# Patient Record
Sex: MALE | Race: WHITE | Employment: OTHER | URBAN - METROPOLITAN AREA
[De-identification: names, ages, dates, MRNs, and addresses within clinical notes are randomized per-mention and may not be internally consistent; named-entity substitution may affect disease eponyms.]

---

## 2017-07-24 ENCOUNTER — OFFICE VISIT (OUTPATIENT)
Dept: ORTHOPEDIC SURGERY | Facility: CLINIC | Age: 52
End: 2017-07-24

## 2017-07-24 VITALS
BODY MASS INDEX: 35.29 KG/M2 | RESPIRATION RATE: 15 BRPM | WEIGHT: 275 LBS | HEART RATE: 82 BPM | SYSTOLIC BLOOD PRESSURE: 119 MMHG | TEMPERATURE: 97.7 F | DIASTOLIC BLOOD PRESSURE: 81 MMHG | HEIGHT: 74 IN

## 2017-07-24 DIAGNOSIS — M25.512 CHRONIC LEFT SHOULDER PAIN: ICD-10-CM

## 2017-07-24 DIAGNOSIS — M79.672 LEFT FOOT PAIN: ICD-10-CM

## 2017-07-24 DIAGNOSIS — S93.602A FOOT SPRAIN, LEFT, INITIAL ENCOUNTER: Primary | ICD-10-CM

## 2017-07-24 DIAGNOSIS — G89.29 CHRONIC LEFT SHOULDER PAIN: ICD-10-CM

## 2017-07-24 DIAGNOSIS — M25.512 ACUTE PAIN OF LEFT SHOULDER: ICD-10-CM

## 2017-07-24 DIAGNOSIS — S43.52XA ACROMIOCLAVICULAR SPRAIN, LEFT, INITIAL ENCOUNTER: ICD-10-CM

## 2017-07-24 NOTE — PROGRESS NOTES
Patient: Hemal Rodriguez                MRN: [id number]       SSN: [id number]  YOB: 1965        AGE: 50 y.o. SEX: male    PCP: No primary care provider on file.  07/24/17    Chief Complaint   Patient presents with    Shoulder Injury     left     Ankle Injury     left     HISTORY:  Hemal Rodriguez is a 50 y.o. male who is seen for left shoulder and left foot pain. He states that he fell off of an 8 ft. ladder onto a concrete surface on 7/23/17. He was seen at [facility] on 7/24/17. He states severe pain with ROM of his left shoulder. Mr Artem Milan states that the doctor at the [facility] ED was concerned about a crack but x rays were not definitive. He notes foot pain and swelling following the injury. Pain Assessment  7/24/2017   Location of Pain Foot; Ankle   Location Modifiers Left   Severity of Pain 8   Quality of Pain Sharp   Duration of Pain A few hours   Frequency of Pain Several times daily   Aggravating Factors Bending;Stretching;Straightening;Exercise;Standing;Kneeling;Squatting; Walking;Stairs   Limiting Behavior Yes   Relieving Factors Rest   Result of Injury Yes   Work-Related Injury Yes   Type of Injury -     Occupation, etc:  Mr. 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 He lives in [place] with his wife and mother in law. He has two grown step children living with them as well--25and 23years old. Current weight is 275 pounds. He is 6'2\" tall. He is not hypertensive or diabetic. No results found for: HBA1C, HGBE8, LSP1CQME, IRR3FZOX, IRI3FWQV  Weight Metrics 7/24/2017   Weight 275 lb   BMI 35.31 kg/m2       There is no problem list on file for this patient. REVIEW OF SYSTEMS: All Below are Negative except: See HPI   Constitutional: negative for fever, chills, and weight loss.    Cardiovascular: negative for chest pain, claudication, leg swelling, SOB, LAM   Gastrointestinal: Negative for pain, N/V/C/D, Blood in stool or urine, dysuria,  hematuria, incontinence, pelvic pain. Musculoskeletal: See HPI   Neurological: Negative for dizziness and weakness. Negative for headaches, Visual changes, confusion, seizures   Phychiatric/Behavioral: Negative for depression, memory loss, substance  abuse. Extremities: Negative for hair changes, rash, or skin lesion changes. Hematologic: Negative for bleeding problems, bruising, pallor or swollen lymph  nodes   Peripheral Vascular: No calf pain, no circulation deficits. Social History     Social History    Marital status: SINGLE     Spouse name: N/A    Number of children: N/A    Years of education: N/A     Occupational History    Not on file. Social History Main Topics    Smoking status: Never Smoker    Smokeless tobacco: Never Used    Alcohol use Yes      Comment: occasionally    Drug use: No    Sexual activity: Not on file     Other Topics Concern    Not on file     Social History Narrative    No narrative on file      No Known Allergies   No current outpatient prescriptions on file. No current facility-administered medications for this visit.        PHYSICAL EXAMINATION:  Visit Vitals    /81    Pulse 82    Temp 97.7 °F (36.5 °C)    Resp 15    Ht 6' 2\" (1.88 m)    Wt 275 lb (124.7 kg)    BMI 35.31 kg/m2      ORTHO EXAMINATION:  Examination Right shoulder Left shoulder   Skin Intact Intact   Effusion - -   Biceps deformity - -   Atrophy - -   AC joint tenderness - +   Acromial tenderness + +, lateral   Biceps tenderness - -   Forward flexion/Elevation  30   Active abduction  30   External rotation ROM 30 0   Internal rotation ROM 70 70   Apprehension - -   Impingement - -   Drop Arm Test - -   Neurovascular Intact Intact   NO defect at the pectoralis insertion site  Pain with any motion left shoulder  Examination Right Ankle/Foot Left Ankle/Foot   Skin Intact Transverse abrasion of distal third medial tibia    Swelling - - Dorsiflexion 10 5   Plantarflexion 25 15   Deformity - -   Inversion laxity - -   Anterior drawer - -   Medial tenderness - +   Lateral tenderness - +   Heel cord Intact Intact   Sensation Intact Intact   Bunion - -   Toe nails Normal Normal   Capillary refill Normal Normal     RADIOGRAPHS:  XR LEFT SHOULDER 7/24/17  IMPRESSION:  Three views - No fractures, mild acromioclavicular narrowing, no glenohumeral narrowing, no calcific densities. No dislocation. XR LEFT FOOT 7/24/17  IMPRESSION:  Three views - No fractures, no bunion deformity, no heel spur. Soft tissue swelling. IMPRESSION:      ICD-10-CM ICD-9-CM    1. Foot sprain, left, initial encounter S93.602A 845.10 CAST BOOT OR SHOE   2. Chronic left shoulder pain M25.512 719.41 AMB POC XRAY, SHOULDER; COMPLETE, 2+    G89.29 338.29    3. Left foot pain M79.672 729.5 AMB POC XRAY, FOOT; COMPLETE, 3+ VIEW      CAST BOOT OR SHOE   4. Acromioclavicular sprain, left, initial encounter S43. 52XA 840.0 MRI SHOULDER LT W CONT      XR ARTHRO SHOULDER LT   5. Acute pain of left shoulder M25.512 719.41 MRI SHOULDER LT W CONT      XR ARTHRO SHOULDER LT     PLAN:  He will follow up in one week with the results of his left shoulder MRI Arthrogram study. A short fracture walker was applied today. We discussed a possible left foot MRI in the future if pain continues.      Scribed by Feli Wheeler ([address]) as dictated by Warden Duane, MD

## 2017-07-24 NOTE — MR AVS SNAPSHOT
Visit Information Date & Time Provider Department Dept. Phone Encounter #  
 7/24/2017  3:40 PM Ramos Wilkerson, [address] [facility] [place] [phone] [id number] Follow-up Instructions Return in about 1 week (around 7/31/2017). Upcoming Health Maintenance Date Due DTaP/Tdap/Td series (1 - Tdap) 1/8/1988 FOBT Q 1 YEAR AGE 50-75 1/8/2017 INFLUENZA AGE 9 TO ADULT 8/1/2017 Allergies as of 7/24/2017  Review Complete On: 7/24/2017 By: Ramos Wilkerson MD  
 No Known Allergies Current Immunizations  Never Reviewed No immunizations on file. Not reviewed this visit You Were Diagnosed With   
  
 Codes Comments Foot sprain, left, initial encounter    -  Primary ICD-10-CM: J43.004G ICD-9-CM: 845.10 Chronic left shoulder pain     ICD-10-CM: M25.512, G89.29 ICD-9-CM: 719.41, 338.29 Left foot pain     ICD-10-CM: Y64.126 ICD-9-CM: 729.5 Acromioclavicular sprain, left, initial encounter     ICD-10-CM: Z22.07RQ ICD-9-CM: 840.0 Acute pain of left shoulder     ICD-10-CM: M25.512 ICD-9-CM: 719.41 Vitals BP Pulse Temp Resp Height(growth percentile) Weight(growth percentile) 119/81 82 97.7 °F (36.5 °C) 15 6' 2\" (1.88 m) 275 lb (124.7 kg) BMI Smoking Status 35.31 kg/m2 Never Smoker Vitals History BMI and BSA Data Body Mass Index Body Surface Area  
 35.31 kg/m 2 2.55 m 2 Your Updated Medication List  
  
Notice  As of 7/24/2017  5:10 PM  
 You have not been prescribed any medications. We Performed the Following AMB POC XRAY, FOOT; COMPLETE, 3+ VIEW [00403 CPT(R)] AMB POC XRAY, SHOULDER; COMPLETE, 2+ [51970 CPT(R)] CAST BOOT OR SHOE [SUP32 Custom] Comments: LEFT LOW PROFILE BOOT Follow-up Instructions Return in about 1 week (around 7/31/2017). To-Do List   
 07/24/2017 Imaging:  MRI SHOULDER LT W CONT   
  
 07/24/2017 Imaging:  XR ARTHRO SHOULDER LT   
  
  
Patient Instructions MRI of the Shoulder: About This Test 
What is it? MRI (magnetic resonance imaging) is a test that uses a magnetic field and pulses of radio wave energy to make pictures of the organs and structures inside the body. An MRI can give your doctor information about your shoulder, the bones around it, and the tissues around it, such as cartilage, ligaments, and tendons. When you have an MRI, you lie on a table and the table moves into the MRI machine. Why is this test done? An MRI of the shoulder can find problems such as damage to the cartilage, tendons, and ligaments around the shoulder. It can also look for the cause of shoulder pain and find rotator cuff problems. How can you prepare for the test? 
Talk to your doctor about all your health conditions before the test. For example, tell your doctor if: 
· You are allergic to any medicines. · You are or might be pregnant. · You have a pacemaker, an artificial limb, any metal pins or metal parts in your body, metal heart valves, metal clips in your brain, metal implants in your ears, or any other implanted or prosthetic medical device. · You have an intrauterine device (IUD) in place. · You get nervous in confined spaces. You may need medicine to help you relax. · You wear a patch that contains medicine. · You have kidney disease. What happens before the test? 
· You will remove all metal objects, such as hearing aids, dentures, jewelry, watches, and hairpins. · You will take off all or most of your clothes and change into a gown. If you do leave some clothes on, make sure you take everything out of your pockets. · You may have contrast material (dye) put into your arm through a tube called an IV or directly into your shoulder joint. Contrast material helps doctors see specific organs, blood vessels, and most tumors.  
What happens during the test? 
 · You will lie on your back on a table that is part of the MRI scanner. Your head, chest, and arms may be held with straps to help you remain still. · The table will slide into the space that contains the magnet. A device called a coil may be placed over or wrapped around your shoulder. · Inside the scanner you will hear a fan and feel air moving. You may hear tapping, thumping, or snapping noises. You may be given earplugs or headphones to reduce the noise. · You will be asked to hold still during the scan. You may be asked to hold your breath for short periods. · You may be alone in the scanning room, but a technologist will be watching you through a window and talking with you during the test. 
What else should you know about the test? 
· An MRI does not hurt. · If a dye is used, you may feel a quick sting or pinch and some coolness when the IV is started. The dye may give you a metallic taste in your mouth. Some people feel sick to their stomach or get a headache. · If you breastfeed and are concerned about whether the dye used in this test is safe, talk to your doctor. Most experts believe that very little dye passes into breast milk and even less is passed on to the baby. But if you prefer, you can store some of your breast milk ahead of time and use it for a day or two after the test. 
· You may feel warmth in the area being examined. This is normal. 
How long does the test take? · The test usually takes 30 to 60 minutes but can take as long as 2 hours. What happens after the test? 
· You will probably be able to go home right away, depending on the reason for the test. 
· You can go back to your usual activities right away. Follow-up care is a key part of your treatment and safety. Be sure to make and go to all appointments, and call your doctor if you are having problems. It's also a good idea to keep a list of the medicines you take. Ask your doctor when you can expect to have your test results. Where can you learn more? Go to http://sharath-hank.info/. Enter Y462 in the search box to learn more about \"MRI of the Shoulder: About This Test.\" Current as of: October 14, 2016 Content Version: 11.3 © 2001-1248 [employer]. Care instructions adapted under license by [employer] (which disclaims liability or warranty for this information). If you have questions about a medical condition or this instruction, always ask your healthcare professional. [address] any warranty or liability for your use of this information. Foot Sprain: Care Instructions Your Care Instructions A foot sprain occurs when you stretch or tear the ligaments around your foot. Ligaments are the tough tissues that connect one bone to another. A sprain can happen when you run, fall, or hit your toe against something. Sprains often happen when you jump or change direction quickly. This may occur when you play basketball, soccer, or other sports. Most foot sprains will get better with treatment at home. Follow-up care is a key part of your treatment and safety. Be sure to make and go to all appointments, and call your doctor if you are having problems. It's also a good idea to know your test results and keep a list of the medicines you take. How can you care for yourself at home? · Walk or put weight on your sprained foot as long as it does not hurt. · If your doctor gave you a splint or immobilizer, wear it as directed. If you were given crutches, use them as directed. · For the first 2 days after your injury, avoid hot showers, hot tubs, or hot packs. They may increase swelling. · Put ice or a cold pack on your foot for 10 to 20 minutes at a time to stop swelling. Try this every 1 to 2 hours for 3 days (when you are awake) or until the swelling goes down. Put a thin cloth between the ice pack and your skin. Keep your splint dry. · After 2 or 3 days, if your swelling is gone, put a heating pad (set on low) or a warm cloth on your foot. Some doctors suggest that you go back and forth between hot and cold treatments. · Prop up your foot on a pillow when you ice it or anytime you sit or lie down. Try to keep it above the level of your heart. This will help reduce swelling. · Take pain medicines exactly as directed. ¨ If the doctor gave you a prescription medicine for pain, take it as prescribed. ¨ If you are not taking a prescription pain medicine, ask your doctor if you can take an over-the-counter medicine. · Do any exercises that your doctor or physical therapist suggests. · Return to your usual exercise gradually as you feel better. When should you call for help? Call your doctor now or seek immediate medical care if: 
· You have increased or severe pain. · Your toes are cool or pale or change color. · Your wrap or splint feels too tight. · You have signs of a blood clot, such as: 
¨ Pain in your calf, back of the knee, thigh, or groin. ¨ Redness and swelling in your leg or groin. · You have tingling, weakness, or numbness in your leg or foot. Watch closely for changes in your health, and be sure to contact your doctor if: 
· You cannot put any weight on your foot. · You get a fever. · You do not get better as expected. Where can you learn more? Go to http://sharath-hank.info/. Enter H941 in the search box to learn more about \"Foot Sprain: Care Instructions. \" Current as of: March 21, 2017 Content Version: 11.3 © 2036-1835 [employer] Care instructions adapted under license by [employer] (which disclaims liability or warranty for this information). If you have questions about a medical condition or this instruction, always ask your healthcare professional. [address] any warranty or liability for your use of this information. Introducing [facility]! Zeny Del Valle introduces "[employer]" patient portal. Now you can access parts of your medical record, email your doctor's office, and request medication refills online. 1. In your internet browser, go to [email or web address] 2. Click on the First Time User? Click Here link in the Sign In box. You will see the New Member Sign Up page. 3. Enter your "[employer]" Access Code exactly as it appears below. You will not need to use this code after youve completed the sign-up process. If you do not sign up before the expiration date, you must request a new code. · "[employer]" Access Code: [email or web address] Expires: 10/22/2017  3:55 PM 
 
4. Enter the last four digits of your Social Security Number (xxxx) and Date of Birth (mm/dd/yyyy) as indicated and click Submit. You will be taken to the next sign-up page. 5. Create a "[employer]" ID. This will be your "[employer]" login ID and cannot be changed, so think of one that is secure and easy to remember. 6. Create a "[employer]" password. You can change your password at any time. 7. Enter your Password Reset Question and Answer. This can be used at a later time if you forget your password. 8. Enter your e-mail address. You will receive e-mail notification when new information is available in [address] 9. Click Sign Up. You can now view and download portions of your medical record. 10. Click the Download Summary menu link to download a portable copy of your medical information. If you have questions, please visit the Frequently Asked Questions section of the "[employer]" website. Remember, "[employer]" is NOT to be used for urgent needs. For medical emergencies, dial 911. Now available from your iPhone and Android! Please provide this summary of care documentation to your next provider. If you have any questions after today's visit, please call [phone].

## 2017-07-24 NOTE — PATIENT INSTRUCTIONS
MRI of the Shoulder: About This Test  What is it? MRI (magnetic resonance imaging) is a test that uses a magnetic field and pulses of radio wave energy to make pictures of the organs and structures inside the body. An MRI can give your doctor information about your shoulder, the bones around it, and the tissues around it, such as cartilage, ligaments, and tendons. When you have an MRI, you lie on a table and the table moves into the MRI machine. Why is this test done? An MRI of the shoulder can find problems such as damage to the cartilage, tendons, and ligaments around the shoulder. It can also look for the cause of shoulder pain and find rotator cuff problems. How can you prepare for the test?  Talk to your doctor about all your health conditions before the test. For example, tell your doctor if:  · You are allergic to any medicines. · You are or might be pregnant. · You have a pacemaker, an artificial limb, any metal pins or metal parts in your body, metal heart valves, metal clips in your brain, metal implants in your ears, or any other implanted or prosthetic medical device. · You have an intrauterine device (IUD) in place. · You get nervous in confined spaces. You may need medicine to help you relax. · You wear a patch that contains medicine. · You have kidney disease. What happens before the test?  · You will remove all metal objects, such as hearing aids, dentures, jewelry, watches, and hairpins. · You will take off all or most of your clothes and change into a gown. If you do leave some clothes on, make sure you take everything out of your pockets. · You may have contrast material (dye) put into your arm through a tube called an IV or directly into your shoulder joint. Contrast material helps doctors see specific organs, blood vessels, and most tumors. What happens during the test?  · You will lie on your back on a table that is part of the MRI scanner.  Your head, chest, and arms may be held with straps to help you remain still. · The table will slide into the space that contains the magnet. A device called a coil may be placed over or wrapped around your shoulder. · Inside the scanner you will hear a fan and feel air moving. You may hear tapping, thumping, or snapping noises. You may be given earplugs or headphones to reduce the noise. · You will be asked to hold still during the scan. You may be asked to hold your breath for short periods. · You may be alone in the scanning room, but a technologist will be watching you through a window and talking with you during the test.  What else should you know about the test?  · An MRI does not hurt. · If a dye is used, you may feel a quick sting or pinch and some coolness when the IV is started. The dye may give you a metallic taste in your mouth. Some people feel sick to their stomach or get a headache. · If you breastfeed and are concerned about whether the dye used in this test is safe, talk to your doctor. Most experts believe that very little dye passes into breast milk and even less is passed on to the baby. But if you prefer, you can store some of your breast milk ahead of time and use it for a day or two after the test.  · You may feel warmth in the area being examined. This is normal.  How long does the test take? · The test usually takes 30 to 60 minutes but can take as long as 2 hours. What happens after the test?  · You will probably be able to go home right away, depending on the reason for the test.  · You can go back to your usual activities right away. Follow-up care is a key part of your treatment and safety. Be sure to make and go to all appointments, and call your doctor if you are having problems. It's also a good idea to keep a list of the medicines you take. Ask your doctor when you can expect to have your test results. Where can you learn more? Go to http://sharath-hank.info/.   Enter K278 in the search box to learn more about \"MRI of the Shoulder: About This Test.\"  Current as of: October 14, 2016  Content Version: 11.3  © 5513-0056 [employer]. Care instructions adapted under license by [employer] (which disclaims liability or warranty for this information). If you have questions about a medical condition or this instruction, always ask your healthcare professional. [address] any warranty or liability for your use of this information. Foot Sprain: Care Instructions  Your Care Instructions    A foot sprain occurs when you stretch or tear the ligaments around your foot. Ligaments are the tough tissues that connect one bone to another. A sprain can happen when you run, fall, or hit your toe against something. Sprains often happen when you jump or change direction quickly. This may occur when you play basketball, soccer, or other sports. Most foot sprains will get better with treatment at home. Follow-up care is a key part of your treatment and safety. Be sure to make and go to all appointments, and call your doctor if you are having problems. It's also a good idea to know your test results and keep a list of the medicines you take. How can you care for yourself at home? · Walk or put weight on your sprained foot as long as it does not hurt. · If your doctor gave you a splint or immobilizer, wear it as directed. If you were given crutches, use them as directed. · For the first 2 days after your injury, avoid hot showers, hot tubs, or hot packs. They may increase swelling. · Put ice or a cold pack on your foot for 10 to 20 minutes at a time to stop swelling. Try this every 1 to 2 hours for 3 days (when you are awake) or until the swelling goes down. Put a thin cloth between the ice pack and your skin. Keep your splint dry. · After 2 or 3 days, if your swelling is gone, put a heating pad (set on low) or a warm cloth on your foot.  Some doctors suggest that you go back and forth between hot and cold treatments. · Prop up your foot on a pillow when you ice it or anytime you sit or lie down. Try to keep it above the level of your heart. This will help reduce swelling. · Take pain medicines exactly as directed. ¨ If the doctor gave you a prescription medicine for pain, take it as prescribed. ¨ If you are not taking a prescription pain medicine, ask your doctor if you can take an over-the-counter medicine. · Do any exercises that your doctor or physical therapist suggests. · Return to your usual exercise gradually as you feel better. When should you call for help? Call your doctor now or seek immediate medical care if:  · You have increased or severe pain. · Your toes are cool or pale or change color. · Your wrap or splint feels too tight. · You have signs of a blood clot, such as:  ¨ Pain in your calf, back of the knee, thigh, or groin. ¨ Redness and swelling in your leg or groin. · You have tingling, weakness, or numbness in your leg or foot. Watch closely for changes in your health, and be sure to contact your doctor if:  · You cannot put any weight on your foot. · You get a fever. · You do not get better as expected. Where can you learn more? Go to http://sharath-hank.info/. Enter J683 in the search box to learn more about \"Foot Sprain: Care Instructions. \"  Current as of: March 21, 2017  Content Version: 11.3  © 4121-1264 [employer]. Care instructions adapted under license by [employer] (which disclaims liability or warranty for this information). If you have questions about a medical condition or this instruction, always ask your healthcare professional. [address] any warranty or liability for your use of this information.

## 2017-07-27 ENCOUNTER — DOCUMENTATION ONLY (OUTPATIENT)
Dept: ORTHOPEDIC SURGERY | Facility: CLINIC | Age: 52
End: 2017-07-27

## 2017-07-27 ENCOUNTER — TELEPHONE (OUTPATIENT)
Dept: ORTHOPEDIC SURGERY | Age: 52
End: 2017-07-27

## 2017-07-27 DIAGNOSIS — S93.602A FOOT SPRAIN, LEFT, INITIAL ENCOUNTER: ICD-10-CM

## 2017-07-27 DIAGNOSIS — M79.672 LEFT FOOT PAIN: Primary | ICD-10-CM

## 2017-07-27 NOTE — PROGRESS NOTES
MR-ARTHROGRAM OF LEFT SHOULDER IS SCHEDULED AT [facility] ON 8/3/17 AT 1245. PATIENT IS WARE AND FOLLOW UP WITH DR. Josey Berger IS SET FOR 8/4/17.

## 2017-07-27 NOTE — TELEPHONE ENCOUNTER
Katya Wolff (ok per HIPAA) called to report that patient is experiencing increased pain in left calf/knee. The left foot was discussed at last office visit and the possibility of ordering an MRI for the foot, however, patient is now experiencing pain in calf & knee. MRI for shoulder is scheduled for 8/3, should patient be seen again for calf/knee pain or wait until upcoming appointment?   Please advise patient or Lion Orellana at [phone]

## 2017-07-27 NOTE — TELEPHONE ENCOUNTER
Order placed for MRI of left foot. Pt was contacted and informed of order being placed and if there was any increase in swelling, warmth or redness pt should go to the ER.

## 2017-07-28 ENCOUNTER — OFFICE VISIT (OUTPATIENT)
Dept: ORTHOPEDIC SURGERY | Facility: CLINIC | Age: 52
End: 2017-07-28

## 2017-07-28 ENCOUNTER — TELEPHONE (OUTPATIENT)
Dept: ORTHOPEDIC SURGERY | Facility: CLINIC | Age: 52
End: 2017-07-28

## 2017-07-28 VITALS
HEART RATE: 72 BPM | WEIGHT: 275 LBS | OXYGEN SATURATION: 97 % | DIASTOLIC BLOOD PRESSURE: 93 MMHG | RESPIRATION RATE: 18 BRPM | BODY MASS INDEX: 35.29 KG/M2 | SYSTOLIC BLOOD PRESSURE: 140 MMHG | HEIGHT: 74 IN | TEMPERATURE: 96.6 F

## 2017-07-28 DIAGNOSIS — S86.812A STRAIN OF CALF MUSCLE, LEFT, INITIAL ENCOUNTER: ICD-10-CM

## 2017-07-28 DIAGNOSIS — M79.672 LEFT FOOT PAIN: ICD-10-CM

## 2017-07-28 DIAGNOSIS — S93.412A SPRAIN OF CALCANEOFIBULAR LIGAMENT OF LEFT ANKLE, INITIAL ENCOUNTER: ICD-10-CM

## 2017-07-28 DIAGNOSIS — M25.512 ACUTE PAIN OF LEFT SHOULDER: Primary | ICD-10-CM

## 2017-07-28 DIAGNOSIS — S43.52XA ACROMIOCLAVICULAR SPRAIN, LEFT, INITIAL ENCOUNTER: ICD-10-CM

## 2017-07-28 DIAGNOSIS — S93.602A FOOT SPRAIN, LEFT, INITIAL ENCOUNTER: ICD-10-CM

## 2017-07-28 NOTE — PROGRESS NOTES
Patient: Kristen Pitt                MRN: [id number]       SSN: [id number]  YOB: 1965        AGE: 50 y.o. SEX: male    PCP: No primary care provider on file.  07/28/17    Chief Complaint   Patient presents with    Leg Pain     Left     HISTORY:  Kristen Pitt is a 50 y.o. male who is seen for left shoulder and left foot pain. He states that he fell off of an 8 ft. ladder onto a concrete surface on 7/23/17. He was seen at [facility] on 7/24/17. He states severe pain with ROM of his left shoulder. Mr Lm Moe states that the doctor at the [facility] ED was concerned about a crack but x rays were not definitive. He notes foot pain and swelling following the injury. He notes pain over the upper left lateral gastrocnemius area. 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 He lives in [place] with his wife and mother in law. He has two grown step children living with them as well--25and 23years old. Current weight is 275 pounds. He is 6'2\" tall. He is not hypertensive or diabetic. No results found for: HBA1C, HGBE8, NMG8IEMS, PWU4GLFQ, BWU8DHMG  Weight Metrics 7/28/2017 7/24/2017   Weight 275 lb 275 lb   BMI 35.31 kg/m2 35.31 kg/m2       There is no problem list on file for this patient. REVIEW OF SYSTEMS: All Below are Negative except: See HPI   Constitutional: negative for fever, chills, and weight loss.    Cardiovascular: negative for chest pain, claudication, leg swelling, SOB, LAM   Gastrointestinal: Negative for pain, N/V/C/D, Blood in stool or urine, dysuria,  hematuria, incontinence, pelvic pain. Musculoskeletal: See HPI   Neurological: Negative for dizziness and weakness. Negative for headaches, Visual changes, confusion, seizures   Phychiatric/Behavioral: Negative for depression, memory loss, substance  abuse. Extremities: Negative for hair changes, rash, or skin lesion changes. Hematologic: Negative for bleeding problems, bruising, pallor or swollen lymph  nodes   Peripheral Vascular: No calf pain, no circulation deficits. Social History     Social History    Marital status: SINGLE     Spouse name: N/A    Number of children: N/A    Years of education: N/A     Occupational History    Not on file. Social History Main Topics    Smoking status: Never Smoker    Smokeless tobacco: Never Used    Alcohol use Yes      Comment: occasionally    Drug use: No    Sexual activity: Not on file     Other Topics Concern    Not on file     Social History Narrative      No Known Allergies   No current outpatient prescriptions on file. No current facility-administered medications for this visit.        PHYSICAL EXAMINATION:  Visit Vitals    BP (!) 140/93    Pulse 72    Temp 96.6 °F (35.9 °C) (Oral)    Resp 18    Ht 6' 2\" (1.88 m)    Wt 275 lb (124.7 kg)    SpO2 97%    BMI 35.31 kg/m2      ORTHO EXAMINATION:  Examination Right shoulder Left shoulder   Skin Intact Intact   Effusion - -   Biceps deformity - -   Atrophy - -   AC joint tenderness - +   Acromial tenderness + +, lateral   Biceps tenderness - -   Forward flexion/Elevation  30   Active abduction  30   External rotation ROM 30 0   Internal rotation ROM 70 70   Apprehension - -   Impingement - -   Drop Arm Test - -   Neurovascular Intact Intact   No defect over the pectoralis major tendon  Pain with any motion left shoulder  Examination Right knee Left knee   Skin Intact Intact   Range of motion 120-0 120-0   Effusion - -   Medial joint line tenderness + +   Lateral joint line tenderness - -   Popliteal tenderness - -   Osteophytes palpable - -   Alicias - -   Patella crepitus - -   Anterior drawer - -   Lateral laxity - -   Medial laxity - -   Varus deformity - -   Valgus deformity - -   Pretibial edema - 2-3+   Calf tenderness - -   Chuck sign negative    Examination Right Ankle/Foot Left Ankle/Foot   Skin Intact Transverse abrasion of distal third medial tibia    Swelling - -   Dorsiflexion 10 5   Plantarflexion 25 15   Deformity - -   Inversion laxity - -   Anterior drawer - -   Medial tenderness - +   Lateral tenderness - +   Heel cord Intact Intact   Sensation Intact Intact   Bunion - -   Toe nails Normal Normal   Capillary refill Normal Normal   Tenderness over calcaneofibular ligament of left ankle     RADIOGRAPHS:  XR LEFT SHOULDER 7/24/17  IMPRESSION:  Three views - No fractures, mild acromioclavicular narrowing, no glenohumeral narrowing, no calcific densities. No dislocation. XR LEFT FOOT 7/24/17  IMPRESSION:  Three views - No fractures, no bunion deformity, no heel spur. Soft tissue swelling. IMPRESSION:      ICD-10-CM ICD-9-CM    1. Acute pain of left shoulder M25.512 719.41    2. Foot sprain, left, initial encounter P16.600E 845.10    3. Acromioclavicular sprain, left, initial encounter S43. 52XA 840.0    4. Left foot pain M79.672 729.5      PLAN:  He will follow up in one week with the results of his left shoulder MRI Arthrogram study. We discussed a possible left foot/ankle MRI in the future if pain continues. He will continue to use a short fracture walker for his ankle sprain.      Scribed by Barrie Brady ([facility]) as dictated by Antonia Gutierrez MD

## 2017-07-28 NOTE — MR AVS SNAPSHOT
Visit Information Date & Time Provider Department Dept. Phone Encounter #  
 7/28/2017  1:00 PM Amadeo Stark MD [facility] [phone] [id number] Follow-up Instructions Return if symptoms worsen or fail to improve. Your Appointments 8/4/2017  3:40 PM  
DIAG TEST F/U with Amadeo Stark MD  
[address] [facility], [address] [facility] [address] [facility]) Appt Note: f/u lt shoulder MRI  
 [address] [facility], [address] [facility] [address] [phone]  
  
   
 [address] [facility] [address], [address] [facility] [address] Upcoming Health Maintenance Date Due DTaP/Tdap/Td series (1 - Tdap) 1/8/1988 FOBT Q 1 YEAR AGE 50-75 1/8/2017 INFLUENZA AGE 9 TO ADULT 8/1/2017 Allergies as of 7/28/2017  Review Complete On: 7/24/2017 By: Amadeo Stark MD  
 No Known Allergies Current Immunizations  Never Reviewed No immunizations on file. Not reviewed this visit You Were Diagnosed With   
  
 Codes Comments Acute pain of left shoulder    -  Primary ICD-10-CM: M25.512 ICD-9-CM: 719.41 Foot sprain, left, initial encounter     ICD-10-CM: H93.006V ICD-9-CM: 845.10 Acromioclavicular sprain, left, initial encounter     ICD-10-CM: N01.71DW ICD-9-CM: 840.0 Left foot pain     ICD-10-CM: K35.798 ICD-9-CM: 729.5 Strain of calf muscle, left, initial encounter     ICD-10-CM: D79.617A ICD-9-CM: 844.8 Sprain of calcaneofibular ligament of left ankle, initial encounter     ICD-10-CM: J61.629O ICD-9-CM: 845.02 Vitals BP Pulse Temp Resp Height(growth percentile) Weight(growth percentile) (!) 140/93 72 96.6 °F (35.9 °C) (Oral) 18 6' 2\" (1.88 m) 275 lb (124.7 kg) SpO2 BMI Smoking Status 97% 35.31 kg/m2 Never Smoker BMI and BSA Data Body Mass Index Body Surface Area  
 35.31 kg/m 2 2.55 m 2 Your Updated Medication List  
  
 Notice  As of 7/28/2017  1:37 PM  
 You have not been prescribed any medications. Follow-up Instructions Return if symptoms worsen or fail to improve. Patient Instructions Foot Sprain: Care Instructions Your Care Instructions A foot sprain occurs when you stretch or tear the ligaments around your foot. Ligaments are the tough tissues that connect one bone to another. A sprain can happen when you run, fall, or hit your toe against something. Sprains often happen when you jump or change direction quickly. This may occur when you play basketball, soccer, or other sports. Most foot sprains will get better with treatment at home. Follow-up care is a key part of your treatment and safety. Be sure to make and go to all appointments, and call your doctor if you are having problems. It's also a good idea to know your test results and keep a list of the medicines you take. How can you care for yourself at home? · Walk or put weight on your sprained foot as long as it does not hurt. · If your doctor gave you a splint or immobilizer, wear it as directed. If you were given crutches, use them as directed. · For the first 2 days after your injury, avoid hot showers, hot tubs, or hot packs. They may increase swelling. · Put ice or a cold pack on your foot for 10 to 20 minutes at a time to stop swelling. Try this every 1 to 2 hours for 3 days (when you are awake) or until the swelling goes down. Put a thin cloth between the ice pack and your skin. Keep your splint dry. · After 2 or 3 days, if your swelling is gone, put a heating pad (set on low) or a warm cloth on your foot. Some doctors suggest that you go back and forth between hot and cold treatments. · Prop up your foot on a pillow when you ice it or anytime you sit or lie down. Try to keep it above the level of your heart. This will help reduce swelling. · Take pain medicines exactly as directed. ¨ If the doctor gave you a prescription medicine for pain, take it as prescribed. ¨ If you are not taking a prescription pain medicine, ask your doctor if you can take an over-the-counter medicine. · Do any exercises that your doctor or physical therapist suggests. · Return to your usual exercise gradually as you feel better. When should you call for help? Call your doctor now or seek immediate medical care if: 
· You have increased or severe pain. · Your toes are cool or pale or change color. · Your wrap or splint feels too tight. · You have signs of a blood clot, such as: 
¨ Pain in your calf, back of the knee, thigh, or groin. ¨ Redness and swelling in your leg or groin. · You have tingling, weakness, or numbness in your leg or foot. Watch closely for changes in your health, and be sure to contact your doctor if: 
· You cannot put any weight on your foot. · You get a fever. · You do not get better as expected. Where can you learn more? Go to http://sharath-hank.info/. Enter Y002 in the search box to learn more about \"Foot Sprain: Care Instructions. \" Current as of: March 21, 2017 Content Version: 11.3 © 1528-3359 [employer]. Care instructions adapted under license by [employer] (which disclaims liability or warranty for this information). If you have questions about a medical condition or this instruction, always ask your healthcare professional. [place] [address] any warranty or liability for your use of this information. Shoulder Sprain: Care Instructions Your Care Instructions A shoulder sprain occurs when you stretch or tear a ligament in your shoulder. Ligaments are tough tissues that connect one bone to another. A sprain can happen during sports, a fall, or projects around the house. Shoulder sprains usually get better with treatment at home. Follow-up care is a key part of your treatment and safety.  Be sure to make and go to all appointments, and call your doctor if you are having problems. It's also a good idea to know your test results and keep a list of the medicines you take. How can you care for yourself at home? · Rest and protect your shoulder. Try to stop or reduce any action that causes pain. · If your doctor gave you a sling or immobilizer, wear it as directed. A sling or immobilizer supports your shoulder and may make you more comfortable. · Put ice or a cold pack on your shoulder for 10 to 20 minutes at a time. Try to do this every 1 to 2 hours for the next 3 days (when you are awake) or until the swelling goes down. Put a thin cloth between the ice and your skin. Some doctors suggest alternating between hot and cold. · Be safe with medicines. Read and follow all instructions on the label. ¨ If the doctor gave you a prescription medicine for pain, take it as prescribed. ¨ If you are not taking a prescription pain medicine, ask your doctor if you can take an over-the-counter medicine. · For the first day or two after an injury, avoid things that might increase swelling, such as hot showers, hot tubs, or hot packs. · After 2 or 3 days, if your swelling is gone, apply a heating pad set on low or a warm cloth to your shoulder. This helps keep your shoulder flexible. Some doctors suggest that you go back and forth between hot and cold. Put a thin cloth between the heating pad and your skin. · Follow your doctor's or physical therapist's directions for exercises. · Return to your usual level of activity slowly. When should you call for help? Call your doctor now or seek immediate medical care if: 
· Your pain is worse. · You cannot move your shoulder. · Your arm is cool or pale or changes color below the shoulder. · You have tingling, weakness, or numbness in your arm. Watch closely for changes in your health, and be sure to contact your doctor if: 
· You do not get better as expected. Where can you learn more? Go to http://sharath-hank.info/. Enter T696 in the search box to learn more about \"Shoulder Sprain: Care Instructions. \" Current as of: March 21, 2017 Content Version: 11.3 © 6676-6498 [employer]. Care instructions adapted under license by [employer] (which disclaims liability or warranty for this information). If you have questions about a medical condition or this instruction, always ask your healthcare professional. [address] any warranty or liability for your use of this information. MRI of the Shoulder: About This Test 
What is it? MRI (magnetic resonance imaging) is a test that uses a magnetic field and pulses of radio wave energy to make pictures of the organs and structures inside the body. An MRI can give your doctor information about your shoulder, the bones around it, and the tissues around it, such as cartilage, ligaments, and tendons. When you have an MRI, you lie on a table and the table moves into the MRI machine. Why is this test done? An MRI of the shoulder can find problems such as damage to the cartilage, tendons, and ligaments around the shoulder. It can also look for the cause of shoulder pain and find rotator cuff problems. How can you prepare for the test? 
Talk to your doctor about all your health conditions before the test. For example, tell your doctor if: 
· You are allergic to any medicines. · You are or might be pregnant. · You have a pacemaker, an artificial limb, any metal pins or metal parts in your body, metal heart valves, metal clips in your brain, metal implants in your ears, or any other implanted or prosthetic medical device. · You have an intrauterine device (IUD) in place. · You get nervous in confined spaces. You may need medicine to help you relax. · You wear a patch that contains medicine. · You have kidney disease.  
What happens before the test? 
 · You will remove all metal objects, such as hearing aids, dentures, jewelry, watches, and hairpins. · You will take off all or most of your clothes and change into a gown. If you do leave some clothes on, make sure you take everything out of your pockets. · You may have contrast material (dye) put into your arm through a tube called an IV or directly into your shoulder joint. Contrast material helps doctors see specific organs, blood vessels, and most tumors. What happens during the test? 
· You will lie on your back on a table that is part of the MRI scanner. Your head, chest, and arms may be held with straps to help you remain still. · The table will slide into the space that contains the magnet. A device called a coil may be placed over or wrapped around your shoulder. · Inside the scanner you will hear a fan and feel air moving. You may hear tapping, thumping, or snapping noises. You may be given earplugs or headphones to reduce the noise. · You will be asked to hold still during the scan. You may be asked to hold your breath for short periods. · You may be alone in the scanning room, but a technologist will be watching you through a window and talking with you during the test. 
What else should you know about the test? 
· An MRI does not hurt. · If a dye is used, you may feel a quick sting or pinch and some coolness when the IV is started. The dye may give you a metallic taste in your mouth. Some people feel sick to their stomach or get a headache. · If you breastfeed and are concerned about whether the dye used in this test is safe, talk to your doctor. Most experts believe that very little dye passes into breast milk and even less is passed on to the baby. But if you prefer, you can store some of your breast milk ahead of time and use it for a day or two after the test. 
· You may feel warmth in the area being examined. This is normal. 
How long does the test take? · The test usually takes 30 to 60 minutes but can take as long as 2 hours. What happens after the test? 
· You will probably be able to go home right away, depending on the reason for the test. 
· You can go back to your usual activities right away. Follow-up care is a key part of your treatment and safety. Be sure to make and go to all appointments, and call your doctor if you are having problems. It's also a good idea to keep a list of the medicines you take. Ask your doctor when you can expect to have your test results. Where can you learn more? Go to http://sharath-hank.info/. Enter F007 in the search box to learn more about \"MRI of the Shoulder: About This Test.\" Current as of: October 14, 2016 Content Version: 11.3 © 4081-3878 [employer]. Care instructions adapted under license by [employer] (which disclaims liability or warranty for this information). If you have questions about a medical condition or this instruction, always ask your healthcare professional. [address] any warranty or liability for your use of this information. Introducing [facility]! Brianne Lance introduces Ipropertyz patient portal. Now you can access parts of your medical record, email your doctor's office, and request medication refills online. 1. In your internet browser, go to [email or web address] 2. Click on the First Time User? Click Here link in the Sign In box. You will see the New Member Sign Up page. 3. Enter your Ipropertyz Access Code exactly as it appears below. You will not need to use this code after youve completed the sign-up process. If you do not sign up before the expiration date, you must request a new code. · Iproperty[employer] Access Code: [email or web address] Expires: 10/22/2017  3:55 PM 
 
4.  Enter the last four digits of your Social Security Number (xxxx) and Date of Birth (mm/dd/yyyy) as indicated and click Submit. You will be taken to the next sign-up page. 5. Create a CompassMed ID. This will be your [employer] login ID and cannot be changed, so think of one that is secure and easy to remember. 6. Create a [employer]ass[employer] password. You can change your password at any time. 7. Enter your Password Reset Question and Answer. This can be used at a later time if you forget your password. 8. Enter your e-mail address. You will receive e-mail notification when new information is available in [address] 9. Click Sign Up. You can now view and download portions of your medical record. 10. Click the Download Summary menu link to download a portable copy of your medical information. If you have questions, please visit the Frequently Asked Questions section of the Compass[employer] website. Remember, Comp[employer]Med is NOT to be used for urgent needs. For medical emergencies, dial 911. Now available from your iPhone and Android! Please provide this summary of care documentation to your next provider. If you have any questions after today's visit, please call [phone].

## 2017-07-28 NOTE — TELEPHONE ENCOUNTER
[facility] called requesting another fax for an MRI order for the patient. They received the fax from yesterday but they say it is hard to read and they need clarification. Call back # [phone]. Fax # [phone].

## 2017-07-28 NOTE — PATIENT INSTRUCTIONS
Foot Sprain: Care Instructions  Your Care Instructions    A foot sprain occurs when you stretch or tear the ligaments around your foot. Ligaments are the tough tissues that connect one bone to another. A sprain can happen when you run, fall, or hit your toe against something. Sprains often happen when you jump or change direction quickly. This may occur when you play basketball, soccer, or other sports. Most foot sprains will get better with treatment at home. Follow-up care is a key part of your treatment and safety. Be sure to make and go to all appointments, and call your doctor if you are having problems. It's also a good idea to know your test results and keep a list of the medicines you take. How can you care for yourself at home? · Walk or put weight on your sprained foot as long as it does not hurt. · If your doctor gave you a splint or immobilizer, wear it as directed. If you were given crutches, use them as directed. · For the first 2 days after your injury, avoid hot showers, hot tubs, or hot packs. They may increase swelling. · Put ice or a cold pack on your foot for 10 to 20 minutes at a time to stop swelling. Try this every 1 to 2 hours for 3 days (when you are awake) or until the swelling goes down. Put a thin cloth between the ice pack and your skin. Keep your splint dry. · After 2 or 3 days, if your swelling is gone, put a heating pad (set on low) or a warm cloth on your foot. Some doctors suggest that you go back and forth between hot and cold treatments. · Prop up your foot on a pillow when you ice it or anytime you sit or lie down. Try to keep it above the level of your heart. This will help reduce swelling. · Take pain medicines exactly as directed. ¨ If the doctor gave you a prescription medicine for pain, take it as prescribed. ¨ If you are not taking a prescription pain medicine, ask your doctor if you can take an over-the-counter medicine.   · Do any exercises that your doctor or physical therapist suggests. · Return to your usual exercise gradually as you feel better. When should you call for help? Call your doctor now or seek immediate medical care if:  · You have increased or severe pain. · Your toes are cool or pale or change color. · Your wrap or splint feels too tight. · You have signs of a blood clot, such as:  ¨ Pain in your calf, back of the knee, thigh, or groin. ¨ Redness and swelling in your leg or groin. · You have tingling, weakness, or numbness in your leg or foot. Watch closely for changes in your health, and be sure to contact your doctor if:  · You cannot put any weight on your foot. · You get a fever. · You do not get better as expected. Where can you learn more? Go to http://sharath-hank.info/. Enter D453 in the search box to learn more about \"Foot Sprain: Care Instructions. \"  Current as of: March 21, 2017  Content Version: 11.3  © 6764-6781 [employer]. Care instructions adapted under license by [employer] (which disclaims liability or warranty for this information). If you have questions about a medical condition or this instruction, always ask your healthcare professional. [address] any warranty or liability for your use of this information. Shoulder Sprain: Care Instructions  Your Care Instructions    A shoulder sprain occurs when you stretch or tear a ligament in your shoulder. Ligaments are tough tissues that connect one bone to another. A sprain can happen during sports, a fall, or projects around the house. Shoulder sprains usually get better with treatment at home. Follow-up care is a key part of your treatment and safety. Be sure to make and go to all appointments, and call your doctor if you are having problems. It's also a good idea to know your test results and keep a list of the medicines you take. How can you care for yourself at home?   · Rest and protect your shoulder. Try to stop or reduce any action that causes pain. · If your doctor gave you a sling or immobilizer, wear it as directed. A sling or immobilizer supports your shoulder and may make you more comfortable. · Put ice or a cold pack on your shoulder for 10 to 20 minutes at a time. Try to do this every 1 to 2 hours for the next 3 days (when you are awake) or until the swelling goes down. Put a thin cloth between the ice and your skin. Some doctors suggest alternating between hot and cold. · Be safe with medicines. Read and follow all instructions on the label. ¨ If the doctor gave you a prescription medicine for pain, take it as prescribed. ¨ If you are not taking a prescription pain medicine, ask your doctor if you can take an over-the-counter medicine. · For the first day or two after an injury, avoid things that might increase swelling, such as hot showers, hot tubs, or hot packs. · After 2 or 3 days, if your swelling is gone, apply a heating pad set on low or a warm cloth to your shoulder. This helps keep your shoulder flexible. Some doctors suggest that you go back and forth between hot and cold. Put a thin cloth between the heating pad and your skin. · Follow your doctor's or physical therapist's directions for exercises. · Return to your usual level of activity slowly. When should you call for help? Call your doctor now or seek immediate medical care if:  · Your pain is worse. · You cannot move your shoulder. · Your arm is cool or pale or changes color below the shoulder. · You have tingling, weakness, or numbness in your arm. Watch closely for changes in your health, and be sure to contact your doctor if:  · You do not get better as expected. Where can you learn more? Go to http://sharath-hank.info/. Enter G848 in the search box to learn more about \"Shoulder Sprain: Care Instructions. \"  Current as of: March 21, 2017  Content Version: 11.3  © 6176-3866 Healthwise[employer] Incorporated. Care instructions adapted under license by [employer] (which disclaims liability or warranty for this information). If you have questions about a medical condition or this instruction, always ask your healthcare professional. [address] any warranty or liability for your use of this information. MRI of the Shoulder: About This Test  What is it? MRI (magnetic resonance imaging) is a test that uses a magnetic field and pulses of radio wave energy to make pictures of the organs and structures inside the body. An MRI can give your doctor information about your shoulder, the bones around it, and the tissues around it, such as cartilage, ligaments, and tendons. When you have an MRI, you lie on a table and the table moves into the MRI machine. Why is this test done? An MRI of the shoulder can find problems such as damage to the cartilage, tendons, and ligaments around the shoulder. It can also look for the cause of shoulder pain and find rotator cuff problems. How can you prepare for the test?  Talk to your doctor about all your health conditions before the test. For example, tell your doctor if:  · You are allergic to any medicines. · You are or might be pregnant. · You have a pacemaker, an artificial limb, any metal pins or metal parts in your body, metal heart valves, metal clips in your brain, metal implants in your ears, or any other implanted or prosthetic medical device. · You have an intrauterine device (IUD) in place. · You get nervous in confined spaces. You may need medicine to help you relax. · You wear a patch that contains medicine. · You have kidney disease. What happens before the test?  · You will remove all metal objects, such as hearing aids, dentures, jewelry, watches, and hairpins. · You will take off all or most of your clothes and change into a gown.  If you do leave some clothes on, make sure you take everything out of your pockets. · You may have contrast material (dye) put into your arm through a tube called an IV or directly into your shoulder joint. Contrast material helps doctors see specific organs, blood vessels, and most tumors. What happens during the test?  · You will lie on your back on a table that is part of the MRI scanner. Your head, chest, and arms may be held with straps to help you remain still. · The table will slide into the space that contains the magnet. A device called a coil may be placed over or wrapped around your shoulder. · Inside the scanner you will hear a fan and feel air moving. You may hear tapping, thumping, or snapping noises. You may be given earplugs or headphones to reduce the noise. · You will be asked to hold still during the scan. You may be asked to hold your breath for short periods. · You may be alone in the scanning room, but a technologist will be watching you through a window and talking with you during the test.  What else should you know about the test?  · An MRI does not hurt. · If a dye is used, you may feel a quick sting or pinch and some coolness when the IV is started. The dye may give you a metallic taste in your mouth. Some people feel sick to their stomach or get a headache. · If you breastfeed and are concerned about whether the dye used in this test is safe, talk to your doctor. Most experts believe that very little dye passes into breast milk and even less is passed on to the baby. But if you prefer, you can store some of your breast milk ahead of time and use it for a day or two after the test.  · You may feel warmth in the area being examined. This is normal.  How long does the test take? · The test usually takes 30 to 60 minutes but can take as long as 2 hours. What happens after the test?  · You will probably be able to go home right away, depending on the reason for the test.  · You can go back to your usual activities right away.   Follow-up care is a key part of your treatment and safety. Be sure to make and go to all appointments, and call your doctor if you are having problems. It's also a good idea to keep a list of the medicines you take. Ask your doctor when you can expect to have your test results. Where can you learn more? Go to http://sharath-hank.info/. Enter Y298 in the search box to learn more about \"MRI of the Shoulder: About This Test.\"  Current as of: October 14, 2016  Content Version: 11.3  © 4966-4192 Healthwise, Incorporated. Care instructions adapted under license by [employer] (which disclaims liability or warranty for this information). If you have questions about a medical condition or this instruction, always ask your healthcare professional. [facility] [address] any warranty or liability for your use of this information.

## 2017-08-03 DIAGNOSIS — S43.52XA ACROMIOCLAVICULAR SPRAIN, LEFT, INITIAL ENCOUNTER: ICD-10-CM

## 2017-08-03 DIAGNOSIS — M25.512 ACUTE PAIN OF LEFT SHOULDER: ICD-10-CM

## 2017-08-04 ENCOUNTER — OFFICE VISIT (OUTPATIENT)
Dept: ORTHOPEDIC SURGERY | Facility: CLINIC | Age: 52
End: 2017-08-04

## 2017-08-04 VITALS
SYSTOLIC BLOOD PRESSURE: 144 MMHG | OXYGEN SATURATION: 98 % | WEIGHT: 277.6 LBS | HEIGHT: 74 IN | HEART RATE: 79 BPM | BODY MASS INDEX: 35.63 KG/M2 | DIASTOLIC BLOOD PRESSURE: 82 MMHG | TEMPERATURE: 98.5 F | RESPIRATION RATE: 18 BRPM

## 2017-08-04 DIAGNOSIS — M25.512 ACUTE PAIN OF LEFT SHOULDER: ICD-10-CM

## 2017-08-04 DIAGNOSIS — S43.52XD ACROMIOCLAVICULAR SPRAIN, LEFT, SUBSEQUENT ENCOUNTER: ICD-10-CM

## 2017-08-04 DIAGNOSIS — S93.412D SPRAIN OF CALCANEOFIBULAR LIGAMENT OF LEFT ANKLE, SUBSEQUENT ENCOUNTER: ICD-10-CM

## 2017-08-04 DIAGNOSIS — S46.812A INFRASPINATUS TENDON TEAR, LEFT, INITIAL ENCOUNTER: ICD-10-CM

## 2017-08-04 DIAGNOSIS — M75.122 COMPLETE TEAR OF LEFT ROTATOR CUFF: Primary | ICD-10-CM

## 2017-08-04 RX ORDER — HYDROCODONE BITARTRATE AND ACETAMINOPHEN 7.5; 325 MG/1; MG/1
1-2 TABLET ORAL
Qty: 60 TAB | Refills: 0 | Status: SHIPPED | OUTPATIENT
Start: 2017-08-04 | End: 2017-08-23

## 2017-08-04 NOTE — PATIENT INSTRUCTIONS
Rotator Cuff Repair: Before Your Surgery  What is rotator cuff repair surgery? Rotator cuff repair surgery is done to fix a tear in the rotator cuff. Your doctor may also clean the space between the rotator cuff tendons and the shoulder blade. This is called subacromial smoothing. Your doctor will do either arthroscopic or open surgery. With arthroscopic surgery, your doctor uses a lighted tube with a tiny camera. This tube is called an arthroscope. Your doctor puts it and other surgical tools through small cuts (incisions) in your shoulder. Most people go home the same day they have this surgery. With open surgery, your doctor will make a 2- to 4-inch incision in your shoulder. Most people go home the same day they have this surgery. In both surgeries, the scars usually fade with time. You will wear a sling for a few weeks. You will need physical rehabilitation (rehab). At first, you will get help with the exercises. Later, your doctor or physical therapist will give you exercises to do on your own. Rehab will last several months. It will take 3 to 4 months before you will be able to use your arm normally. It will take 4 to 6 months before you will be able to throw objects or play sports. After surgery and rehab, you will probably have less pain and more strength in your shoulder. You should be able to lift and rotate your arm better. Some people have to avoid lifting heavy objects. If you have a desk job, you will probably be able to return to work or your normal routine in 1 to 2 weeks. If you push, pull, or lift at work, you may be away from work for 3 to 4 months or longer. If you work at a job that involves heavy manual labor, lifting your arms above your head, or using heavy tools, you may have to think about finding other work. Follow-up care is a key part of your treatment and safety. Be sure to make and go to all appointments, and call your doctor if you are having problems.  It's also a good idea to know your test results and keep a list of the medicines you take. What happens before surgery? Surgery can be stressful. This information will help you understand what you can expect. And it will help you safely prepare for surgery. Preparing for surgery  · Understand exactly what surgery is planned, along with the risks, benefits, and other options. · Tell your doctors ALL the medicines, vitamins, supplements, and herbal remedies you take. Some of these can increase the risk of bleeding or interact with anesthesia. · If you take blood thinners, such as warfarin (Coumadin), clopidogrel (Plavix), or aspirin, be sure to talk to your doctor. He or she will tell you if you should stop taking these medicines before your surgery. Make sure that you understand exactly what your doctor wants you to do. · Your doctor will tell you which medicines to take or stop before your surgery. You may need to stop taking certain medicines a week or more before surgery. So talk to your doctor as soon as you can. · If you have an advance directive, let your doctor know. It may include a living will and a durable power of  for health care. Bring a copy to the hospital. If you don't have one, you may want to prepare one. It lets your doctor and loved ones know your health care wishes. Doctors advise that everyone prepare these papers before any type of surgery or procedure. What happens on the day of surgery? · Follow the instructions exactly about when to stop eating and drinking. If you don't, your surgery may be canceled. If your doctor told you to take your medicines on the day of surgery, take them with only a sip of water. · Take a bath or shower before you come in for your surgery. Do not apply lotions, perfumes, deodorants, or nail polish. · Do not shave the surgical site yourself. · Take off all jewelry and piercings. And take out contact lenses, if you wear them.   At the hospital or surgery center  · Bring a picture ID. · The area for surgery is often marked to make sure there are no errors. · You will be kept comfortable and safe by your anesthesia provider. The anesthesia may make you sleep. Or it may just numb the area being worked on. · The surgery will take about 2 hours. Going home  · Be sure you have someone to drive you home. Anesthesia and pain medicine make it unsafe for you to drive. · You will be given more specific instructions about recovering from your surgery. They will cover things like diet, wound care, follow-up care, driving, and getting back to your normal routine. When should you call your doctor? · You have questions or concerns. · You don't understand how to prepare for your surgery. · You become ill before the surgery (such as fever, flu, or a cold). · You need to reschedule or have changed your mind about having the surgery. Where can you learn more? Go to http://sharath-hank.info/. Enter R583 in the search box to learn more about \"Rotator Cuff Repair: Before Your Surgery. \"  Current as of: March 21, 2017  Content Version: 11.3  © 0793-9911 [employer]. Care instructions adapted under license by [employer] (which disclaims liability or warranty for this information). If you have questions about a medical condition or this instruction, always ask your healthcare professional. [address] any warranty or liability for your use of this information.

## 2017-08-04 NOTE — LETTER
8/4/2017 5:01 PM 
 
Mr. Steven Marinelli [address] [facility] [phone] THE ABOVE PATIENT WAS SEEN TODAY AND IS TO REMAIN OUT OF WORK UNTIL January 1ST 2018.  
 
 
Sincerely, 
 
 
Lexie Chin MD

## 2017-08-04 NOTE — PROGRESS NOTES
Patient: Dev Ku                MRN: [id number]       SSN: [id number]  YOB: 1965        AGE: 50 y.o. SEX: male    PCP: No primary care provider on file. 08/04/17    Chief Complaint   Patient presents with    Shoulder Pain     Left     HISTORY:  Dev Ku is a 50 y.o. male who is seen for increased left shoulder and left foot pain. He states that he fell off of an 8 ft. ladder onto a concrete surface on 7/23/17. He was seen at [facility] on 7/24/17. He states severe pain with ROM of his left shoulder. He notes increased shoulder pain at night. Mr Bijan Mitchell states that the doctor at the [facility] ED was concerned about a crack but x rays were not definitive. He notes foot pain and swelling following the injury. He notes pain over the upper left lateral gastrocnemius area. He reports increased pain when wearing the short fracture walker over his left foot. 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 He lives in [place] with his wife and mother in law. He has two grown step children living with them as well--25and 23years old. Current weight is 277 pounds. He is 6'2\" tall. He is not hypertensive or diabetic. No results found for: HBA1C, HGBE8, PMK5QGOI, MWF2YLGA, PKO2FCNI  Weight Metrics 8/4/2017 7/28/2017 7/24/2017   Weight 277 lb 9.6 oz 275 lb 275 lb   BMI 35.64 kg/m2 35.31 kg/m2 35.31 kg/m2       There is no problem list on file for this patient.     REVIEW OF SYSTEMS: All Below are Negative except: See HPI   Constitutional: negative for fever, chills, and weight loss. Cardiovascular: negative for chest pain, claudication, leg swelling, SOB, LAM   Gastrointestinal: Negative for pain, N/V/C/D, Blood in stool or urine, dysuria,  hematuria, incontinence, pelvic pain. Musculoskeletal: See HPI   Neurological: Negative for dizziness and weakness. Negative for headaches, Visual changes, confusion, seizures   Phychiatric/Behavioral: Negative for depression, memory loss, substance  abuse. Extremities: Negative for hair changes, rash, or skin lesion changes. Hematologic: Negative for bleeding problems, bruising, pallor or swollen lymph  nodes   Peripheral Vascular: No calf pain, no circulation deficits. Social History     Social History    Marital status: SINGLE     Spouse name: N/A    Number of children: N/A    Years of education: N/A     Occupational History    Not on file. Social History Main Topics    Smoking status: Never Smoker    Smokeless tobacco: Never Used    Alcohol use Yes      Comment: occasionally    Drug use: No    Sexual activity: Not on file     Other Topics Concern    Not on file     Social History Narrative      No Known Allergies   No current outpatient prescriptions on file. No current facility-administered medications for this visit. PHYSICAL EXAMINATION:  Visit Vitals    /82    Pulse 79    Temp 98.5 °F (36.9 °C) (Oral)    Resp 18    Ht 6' 2\" (1.88 m)    Wt 277 lb 9.6 oz (125.9 kg)    SpO2 98%    BMI 35.64 kg/m2      PHYSICAL EXAM:  Visit Vitals    /82    Pulse 79    Temp 98.5 °F (36.9 °C) (Oral)    Resp 18    Ht 6' 2\" (1.88 m)    Wt 277 lb 9.6 oz (125.9 kg)    SpO2 98%    BMI 35.64 kg/m2       Appearance: Alert, well appearing and pleasant patient who is in no distress, oriented to person, place/time, and who follows commands. HEENT: Bartolo Guadalupe hears well, does not require hearing aids. His sclera of the eyes are non-icteric.  Bartolo Guadalupe is breathing normally and no respiratory accessory muscle use is noted. No JVD present and Neck ROM within normal limits. Psychiatric: Affect and mood are appropriate. Oriented x3  Heart[de-identified]  S1, S2 without murmer, regular rhythm  Lungs:  Breath sounds clear to auscultation  Cardiovascular/Peripheral Vascular: Normal pulses to each foot. Integumentary: No rashes,  wounds, or abrasions. Warm and normal color. No drainage.    Gait: slight limp  Sensory Exam: Intact/Normal Sensation    Lymphatic: No evidence of Lymphedema  Vascular:       Pulses: palpable  Varicosities none  Wounds/Abrasion: None Present  Neuro: Negative, no tremors  ORTHO EXAMINATION:  Examination Right shoulder Left shoulder   Skin Intact Intact   Effusion - -   Biceps deformity - -   Atrophy - -   AC joint tenderness - +   Acromial tenderness + +, lateral   Biceps tenderness - -   Forward flexion/Elevation  30   Active abduction  30   External rotation ROM 30 0   Internal rotation ROM 70 70   Apprehension - -   Impingement - -   Drop Arm Test - -   Neurovascular Intact Intact   No defect over the pectoralis major tendon  Pain with any motion left shoulder  Examination Right knee Left knee   Skin Intact Intact   Range of motion 120-0 120-0   Effusion - -   Medial joint line tenderness + +   Lateral joint line tenderness - -   Popliteal tenderness - -   Osteophytes palpable - -   Alicias - -   Patella crepitus - -   Anterior drawer - -   Lateral laxity - -   Medial laxity - -   Varus deformity - -   Valgus deformity - -   Pretibial edema - 2-3+   Calf tenderness - -   Chuck sign negative    Examination Right Ankle/Foot Left Ankle/Foot   Skin Intact Transverse abrasion of distal third medial tibia    Swelling - -   Dorsiflexion 10 5   Plantarflexion 25 15   Deformity - -   Inversion laxity - -   Anterior drawer - -   Medial tenderness - +   Lateral tenderness - +   Heel cord Intact Intact   Sensation Intact Intact   Bunion - -   Toe nails Normal Normal   Capillary refill Normal Normal   Tenderness over calcaneofibular ligament of left ankle   Wearing a strap up ankle brace on his left ankle     MRI LEFT SHOULDER W CONT 8/3/17  IMPRESSION:   1. Complete tear of the supraspinatus tendon with retraction to the level of the [facility] joint. Edema within the muscle suggest acuity  2. Near complete tear of the infraspinatus tendon with retraction. 3. No significant loss of rotator cuff muscle bulk  4. Mild subscapularis tendinosis with no tear. 5. Labral degeneration with no detached tear. RADIOGRAPHS:  XR LEFT SHOULDER 7/24/17  IMPRESSION:  Three views - No fractures, mild acromioclavicular narrowing, no glenohumeral narrowing, no calcific densities. No dislocation. XR LEFT FOOT 7/24/17  IMPRESSION:  Three views - No fractures, no bunion deformity, no heel spur. Soft tissue swelling. IMPRESSION:      ICD-10-CM ICD-9-CM    1. Complete tear of left rotator cuff M75.122 727.61    2. Acute pain of left shoulder M25.512 719.41    3. Acromioclavicular sprain, left, subsequent encounter S43. 52XD V58.89      840.0    4. Sprain of calcaneofibular ligament of left ankle, subsequent encounter S93.412D V58.89      845.02    5. Supraspinatus tendon tear, left, initial encounter U49.408N 840.6    6. Infraspinatus tendon tear, left, initial encounter X10.648Y 840.3      PLAN:  He will be scheduled for a left rotator cuff repair. Risks of surgery outlined and informed consent obtained. We discussed a possible left foot/ankle MRI in the future if pain continues. He will continue to use an ankle brace for his ankle sprain. He was provided with a note to remain out of work until 1/1/18. He will take Norco for the pain as needed at night.     Scribed by David Fleming ([address]) as dictated by Aren Emanuel MD

## 2017-08-10 DIAGNOSIS — S43.52XD SPRAIN OF LEFT ACROMIOCLAVICULAR JOINT, SUBSEQUENT ENCOUNTER: ICD-10-CM

## 2017-08-10 DIAGNOSIS — Z01.810 PRE-OPERATIVE CARDIOVASCULAR EXAMINATION: ICD-10-CM

## 2017-08-10 DIAGNOSIS — M75.122 COMPLETE ROTATOR CUFF TEAR OF LEFT SHOULDER: Primary | ICD-10-CM

## 2017-08-10 DIAGNOSIS — Z01.811 PRE-OPERATIVE RESPIRATORY EXAMINATION: ICD-10-CM

## 2017-08-10 DIAGNOSIS — Z01.812 BLOOD TESTS PRIOR TO TREATMENT OR PROCEDURE: ICD-10-CM

## 2017-08-10 DIAGNOSIS — M25.512 ACUTE PAIN OF LEFT SHOULDER: ICD-10-CM

## 2017-08-11 DIAGNOSIS — Z01.812 BLOOD TESTS PRIOR TO TREATMENT OR PROCEDURE: ICD-10-CM

## 2017-08-11 DIAGNOSIS — M75.122 COMPLETE ROTATOR CUFF TEAR OF LEFT SHOULDER: Primary | ICD-10-CM

## 2017-08-11 DIAGNOSIS — Z01.811 PRE-OPERATIVE RESPIRATORY EXAMINATION: ICD-10-CM

## 2017-08-11 DIAGNOSIS — Z01.810 PRE-OPERATIVE CARDIOVASCULAR EXAMINATION: ICD-10-CM

## 2017-08-14 ENCOUNTER — HOSPITAL ENCOUNTER (OUTPATIENT)
Dept: OTHER | Age: 52
Discharge: HOME | End: 2017-08-14
Payer: OTHER MISCELLANEOUS

## 2017-08-14 ENCOUNTER — HOSPITAL ENCOUNTER (OUTPATIENT)
Dept: LAB | Age: 52
Discharge: HOME | End: 2017-08-14
Payer: OTHER MISCELLANEOUS

## 2017-08-14 ENCOUNTER — HOSPITAL ENCOUNTER (OUTPATIENT)
Dept: PREADMISSION TESTING | Age: 52
Discharge: HOME | End: 2017-08-14
Payer: OTHER MISCELLANEOUS

## 2017-08-14 DIAGNOSIS — Z01.812 BLOOD TESTS PRIOR TO TREATMENT OR PROCEDURE: ICD-10-CM

## 2017-08-14 DIAGNOSIS — S43.52XD SPRAIN OF LEFT ACROMIOCLAVICULAR JOINT, SUBSEQUENT ENCOUNTER: ICD-10-CM

## 2017-08-14 DIAGNOSIS — M25.512 ACUTE PAIN OF LEFT SHOULDER: ICD-10-CM

## 2017-08-14 DIAGNOSIS — Z01.810 PRE-OPERATIVE CARDIOVASCULAR EXAMINATION: ICD-10-CM

## 2017-08-14 DIAGNOSIS — M75.122 COMPLETE ROTATOR CUFF TEAR OF LEFT SHOULDER: ICD-10-CM

## 2017-08-14 DIAGNOSIS — Z01.811 PRE-OPERATIVE RESPIRATORY EXAMINATION: ICD-10-CM

## 2017-08-14 LAB
ANION GAP BLD CALC-SCNC: 6 MMOL/L (ref 3–18)
ATRIAL RATE: 70 BPM
BASOPHILS # BLD AUTO: 0 K/UL (ref 0–0.06)
BASOPHILS # BLD: 0 % (ref 0–2)
BUN SERPL-MCNC: 16 MG/DL (ref 7–18)
BUN/CREAT SERPL: 13 (ref 12–20)
CALCIUM SERPL-MCNC: 9.5 MG/DL (ref 8.5–10.1)
CALCULATED P AXIS, ECG09: 29 DEGREES
CALCULATED R AXIS, ECG10: 63 DEGREES
CALCULATED T AXIS, ECG11: 56 DEGREES
CHLORIDE SERPL-SCNC: 106 MMOL/L (ref 100–108)
CO2 SERPL-SCNC: 28 MMOL/L (ref 21–32)
CREAT SERPL-MCNC: 1.25 MG/DL (ref 0.6–1.3)
DIAGNOSIS, 93000: NORMAL
DIFFERENTIAL METHOD BLD: NORMAL
EOSINOPHIL # BLD: 0.4 K/UL (ref 0–0.4)
EOSINOPHIL NFR BLD: 4 % (ref 0–5)
ERYTHROCYTE [DISTWIDTH] IN BLOOD BY AUTOMATED COUNT: 12.8 % (ref 11.6–14.5)
GLUCOSE SERPL-MCNC: 89 MG/DL (ref 74–99)
HCT VFR BLD AUTO: 42.9 % (ref 36–48)
HGB BLD-MCNC: 14.4 G/DL (ref 13–16)
LYMPHOCYTES # BLD AUTO: 31 % (ref 21–52)
LYMPHOCYTES # BLD: 2.7 K/UL (ref 0.9–3.6)
MCH RBC QN AUTO: 29.6 PG (ref 24–34)
MCHC RBC AUTO-ENTMCNC: 33.6 G/DL (ref 31–37)
MCV RBC AUTO: 88.3 FL (ref 74–97)
MONOCYTES # BLD: 0.6 K/UL (ref 0.05–1.2)
MONOCYTES NFR BLD AUTO: 7 % (ref 3–10)
NEUTS SEG # BLD: 5.1 K/UL (ref 1.8–8)
NEUTS SEG NFR BLD AUTO: 58 % (ref 40–73)
P-R INTERVAL, ECG05: 180 MS
PLATELET # BLD AUTO: 299 K/UL (ref 135–420)
PMV BLD AUTO: 9.8 FL (ref 9.2–11.8)
POTASSIUM SERPL-SCNC: 4.9 MMOL/L (ref 3.5–5.5)
Q-T INTERVAL, ECG07: 380 MS
QRS DURATION, ECG06: 116 MS
QTC CALCULATION (BEZET), ECG08: 410 MS
RBC # BLD AUTO: 4.86 M/UL (ref 4.7–5.5)
SODIUM SERPL-SCNC: 140 MMOL/L (ref 136–145)
VENTRICULAR RATE, ECG03: 70 BPM
WBC # BLD AUTO: 8.9 K/UL (ref 4.6–13.2)

## 2017-08-14 PROCEDURE — 71020 XR CHEST PA LAT: CPT

## 2017-08-14 PROCEDURE — 93005 ELECTROCARDIOGRAM TRACING: CPT

## 2017-08-14 PROCEDURE — 80048 BASIC METABOLIC PNL TOTAL CA: CPT | Performed by: PHYSICIAN ASSISTANT

## 2017-08-14 PROCEDURE — 85025 COMPLETE CBC W/AUTO DIFF WBC: CPT | Performed by: PHYSICIAN ASSISTANT

## 2017-08-14 PROCEDURE — 36415 COLL VENOUS BLD VENIPUNCTURE: CPT | Performed by: PHYSICIAN ASSISTANT

## 2017-08-15 ENCOUNTER — OFFICE VISIT (OUTPATIENT)
Dept: INTERNAL MEDICINE CLINIC | Age: 52
End: 2017-08-15

## 2017-08-15 VITALS
OXYGEN SATURATION: 96 % | DIASTOLIC BLOOD PRESSURE: 83 MMHG | WEIGHT: 280.2 LBS | HEIGHT: 74 IN | TEMPERATURE: 98.1 F | RESPIRATION RATE: 16 BRPM | HEART RATE: 85 BPM | SYSTOLIC BLOOD PRESSURE: 114 MMHG | BODY MASS INDEX: 35.96 KG/M2

## 2017-08-15 DIAGNOSIS — Z01.818 PREOP EXAMINATION: Primary | ICD-10-CM

## 2017-08-15 NOTE — PROGRESS NOTES
ROOM # 2    Xuan Alvarado presents today for   Chief Complaint   Patient presents with    Pre-op Exam     left rotator cuff repair, Dr. Joseline Tripathi 8/22/17       Xuan Jenny preferred language for health care discussion is english/other. Is someone accompanying this pt? Yes, wife    Is the patient using any DME equipment during [address]? Arm sling    Depression Screening:  PHQ over the last two weeks 8/15/2017   Little interest or pleasure in doing things Not at all   Feeling down, depressed or hopeless Not at all   Total Score PHQ 2 0       Learning Assessment:  Learning Assessment 8/15/2017   PRIMARY LEARNER Patient   HIGHEST LEVEL OF EDUCATION - PRIMARY LEARNER  DID NOT GRADUATE HIGH SCHOOL   BARRIERS PRIMARY LEARNER NONE   CO-LEARNER CAREGIVER No   PRIMARY LANGUAGE ENGLISH   LEARNER PREFERENCE PRIMARY VIDEOS     PICTURES     DEMONSTRATION   ANSWERED BY patient   RELATIONSHIP SELF       Abuse Screening:  No flowsheet data found. Fall Risk  No flowsheet data found. Health Maintenance reviewed and discussed per provider. Yes    Xuan Alvarado is due for FOBT. Please order/place referral if appropriate. Advance Directive:  1. Do you have an advance directive in place? Patient Reply: no    2. If not, would you like material regarding how to put one in place? Patient Reply: no    Coordination of Care:  1. Have you been to the ER, urgent care clinic since your last visit? Hospitalized since your last visit? [facility] ED for fall off of a ladder    2. Have you seen or consulted any other health care providers outside of the [address] [place] [address] Singh since your last visit? Include any pap smears or colon screening.  Dr. Michele Buckner, ortho

## 2017-08-15 NOTE — MR AVS SNAPSHOT
Visit Information Date & Time Provider Department Dept. Phone Encounter #  
 8/15/2017  1:15 PM Karen Parra MD [facility] [phone] [id number] Follow-up Instructions Return if symptoms worsen or fail to improve. Your Appointments 8/21/2017 10:30 AM  
HISTORY AND PHYSICAL with Ryan Ledesma PA-C  
[facility]) Appt Note: MV OP -23HR SX 8-22-17 OPEN LEFT SHOULDER ROTATOR CUFF REPAIR/DMM  
 [address], [address]  
[phone]  
  
   
 [address], [address] [address]  
  
    
 8/25/2017 10:00 AM  
POST OP with Ryan Ledesma PA-C  
[facility] ([facility]) Appt Note: S/P MV OP 23-HR SX 8-22-17 OPEN LEFT SHOULDER RCR/DMM  
 [address], [address] [facility] [address]  
[phone]  
  
   
 [address], [address] [address] Upcoming Health Maintenance Date Due FOBT Q 1 YEAR AGE 50-75 1/8/2017 INFLUENZA AGE 9 TO ADULT 9/15/2017* DTaP/Tdap/Td series (2 - Td) 7/25/2027 *Topic was postponed. The date shown is not the original due date. Allergies as of 8/15/2017  Review Complete On: 8/15/2017 By: Karen Parra MD  
 No Known Allergies Current Immunizations  Never Reviewed No immunizations on file. Not reviewed this visit Vitals BP Pulse Temp Resp Height(growth percentile) Weight(growth percentile) 114/83 (BP 1 Location: Right arm, BP Patient Position: Sitting) 85 98.1 °F (36.7 °C) (Oral) 16 6' 2\" (1.88 m) 280 lb 3.2 oz (127.1 kg) SpO2 BMI Smoking Status 96% 35.98 kg/m2 Never Smoker Vitals History BMI and BSA Data Body Mass Index Body Surface Area 35.98 kg/m 2 2.58 m 2 Preferred Pharmacy Pharmacy Name Phone  [place] [address] [facility] [place] [phone] Your Updated Medication List  
  
   
This list is accurate as of: 8/15/17  1:28 PM.  Always use your most recent med list.  
  
  
  
  
 HYDROcodone-acetaminophen 7.5-325 mg per tablet Commonly known as:  Kris Collar Take 1-2 Tabs by mouth nightly as needed for Pain. Max Daily Amount: 2 Tabs. Follow-up Instructions Return if symptoms worsen or fail to improve. Patient Instructions 1) follow-up as needed or sooner if worsening symptoms. Introducing [facility] SERVICES! Priya Poole introduces [employer] patient portal. Now you can access parts of your medical record, email your doctor's office, and request medication refills online. 1. In your internet browser, go to [email or web address] 2. Click on the First Time User? Click Here link in the Sign In box. You will see the New Member Sign Up page. 3. Enter your [employer] Access Code exactly as it appears below. You will not need to use this code after youve completed the sign-up process. If you do not sign up before the expiration date, you must request a new code. · [employer] Access Code: [email or web address] Expires: 10/22/2017  3:55 PM 
 
4. Enter the last four digits of your Social Security Number (xxxx) and Date of Birth (mm/dd/yyyy) as indicated and click Submit. You will be taken to the next sign-up page. 5. Create a [employer] ID. This will be your [employer] login ID and cannot be changed, so think of one that is secure and easy to remember. 6. Create a [employer] password. You can change your password at any time. 7. Enter your Password Reset Question and Answer. This can be used at a later time if you forget your password. 8. Enter your e-mail address. You will receive e-mail notification when new information is available in [address] 9. Click Sign Up. You can now view and download portions of your medical record. 10. Click the Download Summary menu link to download a portable copy of your medical information. If you have questions, please visit the Frequently Asked Questions section of the [employer] website. Remember, [employer] is NOT to be used for urgent needs. For medical emergencies, dial 911. Now available from your iPhone and Android! Please provide this summary of care documentation to your next provider. Your primary care clinician is listed as [place]. If you have any questions after today's visit, please call [phone].

## 2017-08-15 NOTE — PROGRESS NOTES
Chief Complaint   Patient presents with    Pre-op Exam     left rotator cuff repair, Dr. Abbey Caruso 8/22/17         HPI:     Erich Solis is a 50 y.o.  male with history of left rotator cuff tear here for the above complaint. He is surgery on 8/22/17 with Dr. Leeann Douglas and will being having an open left shoulder distal clavicle resection/acrominioplasty with rotator cuff repair under anesthesia. He fell of a ladder on 7/23/17 and he stuck his arm out and landed on his shoulder. He also hurt his left foot/ankle. They are saying is sprain. Pain scale:5/10  The pain comes and goes. Radiates down arm and up shoulder and arm. Dull ache and what goes down arm is quick and sharp pain, numbness and tingling in upper arm area. He can walk long distances without any problems and no chest pain or shortness of breath. No RENEE. He has snoring, but no apenic spells or loud snoring. No problems with anesthesia or DVT or PE. He is accompanied by his wife. He denies any abdominal pain, headaches, dizziness. Past Medical History:   Diagnosis Date    Torn rotator cuff 08/2017    left rotator cuff tear       Past Surgical History:   Procedure Laterality Date    HAND/FINGER SURGERY UNLISTED      HX KNEE ARTHROSCOPY  1995    right knee    HX OTHER SURGICAL  2000    finger surgery, right 3rd finger. Reattach tendons           MEDICATION ALLERGIES/INTOLERANCES:   No Known Allergies          CURRENT MEDICATIONS:    Current Outpatient Prescriptions   Medication Sig    HYDROcodone-acetaminophen (NORCO) 7.5-325 mg per tablet Take 1-2 Tabs by mouth nightly as needed for Pain. Max Daily Amount: 2 Tabs. No current facility-administered medications for this visit.         Health Maintenance   Topic Date Due    FOBT Q 1 YEAR AGE 50-75  01/08/2017    INFLUENZA AGE 9 TO ADULT  09/15/2017 (Originally 8/1/2017)    DTaP/Tdap/Td series (2 - Td) 07/25/2027         FAMILY HISTORY:   Family History   Problem Relation Age of Onset    Cancer Father      colon ca at age 77 yo    Diabetes Sister        SOCIAL HISTORY:   He  reports that he has never smoked. He has never used smokeless tobacco.  He  reports that he drinks alcohol. ROS:   General: negative for - chills, fatigue, fever, weight loss or weight gain, night sweats  HEENT: negative for - no sore throat, nasal congestion, vision problems or ear problems  Resp: negative for - cough, shortness of breath or wheezing  CV: negative for - chest pain, palpitations, orthopnea or PND,  GI: negative for - abdominal pain, change in bowel habits, constipation, diarrhea, blood or black tarry stools, or nausea/vomiting  : negative for - dysuria, hematuria, increase urgency and frequency, nocturia  Heme: negative for -excessive bleeding or bruising  Endo: negative for - polydipsia/polyuria or hot or cold intolerance  MSK: negative for - joint pain, joint swelling or muscle pain  Neuro: negative for - numbness, tingling, or dizziness, positive for headaches. Derm: negative for - dry skin, hair changes, rash or skin lesion changes  Psych: negative for - anxiety, depression, irritability or mood swings, insomnia    OBJECTIVE:  PHYSICAL EXAM: Vitals:   Vitals:    08/15/17 1301   BP: 114/83   Pulse: 85   Resp: 16   Temp: 98.1 °F (36.7 °C)   TempSrc: Oral   SpO2: 96%   Weight: 280 lb 3.2 oz (127.1 kg)   Height: 6' 2\" (1.88 m)     Exam:   Generally: Pleasant male in no acute distress    Cardiac exam: regular, rate, and rhythm. No murmurs, gallops, or rubs. Normal S1 and S2.    Pulmonary exam: Clear to ausculation bilaterally    Abdominal exam: Positive bowel sounds in all four quadrants. Soft. Nondistended. Nontender. No hepatosplenomegaly. Extremities: 2+ dorsalis pedis bilaterally. No pedal edema bilaterally.          LABS/RADIOLOGICAL TESTS:   Lab Results   Component Value Date/Time    WBC 8.9 08/14/2017 11:33 AM    HGB 14.4 08/14/2017 11:33 AM    HCT 42.9 08/14/2017 11:33 AM PLATELET 704 34/68/0551 11:33 AM     Lab Results   Component Value Date/Time    Sodium 140 08/14/2017 11:33 AM    Potassium 4.9 08/14/2017 11:33 AM    Chloride 106 08/14/2017 11:33 AM    CO2 28 08/14/2017 11:33 AM    Glucose 89 08/14/2017 11:33 AM    BUN 16 08/14/2017 11:33 AM    Creatinine 1.25 08/14/2017 11:33 AM     No results found for: CHOL, CHOLX, CHLST, CHOLV, HDL, LDL, LDLC, DLDLP, TGLX, TRIGL, TRIGP  No results found for: GPT     EKG: NSR, no ST changes, or T waves. All lab results and EKG were reviewed and discussed with the patient. ASSESSMENT/PLAN:      ICD-10-CM ICD-9-CM    1. Preop examination Z01.818 V72.84 Pt is mild risk for moderate procedure. He is cleared for surgery. Afford to refer patient for colonoscopy. He refused at this time. He did not want to set up appt. For CPE. 2. Patient verbalized understanding and agreement with the plan. 3. Patient was given an after visit summary. 4.   Follow-up Disposition:  Return if symptoms worsen or fail to improve. or sooner if worsening symptoms. Marsha Giles MD      Addendum:  Result Information   Status Provider Status      Final result (Exam End: 8/14/2017 12:50 PM) Reviewed    Study Result   CHEST PA AND LATERAL     Indication:  Preop exam.     Comparison: None.     Findings: A frontal and 2 lateral views obtained, details slightly limited by  patient's body habitus. The lungs appear clear. No pneumothorax. Cardiac  silhouette and pulmonary vascularity are within normal limits. Costophrenic  angles are sharp.       IMPRESSION  IMPRESSION:     No acute cardiopulmonary disease identified.

## 2017-08-22 ENCOUNTER — ANESTHESIA EVENT (OUTPATIENT)
Dept: SURGERY | Age: 52
DRG: 502 | End: 2017-08-22
Payer: OTHER MISCELLANEOUS

## 2017-08-22 ENCOUNTER — ANESTHESIA (OUTPATIENT)
Dept: SURGERY | Age: 52
DRG: 502 | End: 2017-08-22
Payer: OTHER MISCELLANEOUS

## 2017-08-22 ENCOUNTER — HOSPITAL ENCOUNTER (INPATIENT)
Age: 52
LOS: 1 days | Discharge: HOME | DRG: 502 | End: 2017-08-23
Attending: SPECIALIST | Admitting: SPECIALIST
Payer: OTHER MISCELLANEOUS

## 2017-08-22 PROBLEM — Z98.890 S/P ROTATOR CUFF REPAIR: Status: ACTIVE | Noted: 2017-08-22

## 2017-08-22 PROCEDURE — 77030008467 HC STPLR SKN COVD -B: Performed by: SPECIALIST

## 2017-08-22 PROCEDURE — 64415 NJX AA&/STRD BRCH PLXS IMG: CPT | Performed by: ANESTHESIOLOGY

## 2017-08-22 PROCEDURE — 77030003029 HC SUT VCRL J&J -B: Performed by: SPECIALIST

## 2017-08-22 PROCEDURE — 77030008189 HC RTVR SUT ARTH CNMD -B: Performed by: SPECIALIST

## 2017-08-22 PROCEDURE — 77030027138 HC INCENT SPIROMETER -A

## 2017-08-22 PROCEDURE — 0PBB0ZZ EXCISION OF LEFT CLAVICLE, OPEN APPROACH: ICD-10-PCS | Performed by: SPECIALIST

## 2017-08-22 PROCEDURE — C1713 ANCHOR/SCREW BN/BN,TIS/BN: HCPCS | Performed by: SPECIALIST

## 2017-08-22 PROCEDURE — 77030026438 HC STYL ET INTUB CARD -A: Performed by: ANESTHESIOLOGY

## 2017-08-22 PROCEDURE — 77030002922 HC SUT FBRWRE ARTH -B: Performed by: SPECIALIST

## 2017-08-22 PROCEDURE — 77030018836 HC SOL IRR NACL ICUM -A: Performed by: SPECIALIST

## 2017-08-22 PROCEDURE — 74011250636 HC RX REV CODE- 250/636: Performed by: NURSE ANESTHETIST, CERTIFIED REGISTERED

## 2017-08-22 PROCEDURE — 77030011256 HC DRSG MEPILEX <16IN NO BORD MOLN -A: Performed by: SPECIALIST

## 2017-08-22 PROCEDURE — 76942 ECHO GUIDE FOR BIOPSY: CPT | Performed by: ANESTHESIOLOGY

## 2017-08-22 PROCEDURE — 77030004505 HC CATH AIRWY EXCHG COOK -B: Performed by: ANESTHESIOLOGY

## 2017-08-22 PROCEDURE — 0LM20ZZ REATTACHMENT OF LEFT SHOULDER TENDON, OPEN APPROACH: ICD-10-PCS | Performed by: SPECIALIST

## 2017-08-22 PROCEDURE — 65270000029 HC RM PRIVATE

## 2017-08-22 PROCEDURE — 76010000131 HC OR TIME 2 TO 2.5 HR: Performed by: SPECIALIST

## 2017-08-22 PROCEDURE — 0RNK0ZZ RELEASE LEFT SHOULDER JOINT, OPEN APPROACH: ICD-10-PCS | Performed by: SPECIALIST

## 2017-08-22 PROCEDURE — L3650 SO 8 ABD RESTRAINT PRE OTS: HCPCS | Performed by: SPECIALIST

## 2017-08-22 PROCEDURE — 74011250636 HC RX REV CODE- 250/636

## 2017-08-22 PROCEDURE — 77030032490 HC SLV COMPR SCD KNE COVD -B: Performed by: SPECIALIST

## 2017-08-22 PROCEDURE — 74011000250 HC RX REV CODE- 250: Performed by: NURSE ANESTHETIST, CERTIFIED REGISTERED

## 2017-08-22 PROCEDURE — 77030006773 HC BLD SAW OSC BRSM -A: Performed by: SPECIALIST

## 2017-08-22 PROCEDURE — 74011000250 HC RX REV CODE- 250

## 2017-08-22 PROCEDURE — 76210000016 HC OR PH I REC 1 TO 1.5 HR: Performed by: SPECIALIST

## 2017-08-22 PROCEDURE — 76060000035 HC ANESTHESIA 2 TO 2.5 HR: Performed by: SPECIALIST

## 2017-08-22 PROCEDURE — 74011250636 HC RX REV CODE- 250/636: Performed by: SPECIALIST

## 2017-08-22 PROCEDURE — 77030013079 HC BLNKT BAIR HGGR 3M -A: Performed by: ANESTHESIOLOGY

## 2017-08-22 PROCEDURE — 77030008683 HC TU ET CUF COVD -A: Performed by: ANESTHESIOLOGY

## 2017-08-22 PROCEDURE — 74011250636 HC RX REV CODE- 250/636: Performed by: ANESTHESIOLOGY

## 2017-08-22 PROCEDURE — 74011258636 HC RX REV CODE- 258/636: Performed by: SPECIALIST

## 2017-08-22 PROCEDURE — 77030008462 HC STPLR SKN PROX J&J -A: Performed by: SPECIALIST

## 2017-08-22 PROCEDURE — 74011250637 HC RX REV CODE- 250/637: Performed by: SPECIALIST

## 2017-08-22 PROCEDURE — 77030003601 HC NDL NRV BLK BBMI -A: Performed by: SPECIALIST

## 2017-08-22 PROCEDURE — 77030011640 HC PAD GRND REM COVD -A: Performed by: SPECIALIST

## 2017-08-22 DEVICE — ANCHOR SUT DIA2.9MM NO2 MAXBRAID DBL LD JUGGERKNOT: Type: IMPLANTABLE DEVICE | Site: ARM | Status: FUNCTIONAL

## 2017-08-22 RX ORDER — SODIUM CHLORIDE 0.9 % (FLUSH) 0.9 %
5-10 SYRINGE (ML) INJECTION EVERY 8 HOURS
Status: DISCONTINUED | OUTPATIENT
Start: 2017-08-22 | End: 2017-08-23 | Stop reason: HOSPADM

## 2017-08-22 RX ORDER — OXYCODONE AND ACETAMINOPHEN 5; 325 MG/1; MG/1
1 TABLET ORAL
Status: DISCONTINUED | OUTPATIENT
Start: 2017-08-22 | End: 2017-08-23

## 2017-08-22 RX ORDER — SUCCINYLCHOLINE CHLORIDE 20 MG/ML
INJECTION INTRAMUSCULAR; INTRAVENOUS AS NEEDED
Status: DISCONTINUED | OUTPATIENT
Start: 2017-08-22 | End: 2017-08-22 | Stop reason: HOSPADM

## 2017-08-22 RX ORDER — MIDAZOLAM HYDROCHLORIDE 1 MG/ML
2 INJECTION, SOLUTION INTRAMUSCULAR; INTRAVENOUS ONCE
Status: COMPLETED | OUTPATIENT
Start: 2017-08-22 | End: 2017-08-22

## 2017-08-22 RX ORDER — GLYCOPYRROLATE 0.2 MG/ML
INJECTION INTRAMUSCULAR; INTRAVENOUS AS NEEDED
Status: DISCONTINUED | OUTPATIENT
Start: 2017-08-22 | End: 2017-08-22 | Stop reason: HOSPADM

## 2017-08-22 RX ORDER — FENTANYL CITRATE 50 UG/ML
50 INJECTION, SOLUTION INTRAMUSCULAR; INTRAVENOUS
Status: DISCONTINUED | OUTPATIENT
Start: 2017-08-22 | End: 2017-08-22 | Stop reason: HOSPADM

## 2017-08-22 RX ORDER — SODIUM CHLORIDE 0.9 % (FLUSH) 0.9 %
5-10 SYRINGE (ML) INJECTION AS NEEDED
Status: DISCONTINUED | OUTPATIENT
Start: 2017-08-22 | End: 2017-08-22 | Stop reason: HOSPADM

## 2017-08-22 RX ORDER — CEFAZOLIN SODIUM 2 G/50ML
2 SOLUTION INTRAVENOUS EVERY 8 HOURS
Status: COMPLETED | OUTPATIENT
Start: 2017-08-23 | End: 2017-08-23

## 2017-08-22 RX ORDER — HYDROMORPHONE HYDROCHLORIDE 2 MG/ML
0.5 INJECTION, SOLUTION INTRAMUSCULAR; INTRAVENOUS; SUBCUTANEOUS
Status: DISCONTINUED | OUTPATIENT
Start: 2017-08-22 | End: 2017-08-22 | Stop reason: HOSPADM

## 2017-08-22 RX ORDER — ROCURONIUM BROMIDE 10 MG/ML
INJECTION, SOLUTION INTRAVENOUS AS NEEDED
Status: DISCONTINUED | OUTPATIENT
Start: 2017-08-22 | End: 2017-08-22 | Stop reason: HOSPADM

## 2017-08-22 RX ORDER — PROPOFOL 10 MG/ML
INJECTION, EMULSION INTRAVENOUS AS NEEDED
Status: DISCONTINUED | OUTPATIENT
Start: 2017-08-22 | End: 2017-08-22 | Stop reason: HOSPADM

## 2017-08-22 RX ORDER — NALOXONE HYDROCHLORIDE 0.4 MG/ML
0.1 INJECTION, SOLUTION INTRAMUSCULAR; INTRAVENOUS; SUBCUTANEOUS AS NEEDED
Status: DISCONTINUED | OUTPATIENT
Start: 2017-08-22 | End: 2017-08-22 | Stop reason: HOSPADM

## 2017-08-22 RX ORDER — SODIUM CHLORIDE 0.9 % (FLUSH) 0.9 %
5-10 SYRINGE (ML) INJECTION AS NEEDED
Status: DISCONTINUED | OUTPATIENT
Start: 2017-08-22 | End: 2017-08-23 | Stop reason: HOSPADM

## 2017-08-22 RX ORDER — RANITIDINE HCL 75 MG
75 TABLET ORAL
COMMUNITY
End: 2018-02-19 | Stop reason: ALTCHOICE

## 2017-08-22 RX ORDER — NEOSTIGMINE METHYLSULFATE 5 MG/5 ML
SYRINGE (ML) INTRAVENOUS AS NEEDED
Status: DISCONTINUED | OUTPATIENT
Start: 2017-08-22 | End: 2017-08-22 | Stop reason: HOSPADM

## 2017-08-22 RX ORDER — DEXTROSE, SODIUM CHLORIDE, SODIUM LACTATE, POTASSIUM CHLORIDE, AND CALCIUM CHLORIDE 5; .6; .31; .03; .02 G/100ML; G/100ML; G/100ML; G/100ML; G/100ML
75 INJECTION, SOLUTION INTRAVENOUS CONTINUOUS
Status: DISCONTINUED | OUTPATIENT
Start: 2017-08-22 | End: 2017-08-23 | Stop reason: HOSPADM

## 2017-08-22 RX ORDER — DEXAMETHASONE SODIUM PHOSPHATE 4 MG/ML
4 INJECTION, SOLUTION INTRA-ARTICULAR; INTRALESIONAL; INTRAMUSCULAR; INTRAVENOUS; SOFT TISSUE
Status: DISCONTINUED | OUTPATIENT
Start: 2017-08-22 | End: 2017-08-22 | Stop reason: HOSPADM

## 2017-08-22 RX ORDER — FENTANYL CITRATE 50 UG/ML
100 INJECTION, SOLUTION INTRAMUSCULAR; INTRAVENOUS ONCE
Status: COMPLETED | OUTPATIENT
Start: 2017-08-22 | End: 2017-08-22

## 2017-08-22 RX ORDER — ONDANSETRON 2 MG/ML
INJECTION INTRAMUSCULAR; INTRAVENOUS AS NEEDED
Status: DISCONTINUED | OUTPATIENT
Start: 2017-08-22 | End: 2017-08-22 | Stop reason: HOSPADM

## 2017-08-22 RX ORDER — FENTANYL CITRATE 50 UG/ML
INJECTION, SOLUTION INTRAMUSCULAR; INTRAVENOUS AS NEEDED
Status: DISCONTINUED | OUTPATIENT
Start: 2017-08-22 | End: 2017-08-22 | Stop reason: HOSPADM

## 2017-08-22 RX ORDER — ROPIVACAINE HYDROCHLORIDE 5 MG/ML
30 INJECTION, SOLUTION EPIDURAL; INFILTRATION; PERINEURAL
Status: COMPLETED | OUTPATIENT
Start: 2017-08-22 | End: 2017-08-22

## 2017-08-22 RX ORDER — SODIUM CHLORIDE 0.9 % (FLUSH) 0.9 %
5-10 SYRINGE (ML) INJECTION EVERY 8 HOURS
Status: DISCONTINUED | OUTPATIENT
Start: 2017-08-22 | End: 2017-08-22 | Stop reason: HOSPADM

## 2017-08-22 RX ORDER — LIDOCAINE HYDROCHLORIDE 20 MG/ML
INJECTION, SOLUTION EPIDURAL; INFILTRATION; INTRACAUDAL; PERINEURAL AS NEEDED
Status: DISCONTINUED | OUTPATIENT
Start: 2017-08-22 | End: 2017-08-22 | Stop reason: HOSPADM

## 2017-08-22 RX ORDER — ACETAMINOPHEN 325 MG/1
650 TABLET ORAL
Status: DISCONTINUED | OUTPATIENT
Start: 2017-08-22 | End: 2017-08-23 | Stop reason: HOSPADM

## 2017-08-22 RX ORDER — DOCUSATE SODIUM 100 MG/1
100 CAPSULE, LIQUID FILLED ORAL 2 TIMES DAILY
Status: DISCONTINUED | OUTPATIENT
Start: 2017-08-22 | End: 2017-08-23 | Stop reason: HOSPADM

## 2017-08-22 RX ORDER — SODIUM CHLORIDE, SODIUM LACTATE, POTASSIUM CHLORIDE, CALCIUM CHLORIDE 600; 310; 30; 20 MG/100ML; MG/100ML; MG/100ML; MG/100ML
75 INJECTION, SOLUTION INTRAVENOUS CONTINUOUS
Status: DISCONTINUED | OUTPATIENT
Start: 2017-08-22 | End: 2017-08-22 | Stop reason: HOSPADM

## 2017-08-22 RX ORDER — FAMOTIDINE 20 MG/1
20 TABLET, FILM COATED ORAL
Status: DISCONTINUED | OUTPATIENT
Start: 2017-08-23 | End: 2017-08-23 | Stop reason: HOSPADM

## 2017-08-22 RX ORDER — SODIUM CHLORIDE, SODIUM LACTATE, POTASSIUM CHLORIDE, CALCIUM CHLORIDE 600; 310; 30; 20 MG/100ML; MG/100ML; MG/100ML; MG/100ML
100 INJECTION, SOLUTION INTRAVENOUS CONTINUOUS
Status: DISCONTINUED | OUTPATIENT
Start: 2017-08-22 | End: 2017-08-22 | Stop reason: HOSPADM

## 2017-08-22 RX ORDER — RANITIDINE HCL 75 MG
75 TABLET ORAL
Status: DISCONTINUED | OUTPATIENT
Start: 2017-08-22 | End: 2017-08-22 | Stop reason: CLARIF

## 2017-08-22 RX ORDER — HYDROMORPHONE HYDROCHLORIDE 1 MG/ML
1 INJECTION, SOLUTION INTRAMUSCULAR; INTRAVENOUS; SUBCUTANEOUS
Status: DISCONTINUED | OUTPATIENT
Start: 2017-08-22 | End: 2017-08-23

## 2017-08-22 RX ADMIN — SODIUM CHLORIDE, SODIUM LACTATE, POTASSIUM CHLORIDE, AND CALCIUM CHLORIDE 75 ML/HR: 600; 310; 30; 20 INJECTION, SOLUTION INTRAVENOUS at 13:23

## 2017-08-22 RX ADMIN — OXYCODONE HYDROCHLORIDE AND ACETAMINOPHEN 1 TABLET: 5; 325 TABLET ORAL at 21:54

## 2017-08-22 RX ADMIN — MIDAZOLAM HYDROCHLORIDE 2 MG: 1 INJECTION, SOLUTION INTRAMUSCULAR; INTRAVENOUS at 14:29

## 2017-08-22 RX ADMIN — ROCURONIUM BROMIDE 35 MG: 10 INJECTION, SOLUTION INTRAVENOUS at 17:51

## 2017-08-22 RX ADMIN — LIDOCAINE HYDROCHLORIDE 60 MG: 20 INJECTION, SOLUTION EPIDURAL; INFILTRATION; INTRACAUDAL; PERINEURAL at 17:45

## 2017-08-22 RX ADMIN — FAMOTIDINE 20 MG: 10 INJECTION, SOLUTION INTRAVENOUS at 13:22

## 2017-08-22 RX ADMIN — Medication 10 ML: at 22:06

## 2017-08-22 RX ADMIN — ROPIVACAINE HYDROCHLORIDE 150 MG: 5 INJECTION, SOLUTION EPIDURAL; INFILTRATION; PERINEURAL at 14:31

## 2017-08-22 RX ADMIN — PROPOFOL 200 MG: 10 INJECTION, EMULSION INTRAVENOUS at 17:46

## 2017-08-22 RX ADMIN — SUCCINYLCHOLINE CHLORIDE 140 MG: 20 INJECTION INTRAMUSCULAR; INTRAVENOUS at 17:47

## 2017-08-22 RX ADMIN — GLYCOPYRROLATE 0.8 MG: 0.2 INJECTION INTRAMUSCULAR; INTRAVENOUS at 19:48

## 2017-08-22 RX ADMIN — ONDANSETRON 4 MG: 2 INJECTION INTRAMUSCULAR; INTRAVENOUS at 19:43

## 2017-08-22 RX ADMIN — FENTANYL CITRATE 100 MCG: 50 INJECTION, SOLUTION INTRAMUSCULAR; INTRAVENOUS at 17:43

## 2017-08-22 RX ADMIN — HYDROMORPHONE HYDROCHLORIDE 0.5 MG: 2 INJECTION, SOLUTION INTRAMUSCULAR; INTRAVENOUS; SUBCUTANEOUS at 20:54

## 2017-08-22 RX ADMIN — ROCURONIUM BROMIDE 5 MG: 10 INJECTION, SOLUTION INTRAVENOUS at 17:45

## 2017-08-22 RX ADMIN — Medication 4 MG: at 19:48

## 2017-08-22 RX ADMIN — SODIUM CHLORIDE, SODIUM LACTATE, POTASSIUM CHLORIDE, CALCIUM CHLORIDE, AND DEXTROSE MONOHYDRATE 75 ML/HR: 600; 310; 30; 20; 5 INJECTION, SOLUTION INTRAVENOUS at 21:57

## 2017-08-22 RX ADMIN — Medication 3 G: at 17:41

## 2017-08-22 RX ADMIN — FENTANYL CITRATE 100 MCG: 50 INJECTION INTRAMUSCULAR; INTRAVENOUS at 14:29

## 2017-08-22 RX ADMIN — DOCUSATE SODIUM 100 MG: 100 CAPSULE, LIQUID FILLED ORAL at 21:54

## 2017-08-22 RX ADMIN — FAMOTIDINE 20 MG: 20 TABLET ORAL at 23:43

## 2017-08-22 RX ADMIN — FENTANYL CITRATE 50 MCG: 50 INJECTION, SOLUTION INTRAMUSCULAR; INTRAVENOUS at 19:11

## 2017-08-22 NOTE — ANESTHESIA PROCEDURE NOTES
Peripheral Block    Start time: 8/22/2017 2:24 PM  End time: 8/22/2017 2:34 PM  Performed by: Jennifer Flood  Authorized by: Jennifer Flood       Pre-procedure: Indications: at surgeon's request, post-op pain management and procedure for pain    Preanesthetic Checklist: patient identified, risks and benefits discussed, site marked, timeout performed, anesthesia consent given and patient being monitored      Block Type:   Block Type:  Brachial plexus  Laterality:  Left  Monitoring:  Standard ASA monitoring, continuous pulse ox, frequent vital sign checks, oxygen, responsive to questions and heart rate  Injection Technique:  Single shot  Procedures: ultrasound guided and nerve stimulator    Prep: chlorhexidine    Needle Type:  Stimuplex  Needle Gauge:  22 G  Needle Localization:  Ultrasound guidance and nerve stimulator  Motor Response: minimal motor response >0.4 mA    Medication Injected:  0.5%  ropivacaine  Volume (mL):  30    Assessment:  Number of attempts:  1  Injection Assessment:  No intravascular symptoms, negative aspiration for blood, local visualized surrounding nerve on ultrasound, ultrasound image on chart, no paresthesia and incremental injection every 5 mL  Patient tolerance:  Patient tolerated the procedure well with no immediate complications  Single Shot Nerve Block Procedure Note    Patient: Keila Torres MRN: [id number]  SSN: [id number]   YOB: 1965  Age: 50 y.o. Sex: male      Cardiovascular Function/Vital Signs  There were no vitals taken for this visit. Blocks Brachial Plexus - Interscalene  Referring physician:   : Yovani Santiago MD    Indication: Post-operative analgesia per surgeon's request  Location: Preoperative Holding area. Sedation: Midazolam 2mg, fentanyl 100mcg    Time out performed, correct patient, side, site, and procedure verified. Patient placed in supine  position.  Monitors/oxygen applied; left neck  marked and prepped with chloraprep. Target nerves identified by nerve stimulator and ultrasound. 30 ml's of 0.5% Ropivicaine  injected in divided doses with negative aspiration through 22 gauge 2 inch  Stimuplex insulated needle. Nerve stimulator set up 1.5 mA, 0.1 mS, 2 HZ. Twitch lost at 0.48 mA. Block Notes:   Incremental injection    Blood aspirated:  No    Persistent Pain with injection:  No    Resistance to injection:  No    Events:  None - Easy and well-tolerated: Yed       Difficult:  No  Ultrasound guidance used for needle placement  Nerves and surrounding structures ID'd  Perineural injection   No IV injection  Patient tolerated procedure well, vital signs stable throughout, with no apparent complications.   8/22/2017  2:44 PM  Kalyan Alvarado MD

## 2017-08-22 NOTE — IP AVS SNAPSHOT
Jw Salas 
 
 
 [address] Patient: Xuan Alvarado MRN: [id number] :1965 You are allergic to the following No active allergies Recent Documentation Height Weight BMI Smoking Status 1.88 m 126.6 kg 35.84 kg/m2 Never Smoker Emergency Contacts Name Discharge Info Relation Home Work Mobile Almaz Madison DISCHARGE CAREGIVER [3] Friend [5]   [phone] G,Almaz  Friend [5] [phone] About your hospitalization You were admitted on:  2017 You last received care in the:  [facility] [address] You were discharged on:  2017 Unit phone number:  [phone] Why you were hospitalized Your primary diagnosis was:  Not on File Your diagnoses also included:  S/P Rotator Cuff Repair Providers Seen During Your Hospitalizations Provider Role Specialty Primary office phone Senthil Grey MD Attending Provider Orthopedic Surgery [phone] Your Primary Care Physician (PCP) Primary Care Physician Office Phone Office Fax [phone] [facility], [address] [place] [address] [phone] Follow-up Information Follow up With Details Comments Contact Info Enrique Canada MD On 2017 Appointment at [address] [address] 
[phone] Mavis Mars PA-C On 2017 Appointment at [phone] [facility] 
[address] [place] [facility] [address] [phone] Your Appointments 2017 10:00 AM EDT  
POST OP with Mavis Mars PA-C  
[facility] [address] ([facility]) [address] [place] [address] 1 [facility] [address] [phone] 2017  9:30 AM EDT TRANSITIONAL CARE MANAGEMENT with Enrique Canada MD  
 [email or web address] ([facility]) [address] Located within [facility] 83 [address]  
[phone] Current Discharge Medication List  
  
START taking these medications Dose & Instructions Dispensing Information Comments Morning Noon Evening Bedtime  
 naloxegol 25 mg Tab tablet Commonly known as:  Bhavna Chew Your last dose was: Your next dose is:    
   
   
 Dose:  25 mg Take 1 Tab by mouth Daily (before breakfast). Indications: OPIOID-INDUCED CONSTIPATION Quantity:  30 Tab Refills:  0  
     
   
   
   
  
 oxyCODONE IR 5 mg immediate release tablet Commonly known as:  [place] Your last dose was: Your next dose is:    
   
   
 Dose:  5-15 mg Take 1-3 Tabs by mouth every four (4) hours as needed. Max Daily Amount: 90 mg. Indications: Pain Quantity:  44 Tab Refills:  0 CONTINUE these medications which have NOT CHANGED Dose & Instructions Dispensing Information Comments Morning Noon Evening Bedtime ZANTAC 75 75 mg tablet Generic drug:  raNITIdine Your last dose was: Your next dose is:    
   
   
 Dose:  75 mg Take 75 mg by mouth nightly. Indications: HEARTBURN Refills:  0 STOP taking these medications HYDROcodone-acetaminophen 7.5-325 mg per tablet Commonly known as:  Heydi Bradley Where to Get Your Medications Information on where to get these meds will be given to you by the nurse or doctor. ! Ask your nurse or doctor about these medications  
  naloxegol 25 mg Tab tablet  
 oxyCODONE IR 5 mg immediate release tablet Discharge Instructions Rotator Cuff Repair: What to Expect at Home Your Recovery Rotator cuff repair surgery is done to fix a tear in the rotator cuff.  It can also include cleaning the space between the rotator cuff tendons and the shoulder blade. This is called subacromial smoothing. You will feel tired for several days. Your shoulder will be swollen, and you may notice that your skin is a different color near the cut (incision). Your hand and arm may also be swollen. This is normal and will start to get better in a few days. It will be several months before you have complete use of your shoulder and arm. Once you have healed from surgery, you will need to build your strength and the motion of your joint with rehabilitation (rehab) exercises. In time, your shoulder will likely be stronger, less painful, and more flexible than it was before the surgery. This care sheet gives you a general idea about how long it will take for you to recover. But each person recovers at a different pace. Follow the steps below to get better as quickly as possible. How can you care for yourself at home? Activity · Rest when you feel tired. Getting enough sleep will help you recover. Do not lie flat or sleep on your side. Raise your upper body on two or three pillows, or sleep in a reclining chair. · Try to walk each day. Start by walking a little more than you did the day before. Bit by bit, increase the amount you walk. Walking boosts blood flow and helps prevent pneumonia and constipation. · Your arm will be in a sling or other device to prevent it from moving for 4 to 8 weeks. ¨ Always use the sling when you are walking or standing. ¨ If you are sitting or lying down, you can loosen the sling, but do not remove it. This lets your elbow straighten without moving the shoulder. You can also support your arm on a pillow. ¨ Remove the sling only to do prescribed exercises or to shower. · You will not have complete use of your affected arm for 3 to 4 months after surgery.  
¨ You can use your affected arm for writing, eating, or drinking, but move it only at the elbow or wrist. Do not use it for anything else except prescribed exercises until the sling has been removed. ¨ When the sling has been removed, you can do activities that do not involve lifting, pushing, pulling, or carrying. You may not be able to do overhead lifting for 6 to 12 months. · If you have a desk job, you will probably be able to return to work or your normal routine in 1 to 2 weeks. If you have a more active job, you may be away from work for 3 to 4 months or longer. If you work at a job that involves heavy manual labor, lifting your arms above your head, or the use of heavy tools, you may have to think about making changes to your job. · If the rotator cuff repair was done arthroscopically, you can take a shower 48 to 72 hours after surgery. Remove the sling, and leave your arm by your side. To wash under your armpit, lean over and let the arm fall away from your body. Do not raise your arm. You may want to use a shower stool for a day or two. · If you had open surgery, do not shower until you see your doctor and he or she okays it. You can wash the incisions with regular soap and water. · Ask your doctor when you can drive again. This may take about 6 weeks or until you are no longer wearing the sling. Diet · You can eat your normal diet. If your stomach is upset, try bland, low-fat foods like plain rice, broiled chicken, toast, and yogurt. Drink plenty of fluids. · You may notice that your bowel movements are not regular right after your surgery. This is common. Try to avoid constipation and straining with bowel movements. You may want to take a fiber supplement every day. If you have not had a bowel movement after a couple of days, ask your doctor about taking a mild laxative. Medicines · Your doctor will tell you if and when you can restart your medicines. He or she will also give you instructions about taking any new medicines.  
· If you take blood thinners, such as warfarin (Coumadin), clopidogrel (Plavix), or aspirin, be sure to talk to your doctor. He or she will tell you if and when to start taking those medicines again. Make sure that you understand exactly what your doctor wants you to do. · Take pain medicines exactly as directed. ¨ If the doctor gave you a prescription medicine for pain, take it as prescribed. Use pain medicine when you first notice pain, before it becomes severe. It is easier to prevent pain early than to stop it once it gets bad. ¨ If you are not taking a prescription pain medicine, ask your doctor if you can take an over-the-counter medicine. · If your doctor prescribed antibiotics, take them as directed. Do not stop taking them just because you feel better. You need to take the full course of antibiotics. · If you think your pain medicine is making you sick to your stomach: 
¨ Take your medicine after meals (unless your doctor has told you not to). ¨ Ask your doctor for a different pain medicine. Incision care · If you had arthroscopic surgery, you may remove the bandage over your cut (incision) 24 to 48 hours after the surgery. Keep the bandage clean and dry. · If you had open surgery, do not remove your bandage until you see your doctor and he or she okays it. Keep the bandage clean and dry. · If your incision is open to the air, keep the area clean and dry. · If you have strips of tape on the incision, leave the tape on for a week or until it falls off. Exercise · Shoulder rehabilitation is a series of exercises you do after your surgery. This helps you get back your shoulder's range of motion and strength. You will work with your doctor and physical therapist to plan this exercise program. Rehab may not start until 6 weeks after the surgery. To get the best results, you need to do the exercises correctly and as often and for as long as your doctor tells you. Ice · To reduce swelling and pain, put ice or a cold pack on your shoulder for 10 to 20 minutes at a time. Do this every 1 to 2 hours. Put a thin cloth between the ice and your skin. Follow-up care is a key part of your treatment and safety. Be sure to make and go to all appointments, and call your doctor if you are having problems. It's also a good idea to know your test results and keep a list of the medicines you take. When should you call for help? Call 911 anytime you think you may need emergency care. For example, call if: 
· You passed out (lost consciousness). · You have severe trouble breathing. · You have sudden chest pain and shortness of breath, or you cough up blood. Call your doctor now or seek immediate medical care if: 
· You have numbness, tingling, or a bluish color in your fingers or hand. · You have severe nausea or vomiting. · You have pain that does not go away after you take pain medicine. · You have loose stitches, or your incision comes open. · Your incision bleeds through your first bandage or is still bleeding 3 days after your surgery. · You have signs of infection, such as: 
¨ Increased pain, swelling, warmth, or redness. ¨ Red streaks leading from the incision. ¨ Pus draining from the incision. ¨ A fever. Watch closely for any changes in your health, and be sure to contact your doctor if: 
· You do not have a bowel movement after taking a laxative. Where can you learn more? Go to http://sharath-hank.info/. Enter C150 in the search box to learn more about \"Rotator Cuff Repair: What to Expect at Home. \" Current as of: March 21, 2017 Content Version: 11.3 © 7817-8731 Healthwise, Incorporated. Care instructions adapted under license by [employer]! (which disclaims liability or warranty for this information).  If you have questions about a medical condition or this instruction, always ask your healthcare professional. [address] any warranty or liability for your use of this information. 
  
 Patient armband removed and shredded MyChart Activation Thank you for requesting access to [employer]. Please follow the instructions below to securely access and download your online medical record. [employer] allows you to send messages to your doctor, view your test results, renew your prescriptions, schedule appointments, and more. How Do I Sign Up? 1. In your internet browser, go to [email or web address] 
2. Click on the First Time User? Click Here link in the Sign In box. You will be redirect to the New Member Sign Up page. 3. Enter your [employer] Access Code exactly as it appears below. You will not need to use this code after youve completed the sign-up process. If you do not sign up before the expiration date, you must request a new code. [employer] Access Code: [email or web address] Expires: 10/22/2017  3:55 PM (This is the date your [employer] access code will ) 4. Enter the last four digits of your Social Security Number (xxxx) and Date of Birth (mm/dd/yyyy) as indicated and click Submit. You will be taken to the next sign-up page. 5. Create a [employer] ID. This will be your [employer] login ID and cannot be changed, so think of one that is secure and easy to remember. 6. Create a [employer] password. You can change your password at any time. 7. Enter your Password Reset Question and Answer. This can be used at a later time if you forget your password. 8. Enter your e-mail address. You will receive e-mail notification when new information is available in [address] 9. Click Sign Up. You can now view and download portions of your medical record. 10. Click the Download Summary menu link to download a portable copy of your medical information. Additional Information If you have questions, please visit the Frequently Asked Questions section of the [employer] website at [email or web address] Remember, [employer] is NOT to be used for urgent needs. For medical emergencies, dial 911. DISCHARGE SUMMARY from Nurse The following personal items are in your possession at time of discharge: 
 
Dental Appliances: None Visual Aid: None Home Medications: None Jewelry: None Clothing: Shirt, Shorts, Footwear, Undergarments Other Valuables: None PATIENT INSTRUCTIONS: 
 
 
F-face looks uneven A-arms unable to move or move unevenly S-speech slurred or non-existent T-time-call 911 as soon as signs and symptoms begin-DO NOT go Back to bed or wait to see if you get better-TIME IS BRAIN. Warning Signs of HEART ATTACK Call 911 if you have these symptoms: 
? Chest discomfort. Most heart attacks involve discomfort in the center of the chest that lasts more than a few minutes, or that goes away and comes back. It can feel like uncomfortable pressure, squeezing, fullness, or pain. ? Discomfort in other areas of the upper body. Symptoms can include pain or discomfort in one or both arms, the back, neck, jaw, or stomach. ? Shortness of breath with or without chest discomfort. ? Other signs may include breaking out in a cold sweat, nausea, or lightheadedness. Don't wait more than five minutes to call [address]! Fast action can save your life. Calling 911 is almost always the fastest way to get lifesaving treatment. Emergency Medical Services staff can begin treatment when they arrive  up to an hour sooner than if someone gets to the hospital by car. The discharge information has been reviewed with the patient. The patient verbalized understanding.  
 
Discharge medications reviewed with the patient and appropriate educational materials and side effects teaching were provided. Discharge Instructions Attachments/References ROTATOR CUFF: EXERCISES (ENGLISH) ROTATOR CUFF: REHABILITATION (ENGLISH) Discharge Orders None Introducing [facility]! Duong Ku introduces [employer] patient portal. Now you can access parts of your medical record, email your doctor's office, and request medication refills online. 1. In your internet browser, go to [email or web address] 2. Click on the First Time User? Click Here link in the Sign In box. You will see the New Member Sign Up page. 3. Enter your [employer] Access Code exactly as it appears below. You will not need to use this code after youve completed the sign-up process. If you do not sign up before the expiration date, you must request a new code. · [employer] Access Code: [email or web address] Expires: 10/22/2017  3:55 PM 
 
4. Enter the last four digits of your Social Security Number (xxxx) and Date of Birth (mm/dd/yyyy) as indicated and click Submit. You will be taken to the next sign-up page. 5. Create a [employer] ID. This will be your [employer] login ID and cannot be changed, so think of one that is secure and easy to remember. 6. Create a [employer] password. You can change your password at any time. 7. Enter your Password Reset Question and Answer. This can be used at a later time if you forget your password. 8. Enter your e-mail address. You will receive e-mail notification when new information is available in [address] 9. Click Sign Up. You can now view and download portions of your medical record. 10. Click the Download Summary menu link to download a portable copy of your medical information. If you have questions, please visit the Frequently Asked Questions section of the [employer] website. Remember, [employer] is NOT to be used for urgent needs. For medical emergencies, dial 911. Now available from your iPhone and Android! General Information Please provide this summary of care documentation to your next provider. Patient Signature:  ____________________________________________________________ Date:  ____________________________________________________________  
  
Winston Eubanks Provider Signature:  ____________________________________________________________ Date:  ____________________________________________________________ More Information Rotator Cuff: Exercises Your Care Instructions Here are some examples of typical rehabilitation exercises for your condition. Start each exercise slowly. Ease off the exercise if you start to have pain. Your doctor or physical therapist will tell you when you can start these exercises and which ones will work best for you. How to do the exercises Pendulum swing Note: If you have pain in your back, do not do this exercise. 1. Hold on to a table or the back of a chair with your good arm. Then bend forward a little and let your sore arm hang straight down. This exercise does not use the arm muscles. Rather, use your legs and your hips to create movement that makes your arm swing freely. 2. Use the movement from your hips and legs to guide the slightly swinging arm back and forth like a pendulum (or elephant trunk). Then guide it in circles that start small (about the size of a dinner plate). Make the circles a bit larger each day, as your pain allows. 3. Do this exercise for 5 minutes, 5 to 7 times each day. 4. As you have less pain, try bending over a little farther to do this exercise. This will increase the amount of movement at your shoulder. Posterior stretching exercise 1. Hold the elbow of your injured arm with your other hand. 2. Use your hand to pull your injured arm gently up and across your body. You will feel a gentle stretch across the back of your injured shoulder. 3. Hold for at least 15 to 30 seconds. Then slowly lower your arm. 4. Repeat 2 to 4 times. Up-the-back stretch Note: Your doctor or physical therapist may want you to wait to do this stretch until you have regained most of your range of motion and strength. You can do this stretch in different ways. Hold any of these stretches for at least 15 to 30 seconds. Repeat them 2 to 4 times. 1. Put your hand in your back pocket. Let it rest there to stretch your shoulder. 2. With your other hand, hold your injured arm (palm outward) behind your back by the wrist. Pull your arm up gently to stretch your shoulder. 3. Next, put a towel over your other shoulder. Put the hand of your injured arm behind your back. Now hold the back end of the towel. With the other hand, hold the front end of the towel in front of your body. Pull gently on the front end of the towel. This will bring your hand farther up your back to stretch your shoulder. Overhead stretch 1. Standing about an arm's length away, grasp onto a solid surface. You could use a countertop, a doorknob, or the back of a sturdy chair. 2. With your knees slightly bent, bend forward with your arms straight. Lower your upper body, and let your shoulders stretch. 3. As your shoulders are able to stretch farther, you may need to take a step or two backward. 4. Hold for at least 15 to 30 seconds. Then stand up and relax. If you had stepped back during your stretch, step forward so you can keep your hands on the solid surface. 5. Repeat 2 to 4 times. Shoulder flexion (lying down) Note: To make a wand for this exercise, use a piece of PVC pipe or a broom handle with the broom removed. Make the wand about a foot wider than your shoulders. 1. Lie on your back, holding a wand with both hands. Your palms should face down as you hold the wand. 2. Keeping your elbows straight, slowly raise your arms over your head. Raise them until you feel a stretch in your shoulders, upper back, and chest. 
3. Hold for 15 to 30 seconds. 4. Repeat 2 to 4 times. Shoulder rotation (lying down) Note: To make a wand for this exercise, use a piece of PVC pipe or a broom handle with the broom removed. Make the wand about a foot wider than your shoulders. 1. Lie on your back. Hold a wand with both hands with your elbows bent and palms up. 2. Keep your elbows close to your body, and move the wand across your body toward the sore arm. 3. Hold for 8 to 12 seconds. 4. Repeat 2 to 4 times. Wall climbing (to the side) Note: Avoid any movement that is straight to your side, and be careful not to arch your back. Your arm should stay about 30 degrees to the front of your side. 1. Stand with your side to a wall so that your fingers can just touch it at an angle about 30 degrees toward the front of your body. 2. Walk the fingers of your injured arm up the wall as high as pain permits. Try not to shrug your shoulder up toward your ear as you move your arm up. 3. Hold that position for a count of at least 15 to 20. 
4. Walk your fingers back down to the starting position. 5. Repeat at least 2 to 4 times. Try to reach higher each time. Wall climbing (to the front) Note: During this stretching exercise, be careful not to arch your back. 1. Face a wall, and stand so your fingers can just touch it. 2. Keeping your shoulder down, walk the fingers of your injured arm up the wall as high as pain permits. (Don't shrug your shoulder up toward your ear.) 3. Hold your arm in that position for at least 15 to 30 seconds. 4. Slowly walk your fingers back down to where you started. 5. Repeat at least 2 to 4 times. Try to reach higher each time. Shoulder blade squeeze 1. Stand with your arms at your sides, and squeeze your shoulder blades together. Do not raise your shoulders up as you squeeze. 2. Hold 6 seconds. 3. Repeat 8 to 12 times. Scapular exercise: Arm reach 1. Lie flat on your back. This exercise is a very slight motion that starts with your arms raised (elbows straight, arms straight). 2. From this position, reach higher toward the gloria or ceiling. Keep your elbows straight. All motion should be from your shoulder blade only. 3. Relax your arms back to where you started. 4. Repeat 8 to 12 times. Arm raise to the side Note: During this strengthening exercise, your arm should stay about 30 degrees to the front of your side. 1. Slowly raise your injured arm to the side, with your thumb facing up. Raise your arm 60 degrees at the most (shoulder level is 90 degrees). 2. Hold the position for 3 to 5 seconds. Then lower your arm back to your side. If you need to, bring your \"good\" arm across your body and place it under the elbow as you lower your injured arm. Use your good arm to keep your injured arm from dropping down too fast. 
3. Repeat 8 to 12 times. 4. When you first start out, don't hold any extra weight in your hand. As you get stronger, you may use a 1-pound to 2-pound dumbbell or a small can of food. Shoulder flexor and extensor exercise Note: These are isometric exercises. That means you contract your muscles without actually moving. · Push forward (flex): Stand facing a wall or doorjamb, about 6 inches or less back. Hold your injured arm against your body. Make a closed fist with your thumb on top. Then gently push your hand forward into the wall with about 25% to 50% of your strength. Don't let your body move backward as you push. Hold for about 6 seconds. Relax for a few seconds. Repeat 8 to 12 times. · Push backward (extend): Stand with your back flat against a wall. Your upper arm should be against the wall, with your elbow bent 90 degrees (your hand straight ahead). Push your elbow gently back against the wall with about 25% to 50% of your strength.  Don't let your body move forward as you push. Hold for about 6 seconds. Relax for a few seconds. Repeat 8 to 12 times. Scapular exercise: Wall push-ups Note: This exercise is best done with your fingers somewhat turned out, rather than straight up and down. 1. Stand facing a wall, about 12 inches to 18 inches away. 2. Place your hands on the wall at shoulder height. 3. Slowly bend your elbows and bring your face to the wall. Keep your back and hips straight. 4. Push back to where you started. 5. Repeat 8 to 12 times. 6. When you can do this exercise against a wall comfortably, you can try it against a counter. You can then slowly progress to the end of a couch, then to a sturdy chair, and finally to the floor. Scapular exercise: Retraction Note: For this exercise, you will need elastic exercise material, such as surgical tubing or Thera-Band. 1. Put the band around a solid object at about waist level. (A bedpost will work well.) Each hand should hold an end of the band. 2. With your elbows at your sides and bent to 90 degrees, pull the band back. Your shoulder blades should move toward each other. Then move your arms back where you started. 3. Repeat 8 to 12 times. 4. If you have good range of motion in your shoulders, try this exercise with your arms lifted out to the sides. Keep your elbows at a 90-degree angle. Raise the elastic band up to about shoulder level. Pull the band back to move your shoulder blades toward each other. Then move your arms back where you started. Internal rotator strengthening exercise 1. Start by tying a piece of elastic exercise material to a doorknob. You can use surgical tubing or Thera-Band. 2. Stand or sit with your shoulder relaxed and your elbow bent 90 degrees. Your upper arm should rest comfortably against your side. Squeeze a rolled towel between your elbow and your body for comfort. This will help keep your arm at your side. 3. Hold one end of the elastic band in the hand of the painful arm. 4. Slowly rotate your forearm toward your body until it touches your belly. Slowly move it back to where you started. 5. Keep your elbow and upper arm firmly tucked against the towel roll or at your side. 6. Repeat 8 to 12 times. External rotator strengthening exercise 1. Start by tying a piece of elastic exercise material to a doorknob. You can use surgical tubing or Thera-Band. (You may also hold one end of the band in each hand.) 2. Stand or sit with your shoulder relaxed and your elbow bent 90 degrees. Your upper arm should rest comfortably against your side. Squeeze a rolled towel between your elbow and your body for comfort. This will help keep your arm at your side. 3. Hold one end of the elastic band with the hand of the painful arm. 4. Start with your forearm across your belly. Slowly rotate the forearm out away from your body. Keep your elbow and upper arm tucked against the towel roll or the side of your body until you begin to feel tightness in your shoulder. Slowly move your arm back to where you started. 5. Repeat 8 to 12 times. Follow-up care is a key part of your treatment and safety. Be sure to make and go to all appointments, and call your doctor if you are having problems. It's also a good idea to know your test results and keep a list of the medicines you take. Where can you learn more? Go to http://hsarath-hank.info/. Enter Sandro Anne in the search box to learn more about \"Rotator Cuff: Exercises. \" Current as of: March 21, 2017 Content Version: 11.3 © 7338-9153 [employer]. Care instructions adapted under license by [employer] (which disclaims liability or warranty for this information).  If you have questions about a medical condition or this instruction, always ask your healthcare professional. [employer] disclaims any warranty or liability for your use of this information. Rotator Cuff Rehabilitation What is rotator cuff rehabilitation? Rotator cuff rehabilitation is a series of exercises you do after your surgery. It helps you get back your shoulder's range of motion and strength. You will work with your doctor and physical therapist to plan this exercise program. To get the best results, you need to do the exercises correctly and as often as your doctor tells you. Before you start any exercises, talk with your doctor or physical therapist. It is important that you know exactly how to do the exercises. Stop and call your doctor if you are not sure that you are doing the exercises correctly or if you have any pain. Hearing clicks and pops during exercise is not always cause for concern, but a grinding feeling may mean a more serious problem. Ice your shoulder after exercising if it is sore. Follow-up care is a key part of your treatment and safety. Be sure to make and go to all appointments, and call your doctor if you are having problems. It's also a good idea to know your test results and keep a list of the medicines you take. Stretching exercises Do not start doing stretching exercises until your doctor says you can. Your doctor will tell you which exercises to do, and how often and how long to do them. Posterior stretch · Stand upright with your feet shoulder-width apart. · Put the hand of your affected arm on the opposite shoulder, and hold the elbow to your body. · Then, using your good arm, hold the elbow of your affected arm and move it gently up, away from, and across your body. External rotation · Hold a lightweight stick or melo in your good arm. It should be about 2 feet long. A curtain melo may work well. · Lie on your back with your elbows next to your sides. Rest the elbow of your affected arm on a small pillow or folded towel. · Set your arms so that the elbows are bent at a 90-degree angle, like the letter \"L. \" Your hands will point straight up. · Hold the stick with both hands. Use your good arm to push the stick toward the affected arm so the affected arm moves outward, away from your body. Stop when you feel the arm stretching. Strength exercises Do not start strength exercises until your doctor says you can. Usually, this is at least 6 to 8 weeks after surgery. Your doctor will tell you how often and how long to do the exercises. Arm raises to the side · Stand upright with your feet shoulder-width apart and your affected arm at your side. · Slowly raise your injured arm to the side, with your thumb facing up. Raise your arm 60 degrees at the most (shoulder level is 90 degrees). · After holding the position for 3 to 5 seconds, lower your arm back to your side. If you need to, bring your \"good\" arm across your body and place it under the elbow as you lower your injured arm. Use your good arm to keep your injured arm from dropping down too fast during the downward motion. · Repeat 8 to 12 times. · When you first start out, don't hold any additional weight in your hand. As your strength improves, you may use a 1- to 2-pound dumbbell or a small can of food. Shoulder flexor · Stand facing a wall. Your body should be about 6 inches away from the wall. · Keep your affected arm and elbow to your side, and bend your elbow so that your arm is pointing toward the wall. · Make a closed fist with your thumb on top. · Push your hand into the wall and hold it for 6 seconds. Push with 25% to 50% of the force you have. Shoulder extension · Stand with your back flat against a wall. · Keep your affected arm and elbow at your side, and bend your elbow so that your upper arm is against the wall and your lower arm is pointing straight ahead. Make a closed fist with your thumb on top. · Push your elbow gently back against the wall, holding for 6 seconds. Push with 25% to 50% of the force you have. Where can you learn more? Go to http://sharath-hank.info/. Enter U051 in the search box to learn more about \"Rotator Cuff Rehabilitation. \" Current as of: March 21, 2017 Content Version: 11.3 © 1054-1386 [employer]. Care instructions adapted under license by [employer] (which disclaims liability or warranty for this information). If you have questions about a medical condition or this instruction, always ask your healthcare professional. [place] [address] any warranty or liability for your use of this information.

## 2017-08-22 NOTE — INTERVAL H&P NOTE
H&P Update:  Hosea Alvarez was seen and examined. History and physical has been reviewed. The patient has been examined. There have been no significant clinical changes since the completion of the originally dated History and Physical.  Patient identified by surgeon; surgical site was confirmed by patient and surgeon.     Signed By: Mandeep Hylton MD     August 22, 2017 2:23 PM

## 2017-08-22 NOTE — IP AVS SNAPSHOT
[address] Patient: Roxy Mitchell MRN: [id number] :1965 Current Discharge Medication List  
  
START taking these medications Dose & Instructions Dispensing Information Comments Morning Noon Evening Bedtime  
 naloxegol 25 mg Tab tablet Commonly known as:  Tanya Wallace Your last dose was: Your next dose is:    
   
   
 Dose:  25 mg Take 1 Tab by mouth Daily (before breakfast). Indications: OPIOID-INDUCED CONSTIPATION Quantity:  30 Tab Refills:  0  
     
   
   
   
  
 oxyCODONE IR 5 mg immediate release tablet Commonly known as:  Ronaldo Xiao Your last dose was: Your next dose is:    
   
   
 Dose:  5-15 mg Take 1-3 Tabs by mouth every four (4) hours as needed. Max Daily Amount: 90 mg. Indications: Pain Quantity:  44 Tab Refills:  0 CONTINUE these medications which have NOT CHANGED Dose & Instructions Dispensing Information Comments Morning Noon Evening Bedtime ZANTAC 75 75 mg tablet Generic drug:  raNITIdine Your last dose was: Your next dose is:    
   
   
 Dose:  75 mg Take 75 mg by mouth nightly. Indications: HEARTBURN Refills:  0 STOP taking these medications HYDROcodone-acetaminophen 7.5-325 mg per tablet Commonly known as:  Shelly Dumont Where to Get Your Medications Information on where to get these meds will be given to you by the nurse or doctor. ! Ask your nurse or doctor about these medications  
  naloxegol 25 mg Tab tablet  
 oxyCODONE IR 5 mg immediate release tablet

## 2017-08-22 NOTE — H&P (VIEW-ONLY)
Patient: Fabio Homans                MRN: [id number]       SSN: [id number]  YOB: 1965        AGE: 50 y.o. SEX: male    PCP: No primary care provider on file. 08/04/17    Chief Complaint   Patient presents with    Shoulder Pain     Left     HISTORY:  Fabio Homans is a 50 y.o. male who is seen for increased left shoulder and left foot pain. He states that he fell off of an 8 ft. ladder onto a concrete surface on 7/23/17. He was seen at [facility] on 7/24/17. He states severe pain with ROM of his left shoulder. He notes increased shoulder pain at night. Mr Drew Casanova states that the doctor at the [facility] ED was concerned about a crack but x rays were not definitive. He notes foot pain and swelling following the injury. He notes pain over the upper left lateral gastrocnemius area. He reports increased pain when wearing the short fracture walker over his left foot. Pain Assessment  7/28/2017   Location of Pain Leg   Location Modifiers Left   Severity of Pain 5   Quality of Pain Aching; Sharp   Duration of Pain Persistent   Frequency of Pain Intermittent   Aggravating Factors Walking;Standing   Limiting Behavior Yes   Relieving Factors Rest   Result of Injury Yes   Work-Related Injury No   Type of Injury Fall     Occupation, etc:  Mr. 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 He lives in [place] with his wife and mother in law. He has two grown step children living with them as well--25and 23years old. Current weight is 277 pounds. He is 6'2\" tall. He is not hypertensive or diabetic. No results found for: HBA1C, HGBE8, MRF7YFFU, ENI7ZIPB, VLD0PMBC  Weight Metrics 8/4/2017 7/28/2017 7/24/2017   Weight 277 lb 9.6 oz 275 lb 275 lb   BMI 35.64 kg/m2 35.31 kg/m2 35.31 kg/m2       There is no problem list on file for this patient.     REVIEW OF SYSTEMS: All Below are Negative except: See HPI   Constitutional: negative for fever, chills, and weight loss. Cardiovascular: negative for chest pain, claudication, leg swelling, SOB, LAM   Gastrointestinal: Negative for pain, N/V/C/D, Blood in stool or urine, dysuria,  hematuria, incontinence, pelvic pain. Musculoskeletal: See HPI   Neurological: Negative for dizziness and weakness. Negative for headaches, Visual changes, confusion, seizures   Phychiatric/Behavioral: Negative for depression, memory loss, substance  abuse. Extremities: Negative for hair changes, rash, or skin lesion changes. Hematologic: Negative for bleeding problems, bruising, pallor or swollen lymph  nodes   Peripheral Vascular: No calf pain, no circulation deficits. Social History     Social History    Marital status: SINGLE     Spouse name: N/A    Number of children: N/A    Years of education: N/A     Occupational History    Not on file. Social History Main Topics    Smoking status: Never Smoker    Smokeless tobacco: Never Used    Alcohol use Yes      Comment: occasionally    Drug use: No    Sexual activity: Not on file     Other Topics Concern    Not on file     Social History Narrative      No Known Allergies   No current outpatient prescriptions on file. No current facility-administered medications for this visit. PHYSICAL EXAMINATION:  Visit Vitals    /82    Pulse 79    Temp 98.5 °F (36.9 °C) (Oral)    Resp 18    Ht 6' 2\" (1.88 m)    Wt 277 lb 9.6 oz (125.9 kg)    SpO2 98%    BMI 35.64 kg/m2      PHYSICAL EXAM:  Visit Vitals    /82    Pulse 79    Temp 98.5 °F (36.9 °C) (Oral)    Resp 18    Ht 6' 2\" (1.88 m)    Wt 277 lb 9.6 oz (125.9 kg)    SpO2 98%    BMI 35.64 kg/m2       Appearance: Alert, well appearing and pleasant patient who is in no distress, oriented to person, place/time, and who follows commands. HEENT: Dev Ku hears well, does not require hearing aids. His sclera of the eyes are non-icteric.  Dev Ku is breathing normally and no respiratory accessory muscle use is noted. No JVD present and Neck ROM within normal limits. Psychiatric: Affect and mood are appropriate. Oriented x3  Heart[de-identified]  S1, S2 without murmer, regular rhythm  Lungs:  Breath sounds clear to auscultation  Cardiovascular/Peripheral Vascular: Normal pulses to each foot. Integumentary: No rashes,  wounds, or abrasions. Warm and normal color. No drainage.    Gait: slight limp  Sensory Exam: Intact/Normal Sensation    Lymphatic: No evidence of Lymphedema  Vascular:       Pulses: palpable  Varicosities none  Wounds/Abrasion: None Present  Neuro: Negative, no tremors  ORTHO EXAMINATION:  Examination Right shoulder Left shoulder   Skin Intact Intact   Effusion - -   Biceps deformity - -   Atrophy - -   AC joint tenderness - +   Acromial tenderness + +, lateral   Biceps tenderness - -   Forward flexion/Elevation  30   Active abduction  30   External rotation ROM 30 0   Internal rotation ROM 70 70   Apprehension - -   Impingement - -   Drop Arm Test - -   Neurovascular Intact Intact   No defect over the pectoralis major tendon  Pain with any motion left shoulder  Examination Right knee Left knee   Skin Intact Intact   Range of motion 120-0 120-0   Effusion - -   Medial joint line tenderness + +   Lateral joint line tenderness - -   Popliteal tenderness - -   Osteophytes palpable - -   Alicias - -   Patella crepitus - -   Anterior drawer - -   Lateral laxity - -   Medial laxity - -   Varus deformity - -   Valgus deformity - -   Pretibial edema - 2-3+   Calf tenderness - -   Chuck sign negative    Examination Right Ankle/Foot Left Ankle/Foot   Skin Intact Transverse abrasion of distal third medial tibia    Swelling - -   Dorsiflexion 10 5   Plantarflexion 25 15   Deformity - -   Inversion laxity - -   Anterior drawer - -   Medial tenderness - +   Lateral tenderness - +   Heel cord Intact Intact   Sensation Intact Intact   Bunion - -   Toe nails Normal Normal   Capillary refill Normal Normal   Tenderness over calcaneofibular ligament of left ankle   Wearing a strap up ankle brace on his left ankle     MRI LEFT SHOULDER W CONT 8/3/17  IMPRESSION:   1. Complete tear of the supraspinatus tendon with retraction to the [facility] the [facility] joint. Edema within the muscle suggest acuity  2. Near complete tear of the infraspinatus tendon with retraction. 3. No significant loss of rotator cuff muscle bulk  4. Mild subscapularis tendinosis with no tear. 5. Labral degeneration with no detached tear. RADIOGRAPHS:  XR LEFT SHOULDER 7/24/17  IMPRESSION:  Three views - No fractures, mild acromioclavicular narrowing, no glenohumeral narrowing, no calcific densities. No dislocation. XR LEFT FOOT 7/24/17  IMPRESSION:  Three views - No fractures, no bunion deformity, no heel spur. Soft tissue swelling. IMPRESSION:      ICD-10-CM ICD-9-CM    1. Complete tear of left rotator cuff M75.122 727.61    2. Acute pain of left shoulder M25.512 719.41    3. Acromioclavicular sprain, left, subsequent encounter S43. 52XD V58.89      840.0    4. Sprain of calcaneofibular ligament of left ankle, subsequent encounter S93.412D V58.89      845.02    5. Supraspinatus tendon tear, left, initial encounter C85.946P 840.6    6. Infraspinatus tendon tear, left, initial encounter M00.269U 840.3      PLAN:  He will be scheduled for a left rotator cuff repair. Risks of surgery outlined and informed consent obtained. We discussed a possible left foot/ankle MRI in the future if pain continues. He will continue to use an ankle brace for his ankle sprain. He was provided with a note to remain out of work until 1/1/18. He will take Norco for the pain as needed at night.     Scribed by Sandra Lopez ([address]) as dictated by Rohan Barajas MD

## 2017-08-22 NOTE — BRIEF OP NOTE
BRIEF OPERATIVE NOTE    Date of Procedure: 8/22/2017   Preoperative Diagnosis:  ROTATOR CUFF TEAR, IMPINGEMENT SYNDROME, ACROMIOCLAVICULAR ARTHROSIS LEFT SHOULDER  Postoperative Diagnosis: SAME    Procedure(s):  OPEN LEFT SHOULDER DISTAL CLAVICLE RESECTION/ ACROMIOPLASTY WITH ROTATOR CUFF REPAIR/BIOMET JUGGER KNOTS/2 SA'S/NERVE BLOCK  Surgeon(s) and Role:     * Kina Luke MD - Primary         Assistant Staff:       Surgical Staff:  Circ-1: Alice Davey RN  Circ-Relief: Clifton Conway RN  Scrub Tech-1: Nyla Robledo  Event Time In   Incision Start 1817   Incision Close      Anesthesia: General   Estimated Blood Loss: 30 CC  Specimens: * No specimens in log *   Findings: above   Complications: none  Implants:   Implant Name Type Inv.  Item Serial No.  Lot No. LRB No. Used Action   ANCHOR SUT JUGGERKNOT 2.9MM --  - [id number]   ANCHOR SUT JUGGERKNOT 2.9MM --    [employer] [id number] Left 1 Implanted

## 2017-08-22 NOTE — ANESTHESIA PREPROCEDURE EVALUATION
Anesthetic History   No history of anesthetic complications            Review of Systems / Medical History  Patient summary reviewed and pertinent labs reviewed    Pulmonary  Within defined limits                 Neuro/Psych   Within defined limits           Cardiovascular                  Exercise tolerance: <4 METS     GI/Hepatic/Renal  Within defined limits              Endo/Other        Obesity     Other Findings   Comments:   Risk Factors for Postoperative nausea/vomiting:       History of postoperative nausea/vomiting? NO       Female? NO       Motion sickness? NO       Intended opioid administration for postoperative analgesia? YES      Smoking Abstinence  Current Smoker? NO  Elective Surgery? YES  Seen preoperatively by anesthesiologist or proxy prior to day of surgery? YES  Pt abstained from smoking 24 hours prior to anesthesia? N/A           Physical Exam    Airway  Mallampati: III  TM Distance: 4 - 6 cm  Neck ROM: normal range of motion   Mouth opening: Normal     Cardiovascular  Regular rate and rhythm,  S1 and S2 normal,  no murmur, click, rub, or gallop             Dental    Dentition: Poor dentition  Comments: Horrible dentition.   Multiple chipped/cracked/missing teeth   Pulmonary  Breath sounds clear to auscultation               Abdominal  GI exam deferred       Other Findings            Anesthetic Plan    ASA: 2  Anesthesia type: general      Post-op pain plan if not by surgeon: peripheral nerve block single    Induction: Intravenous  Anesthetic plan and risks discussed with: Patient

## 2017-08-23 VITALS
TEMPERATURE: 97.6 F | BODY MASS INDEX: 35.82 KG/M2 | OXYGEN SATURATION: 95 % | HEART RATE: 87 BPM | HEIGHT: 74 IN | DIASTOLIC BLOOD PRESSURE: 93 MMHG | SYSTOLIC BLOOD PRESSURE: 131 MMHG | WEIGHT: 279.13 LBS | RESPIRATION RATE: 18 BRPM

## 2017-08-23 PROCEDURE — 74011250637 HC RX REV CODE- 250/637: Performed by: PHYSICIAN ASSISTANT

## 2017-08-23 PROCEDURE — 74011250637 HC RX REV CODE- 250/637: Performed by: SPECIALIST

## 2017-08-23 PROCEDURE — 74011250636 HC RX REV CODE- 250/636: Performed by: SPECIALIST

## 2017-08-23 PROCEDURE — 74011250636 HC RX REV CODE- 250/636: Performed by: PHYSICIAN ASSISTANT

## 2017-08-23 RX ORDER — OXYCODONE HYDROCHLORIDE 5 MG/1
5-15 TABLET ORAL
Status: DISCONTINUED | OUTPATIENT
Start: 2017-08-23 | End: 2017-08-23 | Stop reason: HOSPADM

## 2017-08-23 RX ORDER — KETOROLAC TROMETHAMINE 30 MG/ML
30 INJECTION, SOLUTION INTRAMUSCULAR; INTRAVENOUS
Status: COMPLETED | OUTPATIENT
Start: 2017-08-23 | End: 2017-08-23

## 2017-08-23 RX ORDER — OXYCODONE HYDROCHLORIDE 5 MG/1
5-15 TABLET ORAL
Qty: 44 TAB | Refills: 0 | Status: SHIPPED | OUTPATIENT
Start: 2017-08-23 | End: 2017-08-25 | Stop reason: SDUPTHER

## 2017-08-23 RX ADMIN — OXYCODONE HYDROCHLORIDE 15 MG: 5 TABLET ORAL at 09:09

## 2017-08-23 RX ADMIN — CEFAZOLIN SODIUM 2 G: 2 SOLUTION INTRAVENOUS at 00:14

## 2017-08-23 RX ADMIN — CEFAZOLIN SODIUM 2 G: 2 SOLUTION INTRAVENOUS at 08:35

## 2017-08-23 RX ADMIN — DOCUSATE SODIUM 100 MG: 100 CAPSULE, LIQUID FILLED ORAL at 08:35

## 2017-08-23 RX ADMIN — OXYCODONE HYDROCHLORIDE 15 MG: 5 TABLET ORAL at 12:42

## 2017-08-23 RX ADMIN — HYDROMORPHONE HYDROCHLORIDE 1 MG: 1 INJECTION, SOLUTION INTRAMUSCULAR; INTRAVENOUS; SUBCUTANEOUS at 05:12

## 2017-08-23 RX ADMIN — KETOROLAC TROMETHAMINE 30 MG: 30 INJECTION INTRAMUSCULAR; INTRAVENOUS at 12:41

## 2017-08-23 RX ADMIN — OXYCODONE HYDROCHLORIDE AND ACETAMINOPHEN 1 TABLET: 5; 325 TABLET ORAL at 02:53

## 2017-08-23 RX ADMIN — Medication 10 ML: at 06:40

## 2017-08-23 RX ADMIN — ACETAMINOPHEN 650 MG: 325 TABLET ORAL at 09:09

## 2017-08-23 RX ADMIN — OXYCODONE HYDROCHLORIDE AND ACETAMINOPHEN 1 TABLET: 5; 325 TABLET ORAL at 06:45

## 2017-08-23 NOTE — ROUTINE PROCESS
TRANSFER - OUT REPORT:    Verbal report given to Jackie on Hosea Alvarez  being transferred to room 552 for routine progression of care       Report consisted of patients Situation, Background, Assessment and   Recommendations(SBAR). Information from the following report(s) SBAR, OR Summary, Intake/Output and MAR was reviewed with the receiving nurse. Opportunity for questions and clarification was provided.       Patient transported with:   2L/NC oxygen  RN

## 2017-08-23 NOTE — PROGRESS NOTES
Care Management Interventions  PCP Verified by CM: Yes  Mode of Transport at Discharge:  (family)  Transition of Care Consult (CM Consult): Discharge Planning  Physical Therapy Consult: No  Occupational Therapy Consult: No  Current Support Network: Other (lives with socorro)  Confirm Follow Up Transport: Family  Plan discussed with Pt/Family/Caregiver: Yes  Discharge Location  Discharge Placement: Home with family assistance    Pt is a 50year old admitted for left shoulder rotator cuff tear, s/p rotator cuff repair. Pt is alert and oriented and alone in room. Pt reports that he lives with his socorro Foster Nohemy [phone] and her mother and her 2 kids. Pt reports that prior to admission he was independent in his ADLs and that he has no DME at home. Pt declines home health. Pt reports that he plans to return home on discharge and that he has transportation home with family.

## 2017-08-23 NOTE — INTERDISCIPLINARY ROUNDS
Dressing changed to the left shoulder. No signs of infection noted. Op site dressing applied. Pt tolerated the procedure well.

## 2017-08-23 NOTE — ROUTINE PROCESS
TRANSFER - IN REPORT:    Verbal report received from  [facility]) on Marcos Ernandez  being received from  PACU(unit) for routine progression of care      Report consisted of patients Situation, Background, Assessment and   Recommendations(SBAR). Information from the following report(s) Kardex was reviewed with the receiving nurse. Opportunity for questions and clarification was provided. Assessment completed upon patients arrival to unit and care assumed.     0502: Pt in stable condition. Pain management continues. Denies nausea / vomiting, chills , SOB, or fever. SCD in place and encouraged to use incentive spirometer. No distress, discomfort signs and symptoms of infection noted. Will continue with current care plan.

## 2017-08-23 NOTE — ANESTHESIA POSTPROCEDURE EVALUATION
Post-Anesthesia Evaluation and Assessment    Patient: Pro Skelton MRN: [id number]  SSN: [id number]    YOB: 1965  Age: 50 y.o. Sex: male       Cardiovascular Function/Vital Signs  Visit Vitals    /79    Pulse 88    Temp 37.1 °C (98.8 °F)    Resp 16    Ht 6' 2\" (1.88 m)    Wt 126.6 kg (279 lb 2 oz)    SpO2 95%    BMI 35.84 kg/m2       Patient is status post general, regional anesthesia for Procedure(s):  OPEN LEFT SHOULDER DISTAL CLAVICLE RESECTION/ ACROMIOPLASTY WITH ROTATOR CUFF REPAIR/BIOMET JUGGER KNOTS/2 SA'S/NERVE BLOCK. Nausea/Vomiting: None    Postoperative hydration reviewed and adequate. Pain:  Pain Scale 1: Numeric (0 - 10) (08/22/17 2106)  Pain Intensity 1: 1 (08/22/17 2106)   Managed    Neurological Status:   Neuro (WDL): Within Defined Limits (08/22/17 2000)   At baseline & nerve block resolving    Mental Status and Level of Consciousness: Alert and oriented     Pulmonary Status:   O2 Device: Oxygen mask (08/22/17 2001)   Adequate oxygenation and airway patent    Complications related to anesthesia: None    Post-anesthesia assessment completed.  No concerns    Signed By: Alin Martinez CRNA     August 22, 2017

## 2017-08-23 NOTE — PROGRESS NOTES
Rotator Cuff Repair: What to Expect at [address] cuff repair surgery is done to fix a tear in the rotator cuff. It can also include cleaning the space between the rotator cuff tendons and the shoulder blade. This is called subacromial smoothing. You will feel tired for several days. Your shoulder will be swollen, and you may notice that your skin is a different color near the cut (incision). Your hand and arm may also be swollen. This is normal and will start to get better in a few days. It will be several months before you have complete use of your shoulder and arm. Once you have healed from surgery, you will need to build your strength and the motion of your joint with rehabilitation (rehab) exercises. In time, your shoulder will likely be stronger, less painful, and more flexible than it was before the surgery. This care sheet gives you a general idea about how long it will take for you to recover. But each person recovers at a different pace. Follow the steps below to get better as quickly as possible. How can you care for yourself at home? Activity  · Rest when you feel tired. Getting enough sleep will help you recover. Do not lie flat or sleep on your side. Raise your upper body on two or three pillows, or sleep in a reclining chair. · Try to walk each day. Start by walking a little more than you did the day before. Bit by bit, increase the amount you walk. Walking boosts blood flow and helps prevent pneumonia and constipation. · Your arm will be in a sling or other device to prevent it from moving for 4 to 8 weeks. ¨ Always use the sling when you are walking or standing. ¨ If you are sitting or lying down, you can loosen the sling, but do not remove it. This lets your elbow straighten without moving the shoulder. You can also support your arm on a pillow. ¨ Remove the sling only to do prescribed exercises or to shower.   · You will not have complete use of your affected arm for 3 to 4 months after surgery. ¨ You can use your affected arm for writing, eating, or drinking, but move it only at the elbow or wrist. Do not use it for anything else except prescribed exercises until the sling has been removed. ¨ When the sling has been removed, you can do activities that do not involve lifting, pushing, pulling, or carrying. You may not be able to do overhead lifting for 6 to 12 months. · If you have a desk job, you will probably be able to return to work or your normal routine in 1 to 2 weeks. If you have a more active job, you may be away from work for 3 to 4 months or longer. If you work at a job that involves heavy manual labor, lifting your arms above your head, or the use of heavy tools, you may have to think about making changes to your job. · If the rotator cuff repair was done arthroscopically, you can take a shower 48 to 72 hours after surgery. Remove the sling, and leave your arm by your side. To wash under your armpit, lean over and let the arm fall away from your body. Do not raise your arm. You may want to use a shower stool for a day or two. · If you had open surgery, do not shower until you see your doctor and he or she okays it. You can wash the incisions with regular soap and water. · Ask your doctor when you can drive again. This may take about 6 weeks or until you are no longer wearing the sling. Diet  · You can eat your normal diet. If your stomach is upset, try bland, low-fat foods like plain rice, broiled chicken, toast, and yogurt. Drink plenty of fluids. · You may notice that your bowel movements are not regular right after your surgery. This is common. Try to avoid constipation and straining with bowel movements. You may want to take a fiber supplement every day. If you have not had a bowel movement after a couple of days, ask your doctor about taking a mild laxative. Medicines  · Your doctor will tell you if and when you can restart your medicines.  He or she will also give you instructions about taking any new medicines. · If you take blood thinners, such as warfarin (Coumadin), clopidogrel (Plavix), or aspirin, be sure to talk to your doctor. He or she will tell you if and when to start taking those medicines again. Make sure that you understand exactly what your doctor wants you to do. · Take pain medicines exactly as directed. ¨ If the doctor gave you a prescription medicine for pain, take it as prescribed. Use pain medicine when you first notice pain, before it becomes severe. It is easier to prevent pain early than to stop it once it gets bad. ¨ If you are not taking a prescription pain medicine, ask your doctor if you can take an over-the-counter medicine. · If your doctor prescribed antibiotics, take them as directed. Do not stop taking them just because you feel better. You need to take the full course of antibiotics. · If you think your pain medicine is making you sick to your stomach:  ¨ Take your medicine after meals (unless your doctor has told you not to). ¨ Ask your doctor for a different pain medicine. Incision care  · If you had arthroscopic surgery, you may remove the bandage over your cut (incision) 24 to 48 hours after the surgery. Keep the bandage clean and dry. · If you had open surgery, do not remove your bandage until you see your doctor and he or she okays it. Keep the bandage clean and dry. · If your incision is open to the air, keep the area clean and dry. · If you have strips of tape on the incision, leave the tape on for a week or until it falls off. Exercise  · Shoulder rehabilitation is a series of exercises you do after your surgery. This helps you get back your shoulder's range of motion and strength. You will work with your doctor and physical therapist to plan this exercise program. Rehab may not start until 6 weeks after the surgery.  To get the best results, you need to do the exercises correctly and as often and for as long as your doctor tells you. Ice  · To reduce swelling and pain, put ice or a cold pack on your shoulder for 10 to 20 minutes at a time. Do this every 1 to 2 hours. Put a thin cloth between the ice and your skin. Follow-up care is a key part of your treatment and safety. Be sure to make and go to all appointments, and call your doctor if you are having problems. It's also a good idea to know your test results and keep a list of the medicines you take. When should you call for help? Call 911 anytime you think you may need emergency care. For example, call if:  · You passed out (lost consciousness). · You have severe trouble breathing. · You have sudden chest pain and shortness of breath, or you cough up blood. Call your doctor now or seek immediate medical care if:  · You have numbness, tingling, or a bluish color in your fingers or hand. · You have severe nausea or vomiting. · You have pain that does not go away after you take pain medicine. · You have loose stitches, or your incision comes open. · Your incision bleeds through your first bandage or is still bleeding 3 days after your surgery. · You have signs of infection, such as:  ¨ Increased pain, swelling, warmth, or redness. ¨ Red streaks leading from the incision. ¨ Pus draining from the incision. ¨ A fever. Watch closely for any changes in your health, and be sure to contact your doctor if:  · You do not have a bowel movement after taking a laxative. Where can you learn more? Go to http://sharath-hank.info/. Enter N285 in the search box to learn more about \"Rotator Cuff Repair: What to Expect at Home. \"  Current as of: March 21, 2017  Content Version: 11.3  © 9547-7330 [employer]. Care instructions adapted under license by [employer] (which disclaims liability or warranty for this information).  If you have questions about a medical condition or this instruction, always ask your healthcare professional. [employer], [facility] disclaims any warranty or liability for your use of this information.     Patient armband removed and shredded  MyChart Activation    Thank you for requesting access to [facility]. Please follow the instructions below to securely access and download your online medical record. [facility] allows you to send messages to your doctor, view your test results, renew your prescriptions, schedule appointments, and more. How Do I Sign Up? 1. In your internet browser, go to [email or web address]  2. Click on the First Time User? Click Here link in the Sign In box. You will be redirect to the New Member Sign Up page. 3. Enter your [facility] Access Code exactly as it appears below. You will not need to use this code after youve completed the sign-up process. If you do not sign up before the expiration date, you must request a new code. [facility] Access Code: [email or web address]  Expires: 10/22/2017  3:55 PM (This is the date your [facility] access code will )    4. Enter the last four digits of your Social Security Number (xxxx) and Date of Birth (mm/dd/yyyy) as indicated and click Submit. You will be taken to the next sign-up page. 5. Create a [facility] ID. This will be your [facility] login ID and cannot be changed, so think of one that is secure and easy to remember. 6. Create a [facility] password. You can change your password at any time. 7. Enter your Password Reset Question and Answer. This can be used at a later time if you forget your password. 8. Enter your e-mail address. You will receive e-mail notification when new information is available in [address] 9. Click Sign Up. You can now view and download portions of your medical record. 10. Click the Download Summary menu link to download a portable copy of your medical information.     Additional Information    If you have questions, please visit the Frequently Asked Questions section of the [facility] website at [email or web address] Remember, MyChart is NOT to be used for urgent needs. For medical emergencies, dial 911. DISCHARGE SUMMARY from Nurse    The following personal items are in your possession at time of discharge:    Dental Appliances: None  Visual Aid: None     Home Medications: None  Jewelry: None  Clothing: Shirt, Shorts, Footwear, Undergarments  Other Valuables: None             PATIENT INSTRUCTIONS:    After general anesthesia or intravenous sedation, for 24 hours or while taking prescription Narcotics:  · Limit your activities  · Do not drive and operate hazardous machinery  · Do not make important personal or business decisions  · Do  not drink alcoholic beverages  · If you have not urinated within 8 hours after discharge, please contact your surgeon on call. Report the following to your surgeon:  · Excessive pain, swelling, redness or odor of or around the surgical area  · Temperature over 100.5  · Nausea and vomiting lasting longer than 4 hours or if unable to take medications  · Any signs of decreased circulation or nerve impairment to extremity: change in color, persistent  numbness, tingling, coldness or increase pain  · Any questions        What to do at Home:  Recommended activity: Activity as tolerated    If you experience any of the following symptoms uncontrolled pain, swelling, redness or discharge from site, fever greater than 100.5, nausea, vomiting, diarrhea, constipation, chest pains, short of breath please follow up with PCP. *  Please give a list of your current medications to your Primary Care Provider. *  Please update this list whenever your medications are discontinued, doses are      changed, or new medications (including over-the-counter products) are added. *  Please carry medication information at all times in case of emergency situations.           These are general instructions for a healthy lifestyle:    No smoking/ No tobacco products/ Avoid exposure to second hand smoke    Surgeon General's Warning:  Quitting smoking now greatly reduces serious risk to your health. Obesity, smoking, and sedentary lifestyle greatly increases your risk for illness    A healthy diet, regular physical exercise & weight monitoring are important for maintaining a healthy lifestyle    You may be retaining fluid if you have a history of heart failure or if you experience any of the following symptoms:  Weight gain of 3 pounds or more overnight or 5 pounds in a week, increased swelling in our hands or feet or shortness of breath while lying flat in bed. Please call your doctor as soon as you notice any of these symptoms; do not wait until your next office visit. Recognize signs and symptoms of STROKE:    F-face looks uneven    A-arms unable to move or move unevenly    S-speech slurred or non-existent    T-time-call 911 as soon as signs and symptoms begin-DO NOT go       Back to bed or wait to see if you get better-TIME IS BRAIN. Warning Signs of HEART ATTACK     Call 911 if you have these symptoms:   Chest discomfort. Most heart attacks involve discomfort in the center of the chest that lasts more than a few minutes, or that goes away and comes back. It can feel like uncomfortable pressure, squeezing, fullness, or pain.  Discomfort in other areas of the upper body. Symptoms can include pain or discomfort in one or both arms, the back, neck, jaw, or stomach.  Shortness of breath with or without chest discomfort.  Other signs may include breaking out in a cold sweat, nausea, or lightheadedness. Don't wait more than five minutes to call [address]! Fast action can save your life. Calling 911 is almost always the fastest way to get lifesaving treatment. Emergency Medical Services staff can begin treatment when they arrive  up to an hour sooner than if someone gets to the hospital by car.        The discharge information has been reviewed with the patient. The patient verbalized understanding. Discharge medications reviewed with the patient and appropriate educational materials and side effects teaching were provided.

## 2017-08-23 NOTE — ROUTINE PROCESS
Bedside and Verbal shift change report given to Brandt Valdes (oncoming nurse) by Flavio Gallagher RN (offgoing nurse). Report included the following information SBAR, Kardex, MAR and Recent Results.     SITUATION:    Code Status: No Order   Reason for Admission: M75.122 LEFT SHOULDER ROTATOR CUFF TEAR; M25.512 LEFT SHOULDER PAIN; S43.52XD ACROMIOCLAVICULAR SPRAIN LEFT SHOULDER   S/P rotator cuff repair    [facility] day: 1   Problem List:       Hospital Problems  Date Reviewed: 8/22/2017          Codes Class Noted POA    S/P rotator cuff repair ICD-10-CM: Z98.890  ICD-9-CM: V45.89  8/22/2017 Unknown              BACKGROUND:    Past Medical History:   Past Medical History:   Diagnosis Date    Torn rotator cuff 08/2017    left rotator cuff tear         Patient taking anticoagulants no     ASSESSMENT:    Changes in Assessment Throughout Shift: No     Patient has Central Line: no Reasons if yes: No   Patient has Dalton Cath: no Reasons if yes: No      Last Vitals:     Vitals:    08/22/17 2116 08/22/17 2136 08/22/17 2339 08/23/17 0511   BP: 124/82 127/79 126/84 124/76   Pulse: 88 98 98 95   Resp: 16 17 16 18   Temp:  99.5 °F (37.5 °C) 98.3 °F (36.8 °C) 97.7 °F (36.5 °C)   SpO2: 94% 93% 94% 93%   Weight:       Height:            IV and DRAINS (will only show if present)   Peripheral IV 08/15/17 Right Hand-Site Assessment: Clean, dry, & intact     WOUND (if present)   Wound Type:  none   Dressing present Dressing Present : No   Wound Concerns/Notes:  none     PAIN    Pain Assessment    Pain Intensity 1: 8 (08/23/17 0512)    Pain Location 1: Shoulder    Pain Intervention(s) 1: Medication (see MAR)    Patient Stated Pain Goal: 0  o Interventions for Pain:  none  o Intervention effective: no  o Time of last intervention: 0700   o Reassessment Completed: no      Last 3 Weights:  Last 3 Recorded Weights in this Encounter    08/22/17 1314   Weight: 126.6 kg (279 lb 2 oz)     Weight change:  INTAKE/OUPUT    Current Shift: 08/22 1901 - 08/23 0700  In: 1000 [I.V.:1000]  Out: 600 [Urine:550]    Last three shifts:       LAB RESULTS   No results for input(s): WBC, HGB, HCT, PLT, HGBEXT, HCTEXT, PLTEXT in the last 72 hours. No results for input(s): NA, K, GLU, BUN, CREA, CA, MG, INR in the last 72 hours. No lab exists for component: PT, PTT, INREXT    RECOMMENDATIONS AND DISCHARGE PLANNING     1. Pending tests/procedures/ Plan of Care or Other Needs: TBD     2. Discharge plan for patient and Needs/Barriers: TBD    3. Estimated Discharge Date: TBD Posted on Whiteboard in Patients Room: no      4. The patient's care plan was reviewed with the oncoming nurse. \"HEALS\" SAFETY CHECK      Fall Risk    Total Score: 2    Safety Measures: Safety Measures: Bed/Chair alarm on, Bed/Chair-Wheels locked, Bed in low position, Call light within reach, Fall prevention (comment), Family at bedside, Side rails X 3    A safety check occurred in the patient's room between off going nurse and oncoming nurse listed above. The safety check included the below items  Area Items   H  High Alert Medications - Verify all high alert medication drips (heparin, PCA, etc.)   E  Equipment - Suction is set up for ALL patients (with yanker)  - Red plugs utilized for all equipment (IV pumps, etc.)  - WOWs wiped down at end of shift.  - Room stocked with oxygen, suction, and other unit-specific supplies   A  Alarms - Bed alarm is set for fall risk patients  - Ensure chair alarm is in place and activated if patient is up in a chair   L  Lines - Check IV for any infiltration  - Dalton bag is empty if patient has a Dalton   - Tubing and IV bags are labeled   S  Safety   - Room is clean, patient is clean, and equipment is clean. - Hallways are clear from equipment besides carts.    - Fall bracelet on for fall risk patients  - Ensure room is clear and free of clutter  - Suction is set up for ALL patients (with yanker)  - Hallways are clear from equipment besides carts.    - Isolation precautions followed, supplies available outside room, sign posted     Latonya Palacios RN

## 2017-08-23 NOTE — PROGRESS NOTES
Rounded on patient. Education sheet reviewed with patient and given to patient. Patient educated about the importance of using the incentive spirometer along with doing foot pumps every 15 minutes while awake up to 10 times per hours during hospital stay and for one week at home. Patient reminded to eat a good diet and drink lots of fluids to prevent complications and promote healing. Reviewed pain medication and bowel medication with patient. Reinforced the need to prevent constipation. Patient encouraged to keep ice on shoulder with a barrier between ice and skin to promote healing and protect skin. Patient encouraged to get OOB and ambulate with assistance to prevent complications and help with recovery. Dressing to shoulder dry and intact. Sling in place to surgical arm. Patient given the opportunity to ask questions. Patient asked if they need anything. Call light in reach.      Orthopedic

## 2017-08-23 NOTE — DISCHARGE INSTRUCTIONS
Rotator Cuff Repair: What to Expect at [address] cuff repair surgery is done to fix a tear in the rotator cuff. It can also include cleaning the space between the rotator cuff tendons and the shoulder blade. This is called subacromial smoothing. You will feel tired for several days. Your shoulder will be swollen, and you may notice that your skin is a different color near the cut (incision). Your hand and arm may also be swollen. This is normal and will start to get better in a few days. It will be several months before you have complete use of your shoulder and arm. Once you have healed from surgery, you will need to build your strength and the motion of your joint with rehabilitation (rehab) exercises. In time, your shoulder will likely be stronger, less painful, and more flexible than it was before the surgery. This care sheet gives you a general idea about how long it will take for you to recover. But each person recovers at a different pace. Follow the steps below to get better as quickly as possible. How can you care for yourself at home? Activity  · Rest when you feel tired. Getting enough sleep will help you recover. Do not lie flat or sleep on your side. Raise your upper body on two or three pillows, or sleep in a reclining chair. · Try to walk each day. Start by walking a little more than you did the day before. Bit by bit, increase the amount you walk. Walking boosts blood flow and helps prevent pneumonia and constipation. · Your arm will be in a sling or other device to prevent it from moving for 4 to 8 weeks. ¨ Always use the sling when you are walking or standing. ¨ If you are sitting or lying down, you can loosen the sling, but do not remove it. This lets your elbow straighten without moving the shoulder. You can also support your arm on a pillow. ¨ Remove the sling only to do prescribed exercises or to shower.   · You will not have complete use of your affected arm for 3 to 4 months after surgery. ¨ You can use your affected arm for writing, eating, or drinking, but move it only at the elbow or wrist. Do not use it for anything else except prescribed exercises until the sling has been removed. ¨ When the sling has been removed, you can do activities that do not involve lifting, pushing, pulling, or carrying. You may not be able to do overhead lifting for 6 to 12 months. · If you have a desk job, you will probably be able to return to work or your normal routine in 1 to 2 weeks. If you have a more active job, you may be away from work for 3 to 4 months or longer. If you work at a job that involves heavy manual labor, lifting your arms above your head, or the use of heavy tools, you may have to think about making changes to your job. · If the rotator cuff repair was done arthroscopically, you can take a shower 48 to 72 hours after surgery. Remove the sling, and leave your arm by your side. To wash under your armpit, lean over and let the arm fall away from your body. Do not raise your arm. You may want to use a shower stool for a day or two. · If you had open surgery, do not shower until you see your doctor and he or she okays it. You can wash the incisions with regular soap and water. · Ask your doctor when you can drive again. This may take about 6 weeks or until you are no longer wearing the sling. Diet  · You can eat your normal diet. If your stomach is upset, try bland, low-fat foods like plain rice, broiled chicken, toast, and yogurt. Drink plenty of fluids. · You may notice that your bowel movements are not regular right after your surgery. This is common. Try to avoid constipation and straining with bowel movements. You may want to take a fiber supplement every day. If you have not had a bowel movement after a couple of days, ask your doctor about taking a mild laxative. Medicines  · Your doctor will tell you if and when you can restart your medicines.  He or she will also give you instructions about taking any new medicines. · If you take blood thinners, such as warfarin (Coumadin), clopidogrel (Plavix), or aspirin, be sure to talk to your doctor. He or she will tell you if and when to start taking those medicines again. Make sure that you understand exactly what your doctor wants you to do. · Take pain medicines exactly as directed. ¨ If the doctor gave you a prescription medicine for pain, take it as prescribed. Use pain medicine when you first notice pain, before it becomes severe. It is easier to prevent pain early than to stop it once it gets bad. ¨ If you are not taking a prescription pain medicine, ask your doctor if you can take an over-the-counter medicine. · If your doctor prescribed antibiotics, take them as directed. Do not stop taking them just because you feel better. You need to take the full course of antibiotics. · If you think your pain medicine is making you sick to your stomach:  ¨ Take your medicine after meals (unless your doctor has told you not to). ¨ Ask your doctor for a different pain medicine. Incision care  · If you had arthroscopic surgery, you may remove the bandage over your cut (incision) 24 to 48 hours after the surgery. Keep the bandage clean and dry. · If you had open surgery, do not remove your bandage until you see your doctor and he or she okays it. Keep the bandage clean and dry. · If your incision is open to the air, keep the area clean and dry. · If you have strips of tape on the incision, leave the tape on for a week or until it falls off. Exercise  · Shoulder rehabilitation is a series of exercises you do after your surgery. This helps you get back your shoulder's range of motion and strength. You will work with your doctor and physical therapist to plan this exercise program. Rehab may not start until 6 weeks after the surgery.  To get the best results, you need to do the exercises correctly and as often and for as long as your doctor tells you. Ice  · To reduce swelling and pain, put ice or a cold pack on your shoulder for 10 to 20 minutes at a time. Do this every 1 to 2 hours. Put a thin cloth between the ice and your skin. Follow-up care is a key part of your treatment and safety. Be sure to make and go to all appointments, and call your doctor if you are having problems. It's also a good idea to know your test results and keep a list of the medicines you take. When should you call for help? Call 911 anytime you think you may need emergency care. For example, call if:  · You passed out (lost consciousness). · You have severe trouble breathing. · You have sudden chest pain and shortness of breath, or you cough up blood. Call your doctor now or seek immediate medical care if:  · You have numbness, tingling, or a bluish color in your fingers or hand. · You have severe nausea or vomiting. · You have pain that does not go away after you take pain medicine. · You have loose stitches, or your incision comes open. · Your incision bleeds through your first bandage or is still bleeding 3 days after your surgery. · You have signs of infection, such as:  ¨ Increased pain, swelling, warmth, or redness. ¨ Red streaks leading from the incision. ¨ Pus draining from the incision. ¨ A fever. Watch closely for any changes in your health, and be sure to contact your doctor if:  · You do not have a bowel movement after taking a laxative. Where can you learn more? Go to http://sharath-hank.info/. Enter A717 in the search box to learn more about \"Rotator Cuff Repair: What to Expect at Home. \"  Current as of: March 21, 2017  Content Version: 11.3  © 8011-1294 [employer]. Care instructions adapted under license by [employer] (which disclaims liability or warranty for this information).  If you have questions about a medical condition or this instruction, always ask your healthcare professional. [employer], [facility] disclaims any warranty or liability for your use of this information.     Patient armband removed and shredded  MyChart Activation    Thank you for requesting access to Wealth Access. Please follow the instructions below to securely access and download your online medical record. Wealth Access allows you to send messages to your doctor, view your test results, renew your prescriptions, schedule appointments, and more. How Do I Sign Up? 1. In your internet browser, go to [email or web address]  2. Click on the First Time User? Click Here link in the Sign In box. You will be redirect to the New Member Sign Up page. 3. Enter your Wealth Access Access Code exactly as it appears below. You will not need to use this code after youve completed the sign-up process. If you do not sign up before the expiration date, you must request a new code. Wealth Access Access Code: [email or web address]  Expires: 10/22/2017  3:55 PM (This is the date your Wealth Access access code will )    4. Enter the last four digits of your Social Security Number (xxxx) and Date of Birth (mm/dd/yyyy) as indicated and click Submit. You will be taken to the next sign-up page. 5. Create a Wealth Access ID. This will be your Wealth Access login ID and cannot be changed, so think of one that is secure and easy to remember. 6. Create a Wealth Access password. You can change your password at any time. 7. Enter your Password Reset Question and Answer. This can be used at a later time if you forget your password. 8. Enter your e-mail address. You will receive e-mail notification when new information is available in [address]. 9. Click Sign Up. You can now view and download portions of your medical record. 10. Click the Download Summary menu link to download a portable copy of your medical information.     Additional Information    If you have questions, please visit the Frequently Asked Questions section of the Wealth Access website at [email or web address] Remember, MyChart is NOT to be used for urgent needs. For medical emergencies, dial 911. DISCHARGE SUMMARY from Nurse    The following personal items are in your possession at time of discharge:    Dental Appliances: None  Visual Aid: None     Home Medications: None  Jewelry: None  Clothing: Shirt, Shorts, Footwear, Undergarments  Other Valuables: None             PATIENT INSTRUCTIONS:    After general anesthesia or intravenous sedation, for 24 hours or while taking prescription Narcotics:  · Limit your activities  · Do not drive and operate hazardous machinery  · Do not make important personal or business decisions  · Do  not drink alcoholic beverages  · If you have not urinated within 8 hours after discharge, please contact your surgeon on call. Report the following to your surgeon:  · Excessive pain, swelling, redness or odor of or around the surgical area  · Temperature over 100.5  · Nausea and vomiting lasting longer than 4 hours or if unable to take medications  · Any signs of decreased circulation or nerve impairment to extremity: change in color, persistent  numbness, tingling, coldness or increase pain  · Any questions        What to do at Home:  Recommended activity: Activity as tolerated    If you experience any of the following symptoms uncontrolled pain, swelling, redness or discharge from site, fever greater than 100.5, nausea, vomiting, diarrhea, constipation, chest pains, short of breath please follow up with PCP. *  Please give a list of your current medications to your Primary Care Provider. *  Please update this list whenever your medications are discontinued, doses are      changed, or new medications (including over-the-counter products) are added. *  Please carry medication information at all times in case of emergency situations.           These are general instructions for a healthy lifestyle:    No smoking/ No tobacco products/ Avoid exposure to second hand smoke    Surgeon General's Warning:  Quitting smoking now greatly reduces serious risk to your health. Obesity, smoking, and sedentary lifestyle greatly increases your risk for illness    A healthy diet, regular physical exercise & weight monitoring are important for maintaining a healthy lifestyle    You may be retaining fluid if you have a history of heart failure or if you experience any of the following symptoms:  Weight gain of 3 pounds or more overnight or 5 pounds in a week, increased swelling in our hands or feet or shortness of breath while lying flat in bed. Please call your doctor as soon as you notice any of these symptoms; do not wait until your next office visit. Recognize signs and symptoms of STROKE:    F-face looks uneven    A-arms unable to move or move unevenly    S-speech slurred or non-existent    T-time-call 911 as soon as signs and symptoms begin-DO NOT go       Back to bed or wait to see if you get better-TIME IS BRAIN. Warning Signs of HEART ATTACK     Call 911 if you have these symptoms:   Chest discomfort. Most heart attacks involve discomfort in the center of the chest that lasts more than a few minutes, or that goes away and comes back. It can feel like uncomfortable pressure, squeezing, fullness, or pain.  Discomfort in other areas of the upper body. Symptoms can include pain or discomfort in one or both arms, the back, neck, jaw, or stomach.  Shortness of breath with or without chest discomfort.  Other signs may include breaking out in a cold sweat, nausea, or lightheadedness. Don't wait more than five minutes to call 911 - MINUTES MATTER! Fast action can save your life. Calling 911 is almost always the fastest way to get lifesaving treatment. Emergency Medical Services staff can begin treatment when they arrive -- up to an hour sooner than if someone gets to the hospital by car.        The discharge information has been reviewed with the patient. The patient verbalized understanding. Discharge medications reviewed with the patient and appropriate educational materials and side effects teaching were provided.

## 2017-08-25 ENCOUNTER — OFFICE VISIT (OUTPATIENT)
Dept: ORTHOPEDIC SURGERY | Facility: CLINIC | Age: 52
End: 2017-08-25

## 2017-08-25 VITALS
DIASTOLIC BLOOD PRESSURE: 83 MMHG | HEART RATE: 96 BPM | SYSTOLIC BLOOD PRESSURE: 134 MMHG | RESPIRATION RATE: 18 BRPM | HEIGHT: 74 IN | OXYGEN SATURATION: 95 % | BODY MASS INDEX: 36.22 KG/M2 | WEIGHT: 282.2 LBS | TEMPERATURE: 98.2 F

## 2017-08-25 DIAGNOSIS — Z98.890 S/P ROTATOR CUFF REPAIR: Primary | ICD-10-CM

## 2017-08-25 RX ORDER — OXYCODONE HYDROCHLORIDE 5 MG/1
5-15 TABLET ORAL
Qty: 44 TAB | Refills: 0 | Status: SHIPPED | OUTPATIENT
Start: 2017-08-25 | End: 2017-11-08 | Stop reason: ALTCHOICE

## 2017-08-25 RX ORDER — DOCUSATE SODIUM 100 MG/1
100 CAPSULE, LIQUID FILLED ORAL 2 TIMES DAILY
COMMUNITY
End: 2017-08-30

## 2017-08-25 NOTE — PROGRESS NOTES
HISTORY OF PRESENT ILLNESS:  Josefina Richter returns. He is three days status post his open left complexed rotator cuff repair. He is doing fair today. He is at home. He has managed his pain successfully with his opioid analgesic. His wife changed the OPSITE dressing this morning. She noticed no appreciable drainage from the previous dressing. He requests a refill of his pain medication. He is wearing his sling as instructed at his discharge. PHYSICAL EXAM:  The left anterior proximal shoulder reveals and intact surgical incision covered with OPSITE honeycomb dressing. There is no evidence of wound dehiscence. Surgical staples are visible. The wound borders are well approximated and visible. The patients left elbow range of motion is 100-10°. He can stand, fully letting his arm drop to his side and easily perform Codman exercises to the left and right. PLAN:  He will continue those Codman exercises each hour while he is awake for the next week. He was refilled of his pain medication. His wife will change his dressing on Monday or sooner should there be any significant bloody strike through. Today, all his questions were answered to his satisfaction. Codman exercise was demonstrated. A copy of his OP report was provided.

## 2017-08-25 NOTE — MR AVS SNAPSHOT
Visit Information Date & Time Provider Department Dept. Phone Encounter #  
 8/25/2017 10:00 AM Win Morris PA-C [facility]  [phone] [id number]  
  
 8/30/2017  9:30 AM  
TRANSITIONAL CARE MANAGEMENT with MD [address] ([address]) Appt Note: HOSPITAL FU- left shoulder repair, [facility], d/c 08/23/17  
 [address]  
  
   
 [address] [facility] Upcoming Health Maintenance Date Due FOBT Q 1 YEAR AGE 50-75 1/8/2017 INFLUENZA AGE 9 TO ADULT 9/15/2017* DTaP/Tdap/Td series (2 - Td) 7/25/2027 *Topic was postponed. The date shown is not the original due date. Allergies as of 8/25/2017  Review Complete On: 8/25/2017 By: Salas Singh No Known Allergies Current Immunizations  Never Reviewed No immunizations on file. Not reviewed this visit Vitals BP Pulse Temp Resp Height(growth percentile) Weight(growth percentile) 134/83 96 98.2 °F (36.8 °C) (Oral) 18 6' 2\" (1.88 m) 282 lb 3.2 oz (128 kg) SpO2 BMI Smoking Status 95% 36.23 kg/m2 Never Smoker BMI and BSA Data Body Mass Index Body Surface Area  
 36.23 kg/m 2 2.59 m 2 Preferred Pharmacy Pharmacy Name Phone [facility] [address] [phone] Your Updated Medication List  
  
   
This list is accurate as of: 8/25/17 10:25 AM.  Always use your most recent med list.  
  
  
  
  
 docusate sodium 100 mg capsule Commonly known as:  Jaja Silk Take 100 mg by mouth two (2) times a day.  
  
 naloxegol 25 mg Tab tablet Commonly known as:  Mihaela Barbone Take 1 Tab by mouth Daily (before breakfast). Indications: OPIOID-INDUCED CONSTIPATION  
  
 oxyCODONE IR 5 mg immediate release tablet Commonly known as:  [place] Allegra Take 1-3 Tabs by mouth every four (4) hours as needed. Max Daily Amount: 90 mg. Indications: Pain ZANTAC 75 75 mg tablet Generic drug:  raNITIdine Take 75 mg by mouth nightly. Indications: HEARTBURN Introducing [facility]! [employer] introduces Rainbow patient portal. Now you can access parts of your medical record, email your doctor's office, and request medication refills online. 1. In your internet browser, go to [email or web address] 2. Click on the First Time User? Click Here link in the Sign In box. You will see the New Member Sign Up page. 3. Enter your Rainbow Access Code exactly as it appears below. You will not need to use this code after youve completed the sign-up process. If you do not sign up before the expiration date, you must request a new code. · Rainbow Access Code: [email or web address] Expires: 10/22/2017  3:55 PM 
 
4. Enter the last four digits of your Social Security Number (xxxx) and Date of Birth (mm/dd/yyyy) as indicated and click Submit. You will be taken to the next sign-up page. 5. Create a Rainbow ID. This will be your Rainbow login ID and cannot be changed, so think of one that is secure and easy to remember. 6. Create a Rainbow password. You can change your password at any time. 7. Enter your Password Reset Question and Answer. This can be used at a later time if you forget your password. 8. Enter your e-mail address. You will receive e-mail notification when new information is available in [address] 9. Click Sign Up. You can now view and download portions of your medical record. 10. Click the Download Summary menu link to download a portable copy of your medical information. If you have questions, please visit the Frequently Asked Questions section of the Rainbow website. Remember, Rainbow is NOT to be used for urgent needs. For medical emergencies, dial 911. Now available from your iPhone and Android! Please provide this summary of care documentation to your next provider. Your primary care clinician is listed as Ubaldo [place]. If you have any questions after today's visit, please call [phone].

## 2017-08-28 NOTE — DISCHARGE SUMMARY
Subjective: 50 y.o., male, 1 Days Post-Op, Procedure(s):  OPEN LEFT SHOULDER DISTAL CLAVICLE RESECTION/ ACROMIOPLASTY WITH ROTATOR CUFF REPAIR/BIOMET JUGGER KNOTS/2 SA'S/NERVE BLOCK     Admitting date:22 Aug 17    Date of Discharge/Transfer: 23 Aug 17    Physical Exam (day of transfer / discharge):23 Aug 17            History:  Past Medical History:   Diagnosis Date    Torn rotator cuff 08/2017    left rotator cuff tear       Allergies:  No Known Allergies      History of Present Illness:     Mr. Dago Bird is a pleasant male who suffered a well documented radiographic history of left shoulder internal derrangment. Dago Bird had failed all conservative measures as directed by Dr. Ailyn Graham, and in light of Dago Bird progressive pain and difficultly performing his  necessary Activities of Daily Living, Dr. Radha Neville recommended an open left rotator cuff repair    Social History     Occupational History    Not on file. Social History Main Topics    Smoking status: Never Smoker    Smokeless tobacco: Never Used      Comment: pt counsled to continue to not smoke.  Alcohol use Yes      Comment: occasionally    Drug use: No    Sexual activity: Not on file       Hospital Course:     Mr. Dago Bird was admitted to [facility] on the morning of 22 Aug 17 under the care of Dr. Ailyn Graham. he was taken to the operating theater under Dr. Nikki Hassan direction, first assisted by trained surgical assistant's where he underwent an complex left rotator cuff repairt. he tolerated the procedure well, and there were no intra operative complications. Post operatively he was transferred medically stable to post op holding.  After all safety criteria had been met during his immediate post op recovery phase he was transferred medical stable to [address] [place] [address] to begin comprehensive physical and occupational therapies, restorative nursing and thrombo embolism (DVT/PE) prophylaxis. Mr. Fabio Homans post operative course progressed slow but directed. The focus throughout the post op period focused on range of motion and pain control. An acute post operative blood loss anemia was noted post operatively and there was  no need to transfuse packed red blood cells. Medically he  remained stable through the remainder of the hospital stay. In light of the slow gains attained by Mr. Fabio Homans post operatively he is being recommended for a directed course of comprehensive PT / OT at home. DISCHARGE PLAN:      The patient will be discharged to home    DIET:  Low Calorie High Protein Diet    NUTRITION: OTC Nutritional supplements, multivitamins      ACTIVITY: No lifting, Driving, or Strenuous exercise and No heavy lifting, pushing, pulling. Weight Bearing/Range of Motion Restrictions: Per Protocol    WOUND / INCISION CARE: Keep wound clean and dry, Reinforce dressing PRN and Ice to area for comfort Keep the current dressings on and in place. There is no need to change these current dressings. Dressings to please be changed by home nurse or nursing home staff each day. Keep all pets away from any wound present in order to prevent infection. PAIN CONTROL: Prescriptions written: On chart    PRECAUTIONS: Weight Bearing/Range of Motion per Restrictions:     VTE PROPHYLAXIS: with ambulation. Sebastian Hose Stockings :Continue use at home. ANTIBIOTICS: None at this time. Physical and Occupational therapies:    will continue with attention to standard postop precautions / restrictions and below:    [ ] Stella Gonzalez [ ] RLE  [XX] LUE  [ ] LLE    [ ] Full WBAT   [ ] Partial WBAT   [ ] Toetouch WB   [XX] Non Weight Bearing: Follow up:    [ ] 1 week  [XX] 10 days  [ ] Specific Date:       Per [facility] Protocol this  case was discussed with attending surgeon and reflects their orders as confirmed by their co signature.      Very Respectfully, Cooper Camacho, APA, APC, MPAS  Surical Physician Assistant-C  Department of [facility]   Contact Cell [phone]

## 2017-08-30 ENCOUNTER — OFFICE VISIT (OUTPATIENT)
Dept: INTERNAL MEDICINE CLINIC | Age: 52
End: 2017-08-30

## 2017-08-30 VITALS
OXYGEN SATURATION: 96 % | WEIGHT: 276.2 LBS | HEIGHT: 74 IN | TEMPERATURE: 97.8 F | DIASTOLIC BLOOD PRESSURE: 80 MMHG | BODY MASS INDEX: 35.45 KG/M2 | RESPIRATION RATE: 16 BRPM | SYSTOLIC BLOOD PRESSURE: 140 MMHG | HEART RATE: 94 BPM

## 2017-08-30 DIAGNOSIS — Z98.890 S/P ROTATOR CUFF SURGERY: Primary | ICD-10-CM

## 2017-08-30 NOTE — PROGRESS NOTES
Chief Complaint   Patient presents with   [facility] Follow Up     [facility] f/u from left shoulder repair       HPI:     Steven Keller is a 50 y.o.  male with history of left rotator cuff tear here for the above complaint. He denies any chest pain, shortness of breath, abdominal pain, headaches or dizziness. Will see orthopedics on Friday. He had left rotator cuff surgery on 8/22/17. He was at [facility] from 8/22/17-8/23/17. Hospital records were reviewed. He denies any bleeding on the left shoulder area. Pain controlled with the roxicodone. Past Medical History:   Diagnosis Date    Torn rotator cuff 08/2017    left rotator cuff tear     Past Surgical History:   Procedure Laterality Date    HAND/FINGER SURGERY UNLISTED      HX KNEE ARTHROSCOPY  1995    right knee    HX OTHER SURGICAL  2000    finger surgery, right 3rd finger. Reattach tendons    HX ROTATOR CUFF REPAIR  08/22/2017    left     Current Outpatient Prescriptions   Medication Sig    oxyCODONE IR (ROXICODONE) 5 mg immediate release tablet Take 1-3 Tabs by mouth every four (4) hours as needed. Max Daily Amount: 90 mg. Indications: Pain    raNITIdine (ZANTAC 75) 75 mg tablet Take 75 mg by mouth nightly. Indications: HEARTBURN     No current facility-administered medications for this visit. Health Maintenance   Topic Date Due    FOBT Q 1 YEAR AGE 50-75  01/08/2017    INFLUENZA AGE 9 TO ADULT  09/15/2017 (Originally 8/1/2017)    DTaP/Tdap/Td series (2 - Td) 07/25/2027       There is no immunization history on file for this patient. No LMP for male patient. Allergies and Intolerances:   No Known Allergies    Family History:   Family History   Problem Relation Age of Onset    Cancer Father      colon ca at age 75 yo    Diabetes Sister        Social History:   He  reports that he has never smoked. He has never used smokeless tobacco.  He  reports that he drinks alcohol.             ·     Objective: Physical exam:   Visit Vitals    /80 (BP 1 Location: Right arm, BP Patient Position: Sitting)    Pulse 94    Temp 97.8 °F (36.6 °C) (Oral)    Resp 16    Ht 6' 2\" (1.88 m)    Wt 276 lb 3.2 oz (125.3 kg)    SpO2 96%    BMI 35.46 kg/m2        Generally: Pleasant male in no acute distress  Cardiac Exam: regular, rate, and rhythm. Normal S1 and S2. No murmurs, gallops, or rubs  Pulmonary exam: Clear to auscultation bilaterally  Abdominal exam: Positive bowel sounds in all four quadrants, soft, nondistended, nontender  Extremities: 2+ dorsalis pedis pulses bilaterally. No pedal edema    bilaterally    LABS/Radiological Tests:  Lab Results   Component Value Date/Time    WBC 8.9 08/14/2017 11:33 AM    HGB 14.4 08/14/2017 11:33 AM    HCT 42.9 08/14/2017 11:33 AM    PLATELET 578 28/11/8635 11:33 AM     Lab Results   Component Value Date/Time    Sodium 140 08/14/2017 11:33 AM    Potassium 4.9 08/14/2017 11:33 AM    Chloride 106 08/14/2017 11:33 AM    CO2 28 08/14/2017 11:33 AM    Glucose 89 08/14/2017 11:33 AM    BUN 16 08/14/2017 11:33 AM    Creatinine 1.25 08/14/2017 11:33 AM     No results found for: CHOL, CHOLX, CHLST, CHOLV, HDL, LDL, LDLC, DLDLP, TGLX, TRIGL, TRIGP  No results found for: GPT    Previous labs      ASSESSMENT/PLAN:    1. S/P rotator cuff surgery: stable. Will be seeing Dr. Bonnie Cortez Friday. He already had first follow-up on 8/25/17. 2. Patient verbalized understanding and agreement with the plan. 3. Patient was given an after-visit summary. 4.   Follow-up Disposition:  Return if symptoms worsen or fail to improve. or sooner if worsening symptoms.                 Cindy Zamora MD

## 2017-08-30 NOTE — PROGRESS NOTES
ROOM # 1    Charly Rojas presents today for   Chief Complaint   Patient presents with   [facility] Follow Up     [facility] f/u from left shoulder repair       [place] preferred language for health care discussion is english/other. Is someone accompanying this pt? Yes, wife    Is the patient using any DME equipment during [address]? no    Depression Screening:  PHQ over the last two weeks 8/30/2017 8/15/2017   Little interest or pleasure in doing things Not at all Not at all   Feeling down, depressed or hopeless Not at all Not at all   Total Score PHQ 2 0 0       Learning Assessment:  Learning Assessment 8/15/2017   PRIMARY LEARNER Patient   HIGHEST LEVEL OF EDUCATION - PRIMARY LEARNER  DID NOT GRADUATE HIGH SCHOOL   BARRIERS PRIMARY LEARNER NONE   CO-LEARNER CAREGIVER No   PRIMARY LANGUAGE ENGLISH   LEARNER PREFERENCE PRIMARY VIDEOS     PICTURES     DEMONSTRATION   ANSWERED BY patient   RELATIONSHIP SELF       Abuse Screening:  No flowsheet data found. Fall Risk  No flowsheet data found. Health Maintenance reviewed and discussed per provider. Yes    Charly Rojas is due for FOBT. Please order/place referral if appropriate. Advance Directive:  1. Do you have an advance directive in place? Patient Reply: no    2. If not, would you like material regarding how to put one in place? Patient Reply: no    Coordination of Care:  1. Have you been to the ER, urgent care clinic since your last visit? Hospitalized since your last visit? [facility] for left shoulder rotator cuff repair    2. Have you seen or consulted any other health care providers outside of the [address] [place] [address] since your last visit? Include any pap smears or colon screening.  no

## 2017-08-30 NOTE — MR AVS SNAPSHOT
Visit Information Date & Time Provider Department Dept. Phone Encounter #  
 8/30/2017  9:30 AM Christiane Arguello MD [facility] [phone] [id number] Follow-up Instructions Return if symptoms worsen or fail to improve. Your Appointments 9/1/2017  2:30 PM  
Follow Up with Phoenix Esteban PA-C  
[facility] - [address]) Appt Note: 1WK FU LT SHOULDER  
 [address] [facility], [address] [place] [address]  
[phone]  
  
   
 [address] [facility], [address] [place] [id number] Upcoming Health Maintenance Date Due FOBT Q 1 YEAR AGE 50-75 1/8/2017 INFLUENZA AGE 9 TO ADULT 9/15/2017* DTaP/Tdap/Td series (2 - Td) 7/25/2027 *Topic was postponed. The date shown is not the original due date. Allergies as of 8/30/2017  Review Complete On: 8/30/2017 By: Christiane Arguello MD  
 No Known Allergies Current Immunizations  Never Reviewed No immunizations on file. Not reviewed this visit Vitals BP Pulse Temp Resp Height(growth percentile) Weight(growth percentile) (!) 130/95 (BP 1 Location: Right arm, BP Patient Position: Sitting) 94 97.8 °F (36.6 °C) (Oral) 16 6' 2\" (1.88 m) 276 lb 3.2 oz (125.3 kg) SpO2 BMI Smoking Status 96% 35.46 kg/m2 Never Smoker Vitals History BMI and BSA Data Body Mass Index Body Surface Area  
 35.46 kg/m 2 2.56 m 2 Preferred Pharmacy Pharmacy Name Phone [facility], [address] [phone] Your Updated Medication List  
  
   
This list is accurate as of: 8/30/17  9:52 AM.  Always use your most recent med list.  
  
  
  
  
 oxyCODONE IR 5 mg immediate release tablet Commonly known as:  Korinadon Mariann Take 1-3 Tabs by mouth every four (4) hours as needed. Max Daily Amount: 90 mg. Indications: Pain ZANTAC 75 75 mg tablet Generic drug:  raNITIdine Take 75 mg by mouth nightly. Indications: HEARTBURN Follow-up Instructions Return if symptoms worsen or fail to improve. Introducing [facility] & HEALTH SERVICES! [facility] introduces InfoNow patient portal. Now you can access parts of your medical record, email your doctor's office, and request medication refills online. 1. In your internet browser, go to [email or web address]. [employer]ell [email or web address]/D-[email or web address] Thermoset 2. Click on the First Time User? Click Here link in the Sign In box. You will see the New Member Sign Up page. 3. Enter your InfoNow Access Code exactly as it appears below. You will not need to use this code after youve completed the sign-up process. If you do not sign up before the expiration date, you must request a new code. · InfoNow Access Code: [email or web address] Expires: 10/22/2017  3:55 PM 
 
4. Enter the last four digits of your Social Security Number (xxxx) and Date of Birth (mm/dd/yyyy) as indicated and click Submit. You will be taken to the next sign-up page. 5. Create a InfoNow ID. This will be your InfoNow login ID and cannot be changed, so think of one that is secure and easy to remember. 6. Create a InfoNow password. You can change your password at any time. 7. Enter your Password Reset Question and Answer. This can be used at a later time if you forget your password. 8. Enter your e-mail address. You will receive e-mail notification when new information is available in [address]. 9. Click Sign Up. You can now view and download portions of your medical record. 10. Click the Download Summary menu link to download a portable copy of your medical information. If you have questions, please visit the Frequently Asked Questions section of the InfoNow website. Remember, InfoNow is NOT to be used for urgent needs. For medical emergencies, dial 911. Now available from your iPhone and Android! Please provide this summary of care documentation to your next provider. Your primary care clinician is listed as Ubaldo [place]. If you have any questions after today's visit, please call [phone].

## 2017-09-01 ENCOUNTER — OFFICE VISIT (OUTPATIENT)
Dept: ORTHOPEDIC SURGERY | Facility: CLINIC | Age: 52
End: 2017-09-01

## 2017-09-01 VITALS
OXYGEN SATURATION: 97 % | TEMPERATURE: 97.7 F | RESPIRATION RATE: 18 BRPM | BODY MASS INDEX: 35.27 KG/M2 | HEIGHT: 74 IN | WEIGHT: 274.8 LBS | HEART RATE: 87 BPM | SYSTOLIC BLOOD PRESSURE: 136 MMHG | DIASTOLIC BLOOD PRESSURE: 89 MMHG

## 2017-09-01 DIAGNOSIS — Z48.02 VISIT FOR SUTURE REMOVAL: ICD-10-CM

## 2017-09-01 DIAGNOSIS — Z98.890 S/P ROTATOR CUFF REPAIR: Primary | ICD-10-CM

## 2017-09-01 NOTE — PROGRESS NOTES
HISTORY OF PRESENT ILLNESS:  Randi Hart returns with his wife 10 days status post his left shoulder open rotator cuff repair. His OP site dressing is intact and well-secured. He has showered without any troubles. His pain is well managed to the left shoulder. He is performing his Codman exercises. He is ready for staple removal and outpatient physical therapy services. PROCEDURE:  Using clean technique, all staples are removed from the left shoulder today. Steri-Strips were placed with a sterile top cover. PLAN:   The patient is going to followup in about three weeks. He has a complaint to follow with Dr. Katlyn Otero regarding left foot, ankle, and posterior left lower leg pain. He will make an appointment to do that secondary to his recoveries from his rotator cuff repair.

## 2017-09-01 NOTE — MR AVS SNAPSHOT
Visit Information Date & Time Provider Department Dept. Phone Encounter #  
 9/1/2017  2:30 PM Lanette Giordanogladis, [address] [facility] [phone] Upcoming Health Maintenance Date Due FOBT Q 1 YEAR AGE 50-75 1/8/2017 INFLUENZA AGE 9 TO ADULT 9/15/2017* DTaP/Tdap/Td series (2 - Td) 7/25/2027 *Topic was postponed. The date shown is not the original due date. Allergies as of 9/1/2017  Review Complete On: 9/1/2017 By: Marilynn Reed No Known Allergies Current Immunizations  Never Reviewed No immunizations on file. Not reviewed this visit You Were Diagnosed With   
  
 Codes Comments S/P rotator cuff repair    -  Primary ICD-10-CM: Z25.937 ICD-9-CM: V45.89 Visit for suture removal     ICD-10-CM: Z48.02 
ICD-9-CM: V58.32 Vitals BP Pulse Temp Resp Height(growth percentile) Weight(growth percentile) 136/89 87 97.7 °F (36.5 °C) (Oral) 18 6' 2\" (1.88 m) 274 lb 12.8 oz (124.6 kg) SpO2 BMI Smoking Status 97% 35.28 kg/m2 Never Smoker BMI and BSA Data Body Mass Index Body Surface Area  
 35.28 kg/m 2 2.55 m 2 Preferred Pharmacy Pharmacy Name Phone [facility], [address] [phone] Your Updated Medication List  
  
   
This list is accurate as of: 9/1/17  2:36 PM.  Always use your most recent med list.  
  
  
  
  
 oxyCODONE IR 5 mg immediate release tablet Commonly known as:  Carrero Seats Take 1-3 Tabs by mouth every four (4) hours as needed. Max Daily Amount: 90 mg. Indications: Pain ZANTAC 75 75 mg tablet Generic drug:  raNITIdine Take 75 mg by mouth nightly. Indications: HEARTBURN We Performed the Following REFERRAL TO PHYSICAL THERAPY [WAQ26 Custom] Comments:  
 Open Left RCR protocol Referral Information Referral ID Referred By Referred To  
  
 [id number] [facility]   
   [address] [facility]   
   [address] [place]io Delaney, [address] [place] [address],[address] Phone: [phone] Fax: [phone] Visits Status Start Date End Date 1 New Request 9/1/17 9/1/18 If your referral has a status of pending review or denied, additional information will be sent to support the outcome of this decision. Introducing [facility] & HEALTH SERVICES! [facility]ij[facility] Wild introduces Applitools patient portal. Now you can access parts of your medical record, email your doctor's office, and request medication refills online. 1. In your internet browser, go to [email or web address]. Watchful Software/Renovate America 2. Click on the First Time User? Click Here link in the Sign In box. You will see the New Member Sign Up page. 3. Enter your Applitools Access Code exactly as it appears below. You will not need to use this code after youve completed the sign-up process. If you do not sign up before the expiration date, you must request a new code. · Applitools Access Code: [email or web address] Expires: 10/22/2017  3:55 PM 
 
4. Enter the last four digits of your Social Security Number (xxxx) and Date of Birth (mm/dd/yyyy) as indicated and click Submit. You will be taken to the next sign-up page. 5. Create a Applitools ID. This will be your Applitools login ID and cannot be changed, so think of one that is secure and easy to remember. 6. Create a Applitools password. You can change your password at any time. 7. Enter your Password Reset Question and Answer. This can be used at a later time if you forget your password. 8. Enter your e-mail address. You will receive e-mail notification when new information is available in [address] 9. Click Sign Up. You can now view and download portions of your medical record. 10. Click the Download Summary menu link to download a portable copy of your medical information. If you have questions, please visit the Frequently Asked Questions section of the Longevity [employer] website. Remember, Nuve is NOT to be used for urgent needs. For medical emergencies, dial 911. Now available from your iPhone and Android! Please provide this summary of care documentation to your next provider. Your primary care clinician is listed as Ubaldo [place]. If you have any questions after today's visit, please call [phone].

## 2017-09-11 ENCOUNTER — OFFICE VISIT (OUTPATIENT)
Dept: ORTHOPEDIC SURGERY | Facility: CLINIC | Age: 52
End: 2017-09-11

## 2017-09-11 VITALS
RESPIRATION RATE: 18 BRPM | HEART RATE: 78 BPM | WEIGHT: 277.8 LBS | SYSTOLIC BLOOD PRESSURE: 133 MMHG | BODY MASS INDEX: 35.65 KG/M2 | DIASTOLIC BLOOD PRESSURE: 83 MMHG | TEMPERATURE: 97.2 F | OXYGEN SATURATION: 96 % | HEIGHT: 74 IN

## 2017-09-11 DIAGNOSIS — M54.16 LUMBAR RADICULOPATHY: ICD-10-CM

## 2017-09-11 DIAGNOSIS — M79.672 LEFT FOOT PAIN: ICD-10-CM

## 2017-09-11 DIAGNOSIS — S86.812D STRAIN OF CALF MUSCLE, LEFT, SUBSEQUENT ENCOUNTER: ICD-10-CM

## 2017-09-11 DIAGNOSIS — M79.605 PAIN OF LEFT LOWER EXTREMITY: ICD-10-CM

## 2017-09-11 DIAGNOSIS — S93.412D SPRAIN OF CALCANEOFIBULAR LIGAMENT OF LEFT ANKLE, SUBSEQUENT ENCOUNTER: ICD-10-CM

## 2017-09-11 DIAGNOSIS — G57.62 MORTON'S NEUROMA OF LEFT FOOT: Primary | ICD-10-CM

## 2017-09-11 DIAGNOSIS — M77.42 METATARSALGIA, LEFT FOOT: ICD-10-CM

## 2017-09-11 RX ORDER — CHOLECALCIFEROL (VITAMIN D3) 125 MCG
CAPSULE ORAL
COMMUNITY
End: 2018-02-19 | Stop reason: ALTCHOICE

## 2017-09-11 RX ORDER — BETAMETHASONE SODIUM PHOSPHATE AND BETAMETHASONE ACETATE 3; 3 MG/ML; MG/ML
3 INJECTION, SUSPENSION INTRA-ARTICULAR; INTRALESIONAL; INTRAMUSCULAR; SOFT TISSUE ONCE
Qty: 0.5 ML | Refills: 0
Start: 2017-09-11 | End: 2017-09-11

## 2017-09-11 RX ORDER — BUPIVACAINE HYDROCHLORIDE 2.5 MG/ML
0.5 INJECTION, SOLUTION EPIDURAL; INFILTRATION; INTRACAUDAL ONCE
Qty: 0.5 ML | Refills: 0
Start: 2017-09-11 | End: 2017-09-11

## 2017-09-11 NOTE — PATIENT INSTRUCTIONS
Ponce's Neuroma: Care Instructions  Your Care Instructions  When your toes are squeezed together, often over a period of months or even years, the nerve that runs between the toes can swell and get thicker. This is called a Ponce's neuroma. It may feel like a small lump is pushing inside the ball of your foot. When you walk or move your toes, you feel pain that sometimes moves into your toes. If the pressure continues, it may damage the nerve. If you catch the problem early and change your shoes, the nerve swelling may go away. Your doctor may advise you to wear wide-toed shoes. He or she also may suggest that you ice the sore spot and limit activities that put pressure on the nerve. If these steps do not help your symptoms, your doctor may have you use special pads or devices that spread the toes. This keeps them from squeezing the nerve. In some cases, you may get a cortisone shot to reduce swelling and pain. If these treatments don't help, your doctor may suggest surgery to relieve pressure or remove the swollen nerve. Follow-up care is a key part of your treatment and safety. Be sure to make and go to all appointments, and call your doctor if you are having problems. It's also a good idea to know your test results and keep a list of the medicines you take. How can you care for yourself at home? · Ask your doctor if you can take an over-the-counter pain medicine, such as acetaminophen (Tylenol), ibuprofen (Advil, Motrin), or naproxen (Aleve). Be safe with medicines. Read and follow all instructions on the label. · Try to stay off your feet as much as possible until the pain and swelling go away. · Avoid wearing tight, pointy, or high-heeled shoes. Instead, wear roomy footwear. · Put ice or a cold pack on the area for 10 to 20 minutes at a time. Put a thin cloth between the ice and your skin. · Try massaging your feet. This relaxes the muscles around the nerve.   · If your doctor prescribed special pads or a device to relieve pressure on your toes, use these items as directed. · Until all pain and swelling go away, avoid activities that put pressure on the toes. These include racquet sports and running. When should you call for help? Watch closely for changes in your health, and be sure to contact your doctor if:  · You do not get better as expected. Where can you learn more? Go to http://sharath-hank.info/. Enter E100 in the search box to learn more about \"Ponce's Neuroma: Care Instructions. \"  Current as of: March 21, 2017  Content Version: 11.3  © 2369-6305 [employer]. Care instructions adapted under license by [employer] (which disclaims liability or warranty for this information). If you have questions about a medical condition or this instruction, always ask your healthcare professional. [address] any warranty or liability for your use of this information. Joint Injections: Care Instructions  Your Care Instructions  Joint injections are shots into a joint, such as the knee. They may be used to put in medicines, such as pain relievers. Or they can be used to take out fluid. Sometimes the fluid is tested in a lab. This can help find the cause of a joint problem. A corticosteroid, or steroid, shot is used to reduce inflammation in tendons or joints. It is often used to treat problems such as arthritis, tendinitis, and bursitis. Steroids can be injected directly into a painful, inflamed joint. They can also help reduce inflammation of a bursa. A bursa is a sac of fluid. It cushions and lubricates areas where tendons, ligaments, skin, muscles, or bones rub against each other. A steroid shot can sometimes help with short-term pain relief when other treatments haven't worked. If steroid shots help, pain may improve for weeks or months. Follow-up care is a key part of your treatment and safety.  Be sure to make and go to all appointments, and call your doctor if you are having problems. It's also a good idea to know your test results and keep a list of the medicines you take. How can you care for yourself at home? · Put ice or a cold pack on the area for 10 to 20 minutes at a time. Put a thin cloth between the ice and your skin. · Take anti-inflammatory medicines to reduce pain, swelling, or inflammation. These include ibuprofen (Advil, Motrin) and naproxen (Aleve). Read and follow all instructions on the label. · Avoid strenuous activities for several days, especially those that put stress on the area where you got the shot. · If you have dressings over the area, keep them clean and dry. You may remove them when your doctor tells you to. When should you call for help? Call your doctor now or seek immediate medical care if:  · You have signs of infection, such as:  ¨ Increased pain, swelling, warmth, or redness. ¨ Red streaks leading from the site. ¨ Pus draining from the site. ¨ A fever. Watch closely for changes in your health, and be sure to contact your doctor if you have any problems. Where can you learn more? Go to http://sharath-hank.info/. Enter N616 in the search box to learn more about \"Joint Injections: Care Instructions. \"  Current as of: March 21, 2017  Content Version: 11.3  © 8320-8621 [employer]. Care instructions adapted under license by [employer] (which disclaims liability or warranty for this information). If you have questions about a medical condition or this instruction, always ask your healthcare professional. [place] [address] any warranty or liability for your use of this information.

## 2017-09-11 NOTE — MR AVS SNAPSHOT
Visit Information Date & Time Provider Department Dept. Phone Encounter #  
 9/11/2017  9:55 AM Mandeep Hylton MD [facility]  Follow-up Instructions Return if symptoms worsen or fail to improve. Your Appointments 9/29/2017 10:30 AM  
Follow Up with Wei De Luna PA-C  
[facility] [address] [facility]) Appt Note: FU  LT SHOULDER  
 [address] [facility], [address] [place] [address]  
[phone]  
  
   
 [address] [place] [address], [address] [place] [address] Upcoming Health Maintenance Date Due FOBT Q 1 YEAR AGE 50-75 1/8/2017 INFLUENZA AGE 9 TO ADULT 9/15/2017* DTaP/Tdap/Td series (2 - Td) 7/25/2027 *Topic was postponed. The date shown is not the original due date. Allergies as of 9/11/2017  Review Complete On: 9/11/2017 By: Jasmeet Early No Known Allergies Current Immunizations  Never Reviewed No immunizations on file. Not reviewed this visit You Were Diagnosed With   
  
 Codes Comments Ponce's neuroma of left foot    -  Primary ICD-10-CM: G57.62 
ICD-9-CM: 355.6 Pain of left lower extremity     ICD-10-CM: M79.605 ICD-9-CM: 729.5 Left foot pain     ICD-10-CM: Y66.696 ICD-9-CM: 729.5 Sprain of calcaneofibular ligament of left ankle, subsequent encounter     ICD-10-CM: W27.776D ICD-9-CM: V58.89, 845.02 Strain of calf muscle, left, subsequent encounter     ICD-10-CM: S86.812D ICD-9-CM: V58.89, 844.8 Metatarsalgia, left foot     ICD-10-CM: M77.42 
ICD-9-CM: 726.70 Lumbar radiculopathy     ICD-10-CM: M54.16 
ICD-9-CM: 724.4 Vitals BP Pulse Temp Resp Height(growth percentile) Weight(growth percentile) 133/83 78 97.2 °F (36.2 °C) (Oral) 18 6' 2\" (1.88 m) 277 lb 12.8 oz (126 kg) SpO2 BMI Smoking Status 96% 35.67 kg/m2 Never Smoker BMI and BSA Data Body Mass Index Body Surface Area  
 35.67 kg/m 2 2.56 m 2 Preferred Pharmacy Pharmacy Name Phone [facility], [place] [address] [phone] Your Updated Medication List  
  
   
This list is accurate as of: 9/11/17 10:42 AM.  Always use your most recent med list.  
  
  
  
  
 ALEVE 220 mg Cap Generic drug:  naproxen sodium Take  by mouth. oxyCODONE IR 5 mg immediate release tablet Commonly known as:  Lexi Hendricks Take 1-3 Tabs by mouth every four (4) hours as needed. Max Daily Amount: 90 mg. Indications: Pain ZANTAC 75 75 mg tablet Generic drug:  raNITIdine Take 75 mg by mouth nightly. Indications: HEARTBURN We Performed the Following AMB POC XRAY, FOOT; COMPLETE, 3+ VIEW [16913 CPT(R)] AMB POC XRAY; TIBIA & FIBULA, TWO VIE [50824 CPT(R)] Follow-up Instructions Return if symptoms worsen or fail to improve. Patient Instructions Ponce's Neuroma: Care Instructions Your Care Instructions When your toes are squeezed together, often over a period of months or even years, the nerve that runs between the toes can swell and get thicker. This is called a Ponce's neuroma. It may feel like a small lump is pushing inside the ball of your foot. When you walk or move your toes, you feel pain that sometimes moves into your toes. If the pressure continues, it may damage the nerve. If you catch the problem early and change your shoes, the nerve swelling may go away. Your doctor may advise you to wear wide-toed shoes. He or she also may suggest that you ice the sore spot and limit activities that put pressure on the nerve. If these steps do not help your symptoms, your doctor may have you use special pads or devices that spread the toes. This keeps them from squeezing the nerve.  In some cases, you may get a cortisone shot to reduce swelling and pain. If these treatments don't help, your doctor may suggest surgery to relieve pressure or remove the swollen nerve. Follow-up care is a key part of your treatment and safety. Be sure to make and go to all appointments, and call your doctor if you are having problems. It's also a good idea to know your test results and keep a list of the medicines you take. How can you care for yourself at home? · Ask your doctor if you can take an over-the-counter pain medicine, such as acetaminophen (Tylenol), ibuprofen (Advil, Motrin), or naproxen (Aleve). Be safe with medicines. Read and follow all instructions on the label. · Try to stay off your feet as much as possible until the pain and swelling go away. · Avoid wearing tight, pointy, or high-heeled shoes. Instead, wear roomy footwear. · Put ice or a cold pack on the area for 10 to 20 minutes at a time. Put a thin cloth between the ice and your skin. · Try massaging your feet. This relaxes the muscles around the nerve. · If your doctor prescribed special pads or a device to relieve pressure on your toes, use these items as directed. · Until all pain and swelling go away, avoid activities that put pressure on the toes. These include racquet sports and running. When should you call for help? Watch closely for changes in your health, and be sure to contact your doctor if: 
· You do not get better as expected. Where can you learn more? Go to http://sharath-hank.info/. Enter E100 in the search box to learn more about \"Ponce's Neuroma: Care Instructions. \" Current as of: March 21, 2017 Content Version: 11.3 © 8927-6605 [employer]. Care instructions adapted under license by [employer] (which disclaims liability or warranty for this information).  If you have questions about a medical condition or this instruction, always ask your healthcare professional. [facility] disclaims any warranty or liability for your use of this information. Joint Injections: Care Instructions Your Care Instructions Joint injections are shots into a joint, such as the knee. They may be used to put in medicines, such as pain relievers. Or they can be used to take out fluid. Sometimes the fluid is tested in a lab. This can help find the cause of a joint problem. A corticosteroid, or steroid, shot is used to reduce inflammation in tendons or joints. It is often used to treat problems such as arthritis, tendinitis, and bursitis. Steroids can be injected directly into a painful, inflamed joint. They can also help reduce inflammation of a bursa. A bursa is a sac of fluid. It cushions and lubricates areas where tendons, ligaments, skin, muscles, or bones rub against each other. A steroid shot can sometimes help with short-term pain relief when other treatments haven't worked. If steroid shots help, pain may improve for weeks or months. Follow-up care is a key part of your treatment and safety. Be sure to make and go to all appointments, and call your doctor if you are having problems. It's also a good idea to know your test results and keep a list of the medicines you take. How can you care for yourself at home? · Put ice or a cold pack on the area for 10 to 20 minutes at a time. Put a thin cloth between the ice and your skin. · Take anti-inflammatory medicines to reduce pain, swelling, or inflammation. These include ibuprofen (Advil, Motrin) and naproxen (Aleve). Read and follow all instructions on the label. · Avoid strenuous activities for several days, especially those that put stress on the area where you got the shot. · If you have dressings over the area, keep them clean and dry. You may remove them when your doctor tells you to. When should you call for help? Call your doctor now or seek immediate medical care if: 
· You have signs of infection, such as: ¨ Increased pain, swelling, warmth, or redness. ¨ Red streaks leading from the site. ¨ Pus draining from the site. ¨ A fever. Watch closely for changes in your health, and be sure to contact your doctor if you have any problems. Where can you learn more? Go to http://sharath-hank.info/. Enter N616 in the search box to learn more about \"Joint Injections: Care Instructions. \" Current as of: March 21, 2017 Content Version: 11.3 © 6268-8454 [employer]. Care instructions adapted under license by [employer] (which disclaims liability or warranty for this information). If you have questions about a medical condition or this instruction, always ask your healthcare professional. [address] any warranty or liability for your use of this information. Introducing [facility] & HEALTH SERVICES! Kenroy Comer introduces [employer] patient portal. Now you can access parts of your medical record, email your doctor's office, and request medication refills online. 1. In your internet browser, go to [email or web address] 2. Click on the First Time User? Click Here link in the Sign In box. You will see the New Member Sign Up page. 3. Enter your [employer] Access Code exactly as it appears below. You will not need to use this code after youve completed the sign-up process. If you do not sign up before the expiration date, you must request a new code. · [employer] Access Code: [email or web address] Expires: 10/22/2017  3:55 PM 
 
4. Enter the last four digits of your Social Security Number (xxxx) and Date of Birth (mm/dd/yyyy) as indicated and click Submit. You will be taken to the next sign-up page. 5. Create a [employer] ID. This will be your [employer] login ID and cannot be changed, so think of one that is secure and easy to remember. 6. Create a Sundrop [employer]t password. You can change your password at any time. 7. Enter your Password Reset Question and Answer. This can be used at a later time if you forget your password. 8. Enter your e-mail address. You will receive e-mail notification when new information is available in [address] 9. Click Sign Up. You can now view and download portions of your medical record. 10. Click the Download Summary menu link to download a portable copy of your medical information. If you have questions, please visit the Frequently Asked Questions section of the [employer] website. Remember, [employer] is NOT to be used for urgent needs. For medical emergencies, dial 911. Now available from your iPhone and Android! Please provide this summary of care documentation to your next provider. Your primary care clinician is listed as Ubaldo [place]. If you have any questions after today's visit, please call [phone].

## 2017-09-14 ENCOUNTER — TELEPHONE (OUTPATIENT)
Dept: ORTHOPEDIC SURGERY | Facility: CLINIC | Age: 52
End: 2017-09-14

## 2017-09-14 NOTE — TELEPHONE ENCOUNTER
[facility]inal[facility] Burrows [facility] inquiring about the protocol/specific outline  that Dr. Richardson Adams would like the physical therapist to follow related to the recovery of the open left shoulder rotator cuff repair surgery the patient had on 8/22/17. Please call Reinaldo Burrows at [phone] or Fax the protocol  to [phone]. She also requested the op note from the surgery as well as the notes from the post-op visits and I already faxed those.

## 2017-09-15 ENCOUNTER — TELEPHONE (OUTPATIENT)
Dept: ORTHOPEDIC SURGERY | Facility: CLINIC | Age: 52
End: 2017-09-15

## 2017-09-15 NOTE — TELEPHONE ENCOUNTER
Juan Carlos Rodriguez from [facility]) is calling wanting to speak with someone about the visit 09-11-17 patient had with Dr. Genny Hopkins. She has the appt notes. Mireille can be reached at [phone].

## 2017-09-18 NOTE — TELEPHONE ENCOUNTER
JULIANNE DE LA O WOULD LIKE TO HAVE THE REFERRALS FOR THE BACK & FOOT SENT TO HER SO SHE CAN GET THE PATIENT SET UP    PLEASE FAX# [phone]

## 2017-09-25 ENCOUNTER — OFFICE VISIT (OUTPATIENT)
Dept: ORTHOPEDIC SURGERY | Age: 52
End: 2017-09-25

## 2017-09-25 VITALS
DIASTOLIC BLOOD PRESSURE: 82 MMHG | HEART RATE: 81 BPM | SYSTOLIC BLOOD PRESSURE: 142 MMHG | BODY MASS INDEX: 35.29 KG/M2 | WEIGHT: 275 LBS | HEIGHT: 74 IN | OXYGEN SATURATION: 97 % | TEMPERATURE: 98.2 F

## 2017-09-25 DIAGNOSIS — M72.2 PLANTAR FASCIITIS OF LEFT FOOT: ICD-10-CM

## 2017-09-25 DIAGNOSIS — G57.62 MORTON'S NEUROMA OF LEFT FOOT: Primary | ICD-10-CM

## 2017-09-25 DIAGNOSIS — M79.672 LEFT FOOT PAIN: ICD-10-CM

## 2017-09-25 NOTE — MR AVS SNAPSHOT
Visit Information Date & Time Provider Department Dept. Phone Encounter #  
 9/25/2017  9:45 AM Selene Lou, [address] [facility] [phone] [id number] Follow-up Instructions Return in about 2 weeks (around 10/9/2017) for follow up evaluation, Scan review. Your Appointments 9/29/2017 10:30 AM  
Follow Up with Maru Haynes PA-C  
[facility]) Appt Note: FU  LT SHOULDER  
 [address] [facility], [address] [facility]  
[phone] [address] [facility] [address] [address]  
  
    
 10/16/2017  1:00 PM  
New Patient with Shannon Cuadra MD  
[facility]) Appt Note: LUMBAR RADICULOPATHY/ WORK COMP/REF BY DR CHE/ *ADVISED PT TO COME EARLY W. PHOTO ID & INS. CARD, CURRENT MEDICATION LIST & DOSAGE TO THE [facility] LOCATION  
 [address] [facility] [id number] [phone]  
  
   
 [address] [facility] [address] [facility] [address] Upcoming Health Maintenance Date Due FOBT Q 1 YEAR AGE 50-75 1/8/2017 INFLUENZA AGE 9 TO ADULT 8/1/2017 DTaP/Tdap/Td series (2 - Td) 7/25/2027 Allergies as of 9/25/2017  Review Complete On: 9/25/2017 By: Selene Lou PA-C No Known Allergies Current Immunizations  Never Reviewed No immunizations on file. Not reviewed this visit You Were Diagnosed With   
  
 Codes Comments Ponce's neuroma of left foot    -  Primary ICD-10-CM: G57.62 
ICD-9-CM: 355.6 Left foot pain     ICD-10-CM: S64.863 ICD-9-CM: 729.5 Vitals BP Pulse Temp Height(growth percentile) Weight(growth percentile) SpO2  
 142/82 81 98.2 °F (36.8 °C) (Oral) 6' 2\" (1.88 m) 275 lb (124.7 kg) 97% BMI Smoking Status 35.31 kg/m2 Never Smoker BMI and BSA Data Body Mass Index Body Surface Area 35.31 kg/m 2 2.55 m 2 Preferred Pharmacy Pharmacy Name Phone [facility], [address] [phone] Your Updated Medication List  
  
   
This list is accurate as of: 9/25/17 10:10 AM.  Always use your most recent med list.  
  
  
  
  
 ALEVE 220 mg Cap Generic drug:  naproxen sodium Take  by mouth. oxyCODONE IR 5 mg immediate release tablet Commonly known as:  Quita Tom Take 1-3 Tabs by mouth every four (4) hours as needed. Max Daily Amount: 90 mg. Indications: Pain ZANTAC 75 75 mg tablet Generic drug:  raNITIdine Take 75 mg by mouth nightly. Indications: HEARTBURN Follow-up Instructions Return in about 2 weeks (around 10/9/2017) for follow up evaluation, Scan review. Patient Instructions MRI of the left forefoot has been ordered to evaluate for Ponce's Neuroma Please follow up in the office after this study as been completed or sooner as needed Ponce's Neuroma: Care Instructions Your Care Instructions When your toes are squeezed together, often over a period of months or even years, the nerve that runs between the toes can swell and get thicker. This is called a Ponce's neuroma. It may feel like a small lump is pushing inside the ball of your foot. When you walk or move your toes, you feel pain that sometimes moves into your toes. If the pressure continues, it may damage the nerve. If you catch the problem early and change your shoes, the nerve swelling may go away. Your doctor may advise you to wear wide-toed shoes. He or she also may suggest that you ice the sore spot and limit activities that put pressure on the nerve. If these steps do not help your symptoms, your doctor may have you use special pads or devices that spread the toes. This keeps them from squeezing the nerve.  In some cases, you may get a cortisone shot to reduce swelling and pain. If these treatments don't help, your doctor may suggest surgery to relieve pressure or remove the swollen nerve. Follow-up care is a key part of your treatment and safety. Be sure to make and go to all appointments, and call your doctor if you are having problems. It's also a good idea to know your test results and keep a list of the medicines you take. How can you care for yourself at home? · Ask your doctor if you can take an over-the-counter pain medicine, such as acetaminophen (Tylenol), ibuprofen (Advil, Motrin), or naproxen (Aleve). Be safe with medicines. Read and follow all instructions on the label. · Try to stay off your feet as much as possible until the pain and swelling go away. · Avoid wearing tight, pointy, or high-heeled shoes. Instead, wear roomy footwear. · Put ice or a cold pack on the area for 10 to 20 minutes at a time. Put a thin cloth between the ice and your skin. · Try massaging your feet. This relaxes the muscles around the nerve. · If your doctor prescribed special pads or a device to relieve pressure on your toes, use these items as directed. · Until all pain and swelling go away, avoid activities that put pressure on the toes. These include racquet sports and running. When should you call for help? Watch closely for changes in your health, and be sure to contact your doctor if: 
· You do not get better as expected. Where can you learn more? Go to http://sharath-hank.info/. Enter E100 in the search box to learn more about \"Ponce's Neuroma: Care Instructions. \" Current as of: March 21, 2017 Content Version: 11.3 © 5585-1223 [employer]. Care instructions adapted under license by [employer] (which disclaims liability or warranty for this information).  If you have questions about a medical condition or this instruction, always ask your healthcare professional. [employer] disclaims any warranty or liability for your use of this information. Introducing [facility] & HEALTH SERVICES! Susana Sowmya introduces [employer] patient portal. Now you can access parts of your medical record, email your doctor's office, and request medication refills online. 1. In your internet browser, go to [email or web address] 2. Click on the First Time User? Click Here link in the Sign In box. You will see the New Member Sign Up page. 3. Enter your [employer] Access Code exactly as it appears below. You will not need to use this code after youve completed the sign-up process. If you do not sign up before the expiration date, you must request a new code. · [employer] Access Code: [email or web address] Expires: 10/22/2017  3:55 PM 
 
4. Enter the last four digits of your Social Security Number (xxxx) and Date of Birth (mm/dd/yyyy) as indicated and click Submit. You will be taken to the next sign-up page. 5. Create a [employer] ID. This will be your [employer] login ID and cannot be changed, so think of one that is secure and easy to remember. 6. Create a [employer] password. You can change your password at any time. 7. Enter your Password Reset Question and Answer. This can be used at a later time if you forget your password. 8. Enter your e-mail address. You will receive e-mail notification when new information is available in [address] 9. Click Sign Up. You can now view and download portions of your medical record. 10. Click the Download Summary menu link to download a portable copy of your medical information. If you have questions, please visit the Frequently Asked Questions section of the [employer] website. Remember, [employer] is NOT to be used for urgent needs. For medical emergencies, dial 911. Now available from your iPhone and Android! Please provide this summary of care documentation to your next provider. Your primary care clinician is listed as Ubaldo [place]. If you have any questions after today's visit, please call [phone].

## 2017-09-25 NOTE — PATIENT INSTRUCTIONS
MRI of the left forefoot has been ordered to evaluate for Ponce's Neuroma  Please follow up in the office after this study as been completed or sooner as needed     Ponce's Neuroma: Care Instructions  Your Care Instructions  When your toes are squeezed together, often over a period of months or even years, the nerve that runs between the toes can swell and get thicker. This is called a Ponce's neuroma. It may feel like a small lump is pushing inside the ball of your foot. When you walk or move your toes, you feel pain that sometimes moves into your toes. If the pressure continues, it may damage the nerve. If you catch the problem early and change your shoes, the nerve swelling may go away. Your doctor may advise you to wear wide-toed shoes. He or she also may suggest that you ice the sore spot and limit activities that put pressure on the nerve. If these steps do not help your symptoms, your doctor may have you use special pads or devices that spread the toes. This keeps them from squeezing the nerve. In some cases, you may get a cortisone shot to reduce swelling and pain. If these treatments don't help, your doctor may suggest surgery to relieve pressure or remove the swollen nerve. Follow-up care is a key part of your treatment and safety. Be sure to make and go to all appointments, and call your doctor if you are having problems. It's also a good idea to know your test results and keep a list of the medicines you take. How can you care for yourself at home? · Ask your doctor if you can take an over-the-counter pain medicine, such as acetaminophen (Tylenol), ibuprofen (Advil, Motrin), or naproxen (Aleve). Be safe with medicines. Read and follow all instructions on the label. · Try to stay off your feet as much as possible until the pain and swelling go away. · Avoid wearing tight, pointy, or high-heeled shoes. Instead, wear roomy footwear.   · Put ice or a cold pack on the area for 10 to 20 minutes at a time. Put a thin cloth between the ice and your skin. · Try massaging your feet. This relaxes the muscles around the nerve. · If your doctor prescribed special pads or a device to relieve pressure on your toes, use these items as directed. · Until all pain and swelling go away, avoid activities that put pressure on the toes. These include racquet sports and running. When should you call for help? Watch closely for changes in your health, and be sure to contact your doctor if:  · You do not get better as expected. Where can you learn more? Go to http://sharath-hank.info/. Enter E100 in the search box to learn more about \"Ponce's Neuroma: Care Instructions. \"  Current as of: March 21, 2017  Content Version: 11.3  © 1080-4021 [employer]. Care instructions adapted under license by [employer] (which disclaims liability or warranty for this information). If you have questions about a medical condition or this instruction, always ask your healthcare professional. [address] any warranty or liability for your use of this information.

## 2017-09-25 NOTE — PROGRESS NOTES
Patient: Jackeline Mckeon                MRN: [id number]       SSN: [id number]  YOB: 1965                        AGE: 50 y.o. SEX: male    PCP:Ubaldo Romero MD    DOI: July 2017 - fell off a ladder    Chief Complaint:   Chief Complaint   Patient presents with    Foot Pain     LEFT         HPI AND PHYSICAL EXAM:     Jackeline Mckeon is a 50 y.o. 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 Patient was last seen in the office on 9/11/17 by Dr. John Espinosa and received an injection to the left foot webspace at that time. Patient states that the injection only lasted a few hours then the pain came back worse. He reports pain radiating into his left second and third toes that gets worse as he walks. He also reports pain in the left hindfoot. He notes pain with standing and walking and he has to take off his shoes to relieve the pain so he wears slide on shoes most of the time. Patient reports that his injury occurred after he fell off a ladder in July 2017. He has been followed by Dr. John Espinosa and has a follow up with Jordon Ritchie PA-C on Friday. Visit Vitals    /82    Pulse 81    Temp 98.2 °F (36.8 °C) (Oral)    Ht 6' 2\" (1.88 m)    Wt 275 lb (124.7 kg)    SpO2 97%    BMI 35.31 kg/m2         Pain Scale: 2/10    GEN:  Alert, well developed, well nourished, well appearing 50 y.o. male in no acute distress. PSYCH:  Normal affect, mood, and conduct. alert, oriented x 3 alert, oriented x 3, no dementia  M/S EXAMINATION OF: left foot and ankle  SKIN: mild edema, no erythema, no ecchymosis, no warmth  TENDERNESS:  mild tenderness to palpation to plantar fascia (medial and lateral) and tenderness to 1st and 2nd webspace  NEUROVASCULAR:  grossly intact. Positive distal pulses and capillary refill. DVT ASSESSMENT:  The calf is not tender to palpation.  No evidence of DVT seen on physical exam.  ROM:  Within normal limits       IMPRESSION: 1. Ponce's neuroma of left foot    2. Left foot pain    3. Plantar fasciitis of left foot          PLAN:         Orders Placed This Encounter    MRI FOOT LT WO CONT    MRI ANKLE LT WO CONT               MRI of the left forefoot has been ordered to evaluate for Ponce's Neuroma  Please follow up in the office after this study as been completed or sooner as needed             Patient has been discussed with Dr. Annabel Lee during this visit and he agrees with the assessment and plan. Patient understands treatment plan and has been provided with patient education. PAST MEDICAL HISTORY:     Past Medical History:   Diagnosis Date    Torn rotator cuff 08/2017    left rotator cuff tear       MEDICATIONS:     Current Outpatient Prescriptions   Medication Sig    naproxen sodium (ALEVE) 220 mg cap Take  by mouth.  raNITIdine (ZANTAC 75) 75 mg tablet Take 75 mg by mouth nightly. Indications: HEARTBURN    oxyCODONE IR (ROXICODONE) 5 mg immediate release tablet Take 1-3 Tabs by mouth every four (4) hours as needed. Max Daily Amount: 90 mg. Indications: Pain     No current facility-administered medications for this visit. ALLERGIES:     No Known Allergies      PAST SURGICAL HISTORY:     Past Surgical History:   Procedure Laterality Date    HAND/FINGER SURGERY UNLISTED      HX KNEE ARTHROSCOPY  1995    right knee    HX OTHER SURGICAL  2000    finger surgery, right 3rd finger. Reattach tendons    HX ROTATOR CUFF REPAIR  08/22/2017    left       SOCIAL HISTORY:     Social History     Social History    Marital status: SINGLE     Spouse name: N/A    Number of children: N/A    Years of education: N/A     Occupational History    Not on file. Social History Main Topics    Smoking status: Never Smoker    Smokeless tobacco: Never Used      Comment: pt counsled to continue to not smoke.      Alcohol use Yes      Comment: occasionally    Drug use: No    Sexual activity: Not on file     Other Topics Concern    Not on file     Social History Narrative       FAMILY HISTORY:     Family History   Problem Relation Age of Onset    Cancer Father      colon ca at age 77 yo    Diabetes Sister          REVIEW OF SYSTEMS:     Otherwise as noted in HPI      RADIOGRAPHS & DIAGNOSTIC STUDIES     No results found for any visits on 09/25/17.         Addie Craig PA-C  9/25/2017

## 2017-09-29 ENCOUNTER — TELEPHONE (OUTPATIENT)
Dept: ORTHOPEDIC SURGERY | Age: 52
End: 2017-09-29

## 2017-09-29 NOTE — TELEPHONE ENCOUNTER
Laura at [address] [facility] needs clarification on an order for MRI of left foot and ankle. Order is for no contrast, but comments state \"with Gadolinium\".  Please contact her before 9 am on Monday as the patient is scheduled for 12:30 pm.

## 2017-10-04 ENCOUNTER — OFFICE VISIT (OUTPATIENT)
Dept: ORTHOPEDIC SURGERY | Age: 52
End: 2017-10-04

## 2017-10-04 VITALS
HEIGHT: 74 IN | WEIGHT: 280 LBS | DIASTOLIC BLOOD PRESSURE: 94 MMHG | BODY MASS INDEX: 35.94 KG/M2 | SYSTOLIC BLOOD PRESSURE: 132 MMHG | HEART RATE: 78 BPM

## 2017-10-04 DIAGNOSIS — M77.42 METATARSALGIA OF LEFT FOOT: Primary | ICD-10-CM

## 2017-10-04 DIAGNOSIS — M25.80 SESAMOIDITIS: ICD-10-CM

## 2017-10-04 NOTE — MR AVS SNAPSHOT
Visit Information Date & Time Provider Department Dept. Phone Encounter #  
 10/4/2017 10:10 AM Luis Miguel Tila, [address] [facility] [id number] Your Appointments 10/6/2017 11:00 AM  
Follow Up with Buddy Buerger, PA-C  
[facility] [address]) Appt Note: FU  LT SHOULDER; mri 10-02-07 [facility]  
 [address] [facility], [address] Margarita Merchant  
[phone]  
  
   
 [address] [facility], [address] [address]  
  
    
 10/16/2017  1:00 PM  
New Patient with Darren Johnson MD  
[facility] [address] [facility]) Appt Note: LUMBAR RADICULOPATHY/ WORK COMP/REF BY DR CHE/ *ADVISED PT TO COME EARLY W. PHOTO ID & INS. CARD, CURRENT MEDICATION LIST & DOSAGE TO THE [facility] LOCATION  
 [address] [facility] [id number] [phone]  
  
   
 [address] [facility] [address] Upcoming Health Maintenance Date Due FOBT Q 1 YEAR AGE 50-75 1/8/2017 INFLUENZA AGE 9 TO ADULT 8/1/2017 DTaP/Tdap/Td series (2 - Td) 7/25/2027 Allergies as of 10/4/2017  Review Complete On: 10/4/2017 By: Teresita Myers No Known Allergies Current Immunizations  Never Reviewed No immunizations on file. Not reviewed this visit You Were Diagnosed With   
  
 Codes Comments Metatarsalgia of left foot    -  Primary ICD-10-CM: M77.42 
ICD-9-CM: 726.70 Sesamoiditis     ICD-10-CM: M25.80 ICD-9-CM: 733.99 Vitals BP Pulse Height(growth percentile) Weight(growth percentile) BMI Smoking Status (!) 132/94 78 6' 2\" (1.88 m) 280 lb (127 kg) 35.95 kg/m2 Never Smoker Vitals History BMI and BSA Data Body Mass Index Body Surface Area 35.95 kg/m 2 2.58 m 2 Preferred Pharmacy Pharmacy Name Phone  [facility] [place] [address] [facility] [phone] Your Updated Medication List  
  
   
This list is accurate as of: 10/4/17 11:35 AM.  Always use your most recent med list.  
  
  
  
  
 ALEVE 220 mg Cap Generic drug:  naproxen sodium Take  by mouth. oxyCODONE IR 5 mg immediate release tablet Commonly known as:  [employer] Take 1-3 Tabs by mouth every four (4) hours as needed. Max Daily Amount: 90 mg. Indications: Pain ZANTAC 75 75 mg tablet Generic drug:  raNITIdine Take 75 mg by mouth nightly. Indications: HEARTBURN Patient Instructions Please follow up in 4 weeks. You are advised to contact us if your condition worsens. The provider has ordered a custom brace/orthotic for you. This will be customized and made for you by an outside facility. Please contact the orthotist at one of the below offices for your custom fitting and follow up in the office with the doctor or his physicians assistant 3 weeks after you have been wearing the brace. Jesu Murguia at [facility]:  
 
[address] [place] [address] Phone: [phone] Or  
 
Louie Balderrama [address] [place] [address] Phone: [phone] Foot Sprain (Metatarsophalangeal Joint): Rehab Exercises Your Care Instructions Here are some examples of typical rehabilitation exercises for your condition. Start each exercise slowly. Ease off the exercise if you start to have pain. Your doctor or physical therapist will tell you when you can start these exercises and which ones will work best for you. How to do the exercises Great toe extension stretch 1. Sit in a chair, and put your affected foot across your other knee. 2. Grasp your heel with one hand, and then slowly pull your big toe back with your other hand. Pull your toe back toward your ankle until you feel a stretch along the bottom of your foot. 3. Hold the stretch for at least 15 to 30 seconds. 4. Repeat 2 to 4 times. Great toe flexion stretch 1. Sit in a chair, and put your affected foot across your other knee. 2. Grasp your heel with one hand, and then slowly push your big toe down with your other hand. Push your toe down and away from your ankle until you feel a stretch along the top of your foot. 3. Hold the stretch for at least 15 to 30 seconds. 4. Repeat 2 to 4 times. Great toe traction stretch 1. Sit in a chair, and put your affected foot across your other knee. 2. Grasp your heel and the middle part of your foot with one hand. With your other hand, use your thumb and middle finger to grasp your big toe and gently pull it straight out and away from your foot. 3. Hold the stretch for at least 15 to 30 seconds. 4. Repeat 2 to 4 times. Forefoot stretch 1. Sit in a chair with your affected leg bent, and put the heel of your affected foot on the edge of the seat. Or you can sit on the floor with your leg bent and your heel digging into the floor. 2. Grasp your foot with both hands. Your thumbs will be on the top side of your foot just below the joints where your toes connect to your foot. Your other fingers will be underneath your foot. 3. Use the fingers underneath your foot to push up on the two toes that are closest to your big toe. Then use your thumbs and hands to spread your foot and toes outward until you feel a stretch in your foot. The outer edges of your foot will curve downward as you push up on the middle toes. Hold the stretch for at least 15 to 30 seconds. 4. Next, slowly press your thumbs down on the two toes that are closest to your big toe. The outer edges of your foot will curve upward. Hold the stretch for at least 15 to 30 seconds until you feel a stretch in your foot. 5. Repeat 2 to 4 times. Towel scrunches 1. Sit in a chair, and place both feet on a towel on the floor. 2. Scrunch the towel toward you with your toes.  Then use your toes to push the towel back into place. 3. Repeat 8 to 12 times. [place] pick-ups 1. Put some marbles on the floor next to a cup. 
2. Sit in a chair, and use the toes of your affected foot to lift up one marble from the floor at a time. Then try to put the marble in the cup. 
3. Repeat 8 to 12 times. Follow-up care is a key part of your treatment and safety. Be sure to make and go to all appointments, and call your doctor if you are having problems. It's also a good idea to know your test results and keep a list of the medicines you take. Where can you learn more? Go to http://sharathVI Systemshank.info/. Enter O469 in the search box to learn more about \"Foot Sprain (Metatarsophalangeal Joint): Rehab Exercises. \" Current as of: March 21, 2017 Content Version: 11.3 © 7988-8082 [employer] Care instructions adapted under license by [employer] (which disclaims liability or warranty for this information). If you have questions about a medical condition or this instruction, always ask your healthcare professional. [place] [address] any warranty or liability for your use of this information. Toe Fracture: Rehab Exercises Your Care Instructions Here are some examples of typical rehabilitation exercises for your condition. Start each exercise slowly. Ease off the exercise if you start to have pain. Your doctor or physical therapist will tell you when you can start these exercises and which ones will work best for you. How to do the exercises Passive toe exercise 5. Sit on the floor, with the heel of your affected foot on the floor. Use one hand to hold your foot steady. 6. Using the thumb and index (pointing) finger of your other hand, slowly bend your toe forward and then backward. Hold each position for about 15 seconds. 7. Repeat 2 to 4 times. Toe curl 5. Sit on the floor, with the heel of your affected foot on the floor. 6. Gently curl your toes forward and then backward. Hold each position for about 6 seconds. 7. Repeat 8 to 12 times. Towel scrunches 5. Sit in a chair, and place your affected foot on a towel on the floor. 6. Scrunch the towel toward you with your toes. Then use your toes to push the towel back into place. 7. Repeat 8 to 12 times. [place] pick-ups 6. Put some marbles on the floor next to a cup. 
7. Sit in a chair, and use the toes of your affected foot to lift up one marble from the floor at a time. Then try to put the marble in the cup. 
8. Repeat 8 to 12 times. Towel stretch 4. Sit with your legs extended and knees straight. 5. Place a towel or belt around your foot just under your toes. 6. Hold both ends of the towel or belt, with your hands above your knees. 7. Pull back with the towel or belt so that your foot stretches toward you. 8. Hold the position for at least 15 to 30 seconds. 9. Repeat 2 to 4 times. Follow-up care is a key part of your treatment and safety. Be sure to make and go to all appointments, and call your doctor if you are having problems. It's also a good idea to know your test results and keep a list of the medicines you take. Where can you learn more? Go to http://sharath-hank.info/. Enter [phone] in the search box to learn more about \"Toe Fracture: Rehab Exercises. \" Current as of: March 21, 2017 Content Version: 11.3 © 6965-4000 [employer]. Care instructions adapted under license by [employer] (which disclaims liability or warranty for this information). If you have questions about a medical condition or this instruction, always ask your healthcare professional. [address] any warranty or liability for your use of this information. Introducing [facility]!    
 [facility] introduces [employer] patient portal. Now you can access parts of your medical record, email your doctor's office, and request medication refills online. 1. In your internet browser, go to [email or web address] 2. Click on the First Time User? Click Here link in the Sign In box. You will see the New Member Sign Up page. 3. Enter your [employer] Access Code exactly as it appears below. You will not need to use this code after youve completed the sign-up process. If you do not sign up before the expiration date, you must request a new code. · [employer] Access Code: [email or web address] Expires: 10/22/2017  3:55 PM 
 
4. Enter the last four digits of your Social Security Number (xxxx) and Date of Birth (mm/dd/yyyy) as indicated and click Submit. You will be taken to the next sign-up page. 5. Create a [employer] Blox ID. This will be your [employer] login ID and cannot be changed, so think of one that is secure and easy to remember. 6. Create a [employer] password. You can change your password at any time. 7. Enter your Password Reset Question and Answer. This can be used at a later time if you forget your password. 8. Enter your e-mail address. You will receive e-mail notification when new information is available in [address] 9. Click Sign Up. You can now view and download portions of your medical record. 10. Click the Download Summary menu link to download a portable copy of your medical information. If you have questions, please visit the Frequently Asked Questions section of the [employer] website. Remember, [employer] is NOT to be used for urgent needs. For medical emergencies, dial 911. Now available from your iPhone and Android! Please provide this summary of care documentation to your next provider. Your primary care clinician is listed as Ubaldo [place]. If you have any questions after today's visit, please call [phone].

## 2017-10-04 NOTE — PROGRESS NOTES
AMBULATORY PROGRESS NOTE      Patient: Gracy Fuller             MRN: [id number]     SSN: [id number] Body mass index is 35.95 kg/(m^2). YOB: 1965     AGE: 50 y.o. EX: male    PCP: Amena Pike MD    IMPRESSION/DIAGNOSIS AND TREATMENT PLAN     DIAGNOSES  1. Metatarsalgia of left foot    2. Sesamoiditis        Orders Placed This Encounter    AMB SUPPLY ORDER      Gracy Fuller understands his diagnoses and the proposed plan. Plan:    Recommendation is for four-week followup. We will get him a custom trilaminar orthotic for his forefoot. Should his symptoms worsen, of course, we will see him sooner. HPI AND EXAMINATION     Gracy Fuller IS A 50 y.o. male who presents to my outpatient office for follow up of left foot pain due to Ponce's neuroma. At last visit, Mahsa Mustafa PA-C, ordered an MRI of the left foot and left ankle. The patient presents to the office today The patient is seen here for followup being treated for left forefoot pain. He apparently had fallen from about two or three feet up off a ladder and his left leg got caught in the rung of the ladder. Ever since then, he has been having pain and discomfort to his plantar portion of his left forefoot, but prior to that, he did not have any pain at all to his left foot and ankle. He had an MRI from [facility]. The results are listed as below. His chief complaint is pain to the plantar portion of his left foot. He points to the lateral sesamoid and into the first interdigital web space region he has point discomfort and of the second metatarsal head plantar-moses. It hurts when he weightbears, not when he is off of his foot. No numbness or tingling is reported to the left foot and ankle regions. His symptoms have not improved overall since this injury that occurred about one month ago. He has been wearing sandals and arrives here wearing sandals at this current time. .   As such, he had an MRI of the left ankle and left foot with and without contrast.  This was done at [facility] imaging center at [facility]. This was actually done on October 2, 2017. The MRI of the ankle revealed:    1. No fracture. 2. There is thickening of the plantar fascia resulting from chronic plantar fasciitis and some partial chronic tearing superimposed with acute inflammation. 3. There is a prominence with varices that may contribute to compression along the medial and lateral plantar nerves. 4. Otherwise, the ligaments and tendons of the ankle are intact. The MRI of the foot, with and without contrast, showed a rounding, enhancing nodule to the plantar aspect of the first metatarsal head posterior to the tibial sesamoid. Neuroma or synovitis is favored over the granulation tissue from trauma or injury. A mass such as synovial cell carcinoma is unlikely but cannot be excluded. There is mild intermetatarsal bursitis of the first and second digits. There is a moderate edema at the dorsal aspect of the fifth metatarsal, and there is mild forefoot arthropathy. GEN:  Alert, well developed, well nourished, well appearing 50 y.o. male in no acute distress. PSYCH:  Normal affect, mood, and conduct. alert, oriented x 3 alert, oriented x 3, no dementia  M/S EXAMINATION OF: left foot and ankle  SKIN: mild edema, no erythema, no ecchymosis, no warmth  TENDERNESS:  mild tenderness to palpation to plantar fascia (medial and lateral) and tenderness to 1st and 2nd webspace    plantarward  NEUROVASCULAR:  grossly intact. Positive distal pulses and capillary refill. DVT ASSESSMENT:  The calf is not tender to palpation.  No evidence of DVT seen on physical exam.  ROM:  Within normal limits     CHART REVIEW     Past Medical History:   Diagnosis Date    Torn rotator cuff 08/2017    left rotator cuff tear     Current Outpatient Prescriptions   Medication Sig    naproxen sodium (ALEVE) 220 mg cap Take  by mouth.  raNITIdine (ZANTAC 75) 75 mg tablet Take 75 mg by mouth nightly. Indications: HEARTBURN    oxyCODONE IR (ROXICODONE) 5 mg immediate release tablet Take 1-3 Tabs by mouth every four (4) hours as needed. Max Daily Amount: 90 mg. Indications: Pain     No current facility-administered medications for this visit. No Known Allergies  Past Surgical History:   Procedure Laterality Date    HAND/FINGER SURGERY UNLISTED      HX KNEE ARTHROSCOPY  1995    right knee    HX OTHER SURGICAL  2000    finger surgery, right 3rd finger. Reattach tendons    HX ROTATOR CUFF REPAIR  08/22/2017    left     Social History     Occupational History    Not on file. Social History Main Topics    Smoking status: Never Smoker    Smokeless tobacco: Never Used      Comment: pt counsled to continue to not smoke.  Alcohol use Yes      Comment: occasionally    Drug use: No    Sexual activity: Not on file     Family History   Problem Relation Age of Onset    Cancer Father      colon ca at age 77 yo    Diabetes Sister        REVIEW OF SYSTEMS : 10/4/2017  ALL BELOW ARE Negative except : SEE HPI       Constitutional: Negative for fever, chills and weight loss. Neg Weigh Loss  Cardiovascular: Negative for chest pain, claudication and leg swelling. SOB, LAM   Gastrointestinal: Negative for  pain, N/V/D/C, Blood in stool or urine,dysuria, hematuria,        Incontinence, pelvic pain  Musculoskeletal: see HPI. Neurological: Negative for dizziness and weakness. Negative for headaches,Visual Changes, Confusion, Seizures,   Psychiatric/Behavioral: Negative for depression, memory loss and substance abuse. Extremities:  Negative for  hair changes, rash or skin lesion changes. Hematologic: Negative for Bleeding problems, bruising, pallor or swollen lymph nodes.   Peripheral Vascular: No calf pain, vascular vein tenderness to calf pain              No calf throbbing, posterior knee throbbing pain    DIAGNOSTIC IMAGING     No notes on file     Written by Kassie Arias, as dictated by Cesia Ríos MD. I, DrElida, Cesia Ríos MD, confirm that all documentation is accurate.

## 2017-10-04 NOTE — PATIENT INSTRUCTIONS
Please follow up in 4 weeks. You are advised to contact us if your condition worsens. The provider has ordered a custom brace/orthotic for you. This will be customized and made for you by an outside facility. Please contact the orthotist at one of the below offices for your custom fitting and follow up in the office with the doctor or his physicians assistant 3 weeks after you have been wearing the brace. Bertha Cantu at [facility]:     [address] [place] [address] Chris Velazquez   Phone: [phone] Rony Blue. Jaiden Davenport [facility]  Phone: [phone] ([address]): Rehab Exercises  Your Care Instructions  Here are some examples of typical rehabilitation exercises for your condition. Start each exercise slowly. Ease off the exercise if you start to have pain. Your doctor or physical therapist will tell you when you can start these exercises and which ones will work best for you. How to do the exercises  Great toe extension stretch    1. Sit in a chair, and put your affected foot across your other knee. 2. Grasp your heel with one hand, and then slowly pull your big toe back with your other hand. Pull your toe back toward your ankle until you feel a stretch along the bottom of your foot. 3. Hold the stretch for at least 15 to 30 seconds. 4. Repeat 2 to 4 times. Great toe flexion stretch    1. Sit in a chair, and put your affected foot across your other knee. 2. Grasp your heel with one hand, and then slowly push your big toe down with your other hand. Push your toe down and away from your ankle until you feel a stretch along the top of your foot. 3. Hold the stretch for at least 15 to 30 seconds. 4. Repeat 2 to 4 times. Great toe traction stretch    1. Sit in a chair, and put your affected foot across your other knee. 2. Grasp your heel and the middle part of your foot with one hand.  With your other hand, use your thumb and middle finger to grasp your big toe and gently pull it straight out and away from your foot. 3. Hold the stretch for at least 15 to 30 seconds. 4. Repeat 2 to 4 times. Forefoot stretch    1. Sit in a chair with your affected leg bent, and put the heel of your affected foot on the edge of the seat. Or you can sit on the floor with your leg bent and your heel digging into the floor. 2. Grasp your foot with both hands. Your thumbs will be on the top side of your foot just below the joints where your toes connect to your foot. Your other fingers will be underneath your foot. 3. Use the fingers underneath your foot to push up on the two toes that are closest to your big toe. Then use your thumbs and hands to spread your foot and toes outward until you feel a stretch in your foot. The outer edges of your foot will curve downward as you push up on the middle toes. Hold the stretch for at least 15 to 30 seconds. 4. Next, slowly press your thumbs down on the two toes that are closest to your big toe. The outer edges of your foot will curve upward. Hold the stretch for at least 15 to 30 seconds until you feel a stretch in your foot. 5. Repeat 2 to 4 times. Towel scrunches    1. Sit in a chair, and place both feet on a towel on the floor. 2. Scrunch the towel toward you with your toes. Then use your toes to push the towel back into place. 3. Repeat 8 to 12 times. [place] pick-ups    1. Put some marbles on the floor next to a cup.  2. Sit in a chair, and use the toes of your affected foot to lift up one marble from the floor at a time. Then try to put the marble in the cup.  3. Repeat 8 to 12 times. Follow-up care is a key part of your treatment and safety. Be sure to make and go to all appointments, and call your doctor if you are having problems. It's also a good idea to know your test results and keep a list of the medicines you take. Where can you learn more?   Go to http://sharath-hank.info/. Enter D319 in the search box to learn more about \"Foot Sprain (Metatarsophalangeal Joint): Rehab Exercises. \"  Current as of: March 21, 2017  Content Version: 11.3  © 0374-9066 [employer]. Care instructions adapted under license by Docphin (which disclaims liability or warranty for this information). If you have questions about a medical condition or this instruction, always ask your healthcare professional. [address] any warranty or liability for your use of this information. Toe Fracture: Rehab Exercises  Your Care Instructions  Here are some examples of typical rehabilitation exercises for your condition. Start each exercise slowly. Ease off the exercise if you start to have pain. Your doctor or physical therapist will tell you when you can start these exercises and which ones will work best for you. How to do the exercises  Passive toe exercise    5. Sit on the floor, with the heel of your affected foot on the floor. Use one hand to hold your foot steady. 6. Using the thumb and index (pointing) finger of your other hand, slowly bend your toe forward and then backward. Hold each position for about 15 seconds. 7. Repeat 2 to 4 times. Toe curl    5. Sit on the floor, with the heel of your affected foot on the floor. 6. Gently curl your toes forward and then backward. Hold each position for about 6 seconds. 7. Repeat 8 to 12 times. Towel scrunches    5. Sit in a chair, and place your affected foot on a towel on the floor. 6. Scrunch the towel toward you with your toes. Then use your toes to push the towel back into place. 7. Repeat 8 to 12 times. [place] pick-ups    6. Put some marbles on the floor next to a cup.  7. Sit in a chair, and use the toes of your affected foot to lift up one marble from the floor at a time. Then try to put the marble in the cup.  8. Repeat 8 to 12 times.   Towel stretch    4. Sit with your legs extended and knees straight. 5. Place a towel or belt around your foot just under your toes. 6. Hold both ends of the towel or belt, with your hands above your knees. 7. Pull back with the towel or belt so that your foot stretches toward you. 8. Hold the position for at least 15 to 30 seconds. 9. Repeat 2 to 4 times. Follow-up care is a key part of your treatment and safety. Be sure to make and go to all appointments, and call your doctor if you are having problems. It's also a good idea to know your test results and keep a list of the medicines you take. Where can you learn more? Go to http://sharath-hank.info/. Enter [phone] in the search box to learn more about \"Toe Fracture: Rehab Exercises. \"  Current as of: March 21, 2017  Content Version: 11.3  © 5676-3602 EngageSciences, IPNetVoice. Care instructions adapted under license by 5Rocks (which disclaims liability or warranty for this information). If you have questions about a medical condition or this instruction, always ask your healthcare professional. [place] [address] any warranty or liability for your use of this information.

## 2017-10-05 DIAGNOSIS — M72.2 PLANTAR FASCIITIS OF LEFT FOOT: ICD-10-CM

## 2017-10-05 DIAGNOSIS — M79.672 LEFT FOOT PAIN: ICD-10-CM

## 2017-10-05 DIAGNOSIS — G57.62 MORTON'S NEUROMA OF LEFT FOOT: ICD-10-CM

## 2017-10-13 ENCOUNTER — OFFICE VISIT (OUTPATIENT)
Dept: ORTHOPEDIC SURGERY | Facility: CLINIC | Age: 52
End: 2017-10-13

## 2017-10-13 VITALS
HEIGHT: 74 IN | SYSTOLIC BLOOD PRESSURE: 122 MMHG | DIASTOLIC BLOOD PRESSURE: 87 MMHG | OXYGEN SATURATION: 95 % | WEIGHT: 284 LBS | HEART RATE: 83 BPM | TEMPERATURE: 97.9 F | BODY MASS INDEX: 36.45 KG/M2 | RESPIRATION RATE: 18 BRPM

## 2017-10-13 DIAGNOSIS — Z98.890 S/P ROTATOR CUFF REPAIR: Primary | ICD-10-CM

## 2017-10-13 NOTE — MR AVS SNAPSHOT
Visit Information Date & Time Provider Department Dept. Phone Encounter #  
 10/13/2017  4:30 PM Juan Diego Barroso, [address] [facility] [phone] [id number] Follow-up Instructions Return in about 4 weeks (around 11/10/2017). Your Appointments 10/16/2017  1:00 PM  
New Patient with Virgie Cortez MD  
[facility] Appt Note: LUMBAR RADICULOPATHY/ WORK COMP/REF BY DR CHE/ *ADVISED PT TO COME EARLY W. PHOTO ID & INS. CARD, CURRENT MEDICATION LIST & DOSAGE TO THE [facility] LOCATION  
 [address] [facility] [address]  
[phone]  
  
   
 [address] [facility],[address] [place] [address]  
  
    
 10/31/2017 10:20 AM  
Follow Up with Jen Carroll MD  
[facility] ([facility]) Appt Note: left foot 1 mo fu  
 [address], [address] [facility]  
[phone] [address] [place] [address], [facility] [address] Upcoming Health Maintenance Date Due FOBT Q 1 YEAR AGE 50-75 1/8/2017 INFLUENZA AGE 9 TO ADULT 8/1/2017 DTaP/Tdap/Td series (2 - Td) 7/25/2027 Allergies as of 10/13/2017  Review Complete On: 10/13/2017 By: Juan Diego Barroso PA-C No Known Allergies Current Immunizations  Never Reviewed No immunizations on file. Not reviewed this visit You Were Diagnosed With   
  
 Codes Comments S/P rotator cuff repair    -  Primary ICD-10-CM: E73.173 ICD-9-CM: V45.89 Vitals BP Pulse Temp Resp Height(growth percentile) Weight(growth percentile) 122/87 83 97.9 °F (36.6 °C) (Oral) 18 6' 2\" (1.88 m) 284 lb (128.8 kg) SpO2 BMI Smoking Status 95% 36.46 kg/m2 Never Smoker BMI and BSA Data Body Mass Index Body Surface Area  
 36.46 kg/m 2 2.59 m 2 Preferred Pharmacy Pharmacy Name Phone [facility], [address] [phone] Your Updated Medication List  
  
   
This list is accurate as of: 10/13/17  4:50 PM.  Always use your most recent med list.  
  
  
  
  
 ALEVE 220 mg Cap Generic drug:  naproxen sodium Take  by mouth. oxyCODONE IR 5 mg immediate release tablet Commonly known as:  Nino King Take 1-3 Tabs by mouth every four (4) hours as needed. Max Daily Amount: 90 mg. Indications: Pain ZANTAC 75 75 mg tablet Generic drug:  raNITIdine Take 75 mg by mouth nightly. Indications: HEARTBURN Follow-up Instructions Return in about 4 weeks (around 11/10/2017). Introducing [facility] & HEALTH SERVICES! [employer] introduces Vitriflex patient portal. Now you can access parts of your medical record, email your doctor's office, and request medication refills online. 1. In your internet browser, go to [email or web address] 2. Click on the First Time User? Click Here link in the Sign In box. You will see the New Member Sign Up page. 3. Enter your Vitriflex Access Code exactly as it appears below. You will not need to use this code after youve completed the sign-up process. If you do not sign up before the expiration date, you must request a new code. · Vitriflex Access Code: [email or web address] Expires: 10/22/2017  3:55 PM 
 
4. Enter the last four digits of your Social Security Number (xxxx) and Date of Birth (mm/dd/yyyy) as indicated and click Submit. You will be taken to the next sign-up page. 5. Create a Vitriflex ID. This will be your Vitriflex login ID and cannot be changed, so think of one that is secure and easy to remember. 6. Create a Vitriflex password. You can change your password at any time. 7. Enter your Password Reset Question and Answer. This can be used at a later time if you forget your password. 8. Enter your e-mail address. You will receive e-mail notification when new information is available in [address] 9. Click Sign Up. You can now view and download portions of your medical record. 10. Click the Download Summary menu link to download a portable copy of your medical information. If you have questions, please visit the Frequently Asked Questions section of the [email or web address] website. Remember, Netshow.me is NOT to be used for urgent needs. For medical emergencies, dial 911. Now available from your iPhone and Android! Please provide this summary of care documentation to your next provider. Your primary care clinician is listed as Ubaldo [place]. If you have any questions after today's visit, please call [phone].

## 2017-10-13 NOTE — PROGRESS NOTES
HISTORY OF PRESENT ILLNESS:  Lesvia Kat returns six weeks status post his left shoulder open rotator cuff repair. He is doing well. He is continuing outpatient physical therapy. He has had some stiffness in his neck and really both shoulders of recent. His motion has improved significantly. He is not on any pain medication at this time for the rotator cuff on the left. PHYSICAL EXAM:  He has a well-healed surgical incision over the anterior superior shoulder. There is forward flexion noted at 60°. He begins shoulder hike at 50° through to endpoint. He has active glenohumeral abduction of 60° with resisted deltoid strength of -4/5. There is left elbow range of motion of 110-5°. Distal sensation is intact fully to the left upper extremity. The posterior paracervical region is diffusely tender more so on the left than the right. He has full, unrestricted range of motion of his cervical spine with no chin to chest deficit and extension is 15°. PLAN:   The patient is going to continue physical therapy. Unfortunately, his note was not available for review today. We will look into that on Monday and see him back in about one month. Today, all of he and his wifes questions were answered to their satisfaction.

## 2017-10-16 ENCOUNTER — TELEPHONE (OUTPATIENT)
Dept: ORTHOPEDIC SURGERY | Age: 52
End: 2017-10-16

## 2017-10-16 ENCOUNTER — OFFICE VISIT (OUTPATIENT)
Dept: ORTHOPEDIC SURGERY | Age: 52
End: 2017-10-16

## 2017-10-16 VITALS
DIASTOLIC BLOOD PRESSURE: 94 MMHG | HEIGHT: 74 IN | TEMPERATURE: 98.2 F | BODY MASS INDEX: 36.73 KG/M2 | WEIGHT: 286.2 LBS | SYSTOLIC BLOOD PRESSURE: 138 MMHG | RESPIRATION RATE: 18 BRPM | OXYGEN SATURATION: 97 % | HEART RATE: 79 BPM

## 2017-10-16 DIAGNOSIS — M54.12 CERVICAL RADICULAR PAIN: Primary | ICD-10-CM

## 2017-10-16 DIAGNOSIS — M54.50 NONSPECIFIC PAIN IN THE LUMBAR REGION: ICD-10-CM

## 2017-10-16 DIAGNOSIS — M54.2 NONSPECIFIC PAIN IN THE NECK REGION: ICD-10-CM

## 2017-10-16 RX ORDER — GABAPENTIN 300 MG/1
CAPSULE ORAL
Qty: 60 CAP | Refills: 1 | Status: SHIPPED | OUTPATIENT
Start: 2017-10-16 | End: 2018-02-19 | Stop reason: ALTCHOICE

## 2017-10-16 NOTE — PROGRESS NOTES
Verbal order entered per Dr. Payton Sommers as documented on blue sheet:   -MRI C spine, 3 months neck pain right upper extremity post fall  -gabapentin 300 mg, 1 tab PO QHS for 3 nights then increase to BID, disp 60, 1 RF

## 2017-10-16 NOTE — MR AVS SNAPSHOT
Visit Information Date & Time Provider Department Dept. Phone Encounter #  
 10/16/2017  1:00  [address],  [address], [address] [facility] [phone] [id number] Follow-up Instructions Return for MRI/CT f/u. Your Appointments 10/31/2017 10:20 AM  
Follow Up with Wes Barroso MD  
[facility] ([address]) Appt Note: left foot 1 mo fu  
 [address], [address] [facility]  
[phone] [address] [facility], [address]  
  
    
 11/13/2017 11:00 AM  
Follow Up with Marilu Landon PA-C  
[facility] [place] [address] [address]) Appt Note: 1 MO FU LT SHOULDER  
 [address] [facility], [address] [facility] [id number] [phone]  
  
   
 [address] [facility], [address] [address] Upcoming Health Maintenance Date Due FOBT Q 1 YEAR AGE 50-75 1/8/2017 INFLUENZA AGE 9 TO ADULT 8/1/2017 DTaP/Tdap/Td series (2 - Td) 7/25/2027 Allergies as of 10/16/2017  Review Complete On: 10/16/2017 By: [address], MD  
 No Known Allergies Current Immunizations  Never Reviewed No immunizations on file. Not reviewed this visit You Were Diagnosed With   
  
 Codes Comments Nonspecific pain in the lumbar region    -  Primary ICD-10-CM: M54.5 ICD-9-CM: 724.2 Nonspecific pain in the neck region     ICD-10-CM: M54.2 ICD-9-CM: 723.1 Vitals BP Pulse Temp Resp Height(growth percentile) Weight(growth percentile) (!) 138/94 79 98.2 °F (36.8 °C) (Oral) 18 6' 2\" (1.88 m) 286 lb 3.2 oz (129.8 kg) SpO2 BMI Smoking Status 97% 36.75 kg/m2 Never Smoker BMI and BSA Data Body Mass Index Body Surface Area 36.75 kg/m 2 2.6 m 2 Preferred Pharmacy Pharmacy Name Phone  [place], [address] [facility]ANNA/[facility] Beto Amaya [phone] Your Updated Medication List  
  
   
This list is accurate as of: 10/16/17  1:53 PM.  Always use your most recent med list.  
  
  
  
  
 ALEVE 220 mg Cap Generic drug:  naproxen sodium Take  by mouth.  
  
 gabapentin 300 mg capsule Commonly known as:  NEURONTIN  
1 tab PO QHS for 3 nights then increase to BID, disp  
  
 oxyCODONE IR 5 mg immediate release tablet Commonly known as:  Tyree Dodie Take 1-3 Tabs by mouth every four (4) hours as needed. Max Daily Amount: 90 mg. Indications: Pain ZANTAC 75 75 mg tablet Generic drug:  raNITIdine Take 75 mg by mouth nightly. Indications: HEARTBURN Prescriptions Sent to Pharmacy Refills  
 gabapentin (NEURONTIN) 300 mg capsule 1 Si tab PO QHS for 3 nights then increase to BID, disp Class: Normal  
 Pharmacy: [facility] [address] [place] [address] #: [phone] We Performed the Following AMB POC XRAY, SPINE, CERVICAL; 2 OR 3 [62779 CPT(R)] AMB POC XRAY, SPINE, LUMBOSACRAL; 2 O [76003 CPT(R)] Follow-up Instructions Return for MRI/CT f/u. To-Do List   
 10/16/2017 Imaging:  MRI CERV SPINE WO CONT Patient Instructions MyChart Activation Thank you for requesting access to trueEX. Please follow the instructions below to securely access and download your online medical record. trueEX allows you to send messages to your doctor, view your test results, renew your prescriptions, schedule appointments, and more. How Do I Sign Up? 1. In your internet browser, go to [email or web address] 
2. Click on the First Time User? Click Here link in the Sign In box. You will be redirect to the New Member Sign Up page. 3. Enter your trueEX Access Code exactly as it appears below. You will not need to use this code after youve completed the sign-up process.  If you do not sign up before the expiration date, you must request a new code. PassionTag Access Code: [email or web address] Expires: 10/22/2017  3:55 PM (This is the date your PassionTag access code will ) 4. Enter the last four digits of your Social Security Number (xxxx) and Date of Birth (mm/dd/yyyy) as indicated and click Submit. You will be taken to the next sign-up page. 5. Create a PassionTag ID. This will be your PassionTag login ID and cannot be changed, so think of one that is secure and easy to remember. 6. Create a PassionTag password. You can change your password at any time. 7. Enter your Password Reset Question and Answer. This can be used at a later time if you forget your password. 8. Enter your e-mail address. You will receive e-mail notification when new information is available in [address] 9. Click Sign Up. You can now view and download portions of your medical record. 10. Click the Download Summary menu link to download a portable copy of your medical information. Additional Information If you have questions, please visit the Frequently Asked Questions section of the PassionTag website at [email or web address] Remember, PassionTag is NOT to be used for urgent needs. For medical emergencies, dial 911. Cervical Disc Disease: Care Instructions Your Care Instructions Cervical disc disease results from damage, disease, or wear and tear to the discs between the bones (vertebra) in your neck. The discs act as shock absorbers for the spine and keep the spine flexible. When a disc is damaged, it can bulge out and press against the nerve roots or spinal cord. This is sometimes called a herniated or \"slipped disc. \" This pressure can cause pain and numbness or tingling in your arms and hands. It can also cause weakness in your legs. An accident can damage a disc and cause it to break open (rupture). Aging and hard physical work can also cause damage to cervical discs. The first treatments for cervical disc disease include physical therapy, special neck exercises, heat, and pain medicine. If these fail, your doctor may inject steroids and pain medicine into your neck. Surgery is usually done only if other treatments have not worked. Follow-up care is a key part of your treatment and safety. Be sure to make and go to all appointments, and call your doctor if you are having problems. It's also a good idea to know your test results and keep a list of the medicines you take. How can you care for yourself at home? · Take pain medicines exactly as directed. ¨ If the doctor gave you a prescription medicine for pain, take it as prescribed. ¨ If you are not taking a prescription pain medicine, ask your doctor if you can take an over-the-counter medicine. · Don't spend too long in one position. Take short breaks to move around and change positions. · Wear a seat belt and shoulder harness when you are in a car. · Sleep with a pillow under your head and neck that keeps your neck straight. · Follow your doctor's instructions for gentle neck-stretching exercises. · Do not smoke. Smoking can slow healing of your discs. If you need help quitting, talk to your doctor about stop-smoking programs and medicines. These can increase your chances of quitting for good. · Avoid strenuous work or exercise until your doctor says it is okay. When should you call for help? Call 911 anytime you think you may need emergency care. For example, call if: 
· You are unable to move an arm or a leg at all. Call your doctor now or seek immediate medical care if: 
· You have new or worse symptoms in your arms, legs, chest, belly, or buttocks. Symptoms may include: ¨ Numbness or tingling. ¨ Weakness. ¨ Pain. · You lose bladder or bowel control. Watch closely for changes in your health, and be sure to contact your doctor if: 
· You are not getting better as expected. Where can you learn more? Go to http://sharath-hank.info/. Enter N118 in the search box to learn more about \"Cervical Disc Disease: Care Instructions. \" Current as of: March 21, 2017 Content Version: 11.3 © 9515-3420 CRMnext. Care instructions adapted under license by QuantiaMD (which disclaims liability or warranty for this information). If you have questions about a medical condition or this instruction, always ask your healthcare professional. [facility] [address] any warranty or liability for your use of this information. Introducing [facility] & [facility] SERVICES! [employer] introduces [employer] patient portal. Now you can access parts of your medical record, email your doctor's office, and request medication refills online. 1. In your internet browser, go to [email or web address] 2. Click on the First Time User? Click Here link in the Sign In box. You will see the New Member Sign Up page. 3. Enter your [employer] Access Code exactly as it appears below. You will not need to use this code after youve completed the sign-up process. If you do not sign up before the expiration date, you must request a new code. · [employer] Access Code: [email or web address] Expires: 10/22/2017  3:55 PM 
 
4. Enter the last four digits of your Social Security Number (xxxx) and Date of Birth (mm/dd/yyyy) as indicated and click Submit. You will be taken to the next sign-up page. 5. Create a [employer] ID. This will be your [employer] login ID and cannot be changed, so think of one that is secure and easy to remember. 6. Create a [employer] password. You can change your password at any time. 7. Enter your Password Reset Question and Answer. This can be used at a later time if you forget your password. 8. Enter your e-mail address. You will receive e-mail notification when new information is available in [address] 9. Click Sign Up.  You can now view and download portions of your medical record. 10. Click the Download Summary menu link to download a portable copy of your medical information. If you have questions, please visit the Frequently Asked Questions section of the "[employer] website. Remember, "[employer] is NOT to be used for urgent needs. For medical emergencies, dial 911. Now available from your iPhone and Android! Please provide this summary of care documentation to your next provider. Your primary care clinician is listed as Ubaldo [place]. If you have any questions after today's visit, please call [phone].

## 2017-10-16 NOTE — PATIENT INSTRUCTIONS
[facility] Activation    Thank you for requesting access to [facility]. Please follow the instructions below to securely access and download your online medical record. [facility] allows you to send messages to your doctor, view your test results, renew your prescriptions, schedule appointments, and more. How Do I Sign Up? 1. In your internet browser, go to [email or web address]  2. Click on the First Time User? Click Here link in the Sign In box. You will be redirect to the New Member Sign Up page. 3. Enter your [facility] Access Code exactly as it appears below. You will not need to use this code after youve completed the sign-up process. If you do not sign up before the expiration date, you must request a new code. [facility] Access Code: [email or web address]  Expires: 10/22/2017  3:55 PM (This is the date your [facility] access code will )    4. Enter the last four digits of your Social Security Number (xxxx) and Date of Birth (mm/dd/yyyy) as indicated and click Submit. You will be taken to the next sign-up page. 5. Create a [facility] ID. This will be your [facility] login ID and cannot be changed, so think of one that is secure and easy to remember. 6. Create a [facility] password. You can change your password at any time. 7. Enter your Password Reset Question and Answer. This can be used at a later time if you forget your password. 8. Enter your e-mail address. You will receive e-mail notification when new information is available in [address]. 9. Click Sign Up. You can now view and download portions of your medical record. 10. Click the Download Summary menu link to download a portable copy of your medical information. Additional Information    If you have questions, please visit the Frequently Asked Questions section of the [employer] website at [email or web address] Remember, [facility] is NOT to be used for urgent needs. For medical emergencies, dial 911.          Cervical Disc Disease: Care Instructions  Your Care Instructions    Cervical disc disease results from damage, disease, or wear and tear to the discs between the bones (vertebra) in your neck. The discs act as shock absorbers for the spine and keep the spine flexible. When a disc is damaged, it can bulge out and press against the nerve roots or spinal cord. This is sometimes called a herniated or \"slipped disc. \" This pressure can cause pain and numbness or tingling in your arms and hands. It can also cause weakness in your legs. An accident can damage a disc and cause it to break open (rupture). Aging and hard physical work can also cause damage to cervical discs. The first treatments for cervical disc disease include physical therapy, special neck exercises, heat, and pain medicine. If these fail, your doctor may inject steroids and pain medicine into your neck. Surgery is usually done only if other treatments have not worked. Follow-up care is a key part of your treatment and safety. Be sure to make and go to all appointments, and call your doctor if you are having problems. It's also a good idea to know your test results and keep a list of the medicines you take. How can you care for yourself at home? · Take pain medicines exactly as directed. ¨ If the doctor gave you a prescription medicine for pain, take it as prescribed. ¨ If you are not taking a prescription pain medicine, ask your doctor if you can take an over-the-counter medicine. · Don't spend too long in one position. Take short breaks to move around and change positions. · Wear a seat belt and shoulder harness when you are in a car. · Sleep with a pillow under your head and neck that keeps your neck straight. · Follow your doctor's instructions for gentle neck-stretching exercises. · Do not smoke. Smoking can slow healing of your discs. If you need help quitting, talk to your doctor about stop-smoking programs and medicines.  These can increase your chances of quitting for good. · Avoid strenuous work or exercise until your doctor says it is okay. When should you call for help? Call 911 anytime you think you may need emergency care. For example, call if:  · You are unable to move an arm or a leg at all. Call your doctor now or seek immediate medical care if:  · You have new or worse symptoms in your arms, legs, chest, belly, or buttocks. Symptoms may include:  ¨ Numbness or tingling. ¨ Weakness. ¨ Pain. · You lose bladder or bowel control. Watch closely for changes in your health, and be sure to contact your doctor if:  · You are not getting better as expected. Where can you learn more? Go to http://sharath-hank.info/. Enter N118 in the search box to learn more about \"Cervical Disc Disease: Care Instructions. \"  Current as of: March 21, 2017  Content Version: 11.3  © 0403-5291 [employer]. Care instructions adapted under license by [employer] ([employer]) (which disclaims liability or warranty for this information). If you have questions about a medical condition or this instruction, always ask your healthcare professional. [place] [address] any warranty or liability for your use of this information.

## 2017-10-16 NOTE — TELEPHONE ENCOUNTER
Order and office notes faxed to Daniella Mccallum with [employer] asking for authorization for MRI C-Spine, auth PENDING

## 2017-10-16 NOTE — PROGRESS NOTES
[address] [facility] [address].  [address], [address]  [place], [facility]  Phone: [phone]  Fax: [phone]        Carlos Gerardo  : 1965  PCP: Vero Thurston MD      NEW PATIENT      ASSESSMENT AND PLAN     Diagnoses and all orders for this visit:    1. Nonspecific pain in the lumbar region  -     [87547] L/S 2-3 views  -     gabapentin (NEURONTIN) 300 mg capsule; 1 tab PO QHS for 3 nights then increase to BID, disp    2. Nonspecific pain in the neck region  -     [49579] C Spine 2-3V  -     MRI CERV SPINE WO CONT; Future    1. Advised to stay active as tolerated. 2. Cervical spine MRI. 3. Trial of Gabapentin. 4. Given information on cervical DDD. Follow-up Disposition:  Return for MRI/CT f/u. CHIEF COMPLAINT  Anca Chen is seen today in consultation at the request of Dr. Marisol Dubose for complaints of neck and shoulder pain. HISTORY OF PRESENT ILLNESS  Anca Chen is a 50 y.o. male. RHD. Today pt c/o neck pain since 19. Pt has had trauma that caused pain. He fell off of an 8 foot ladder (fell about 3-4 feet) and onto concrete. He went to [facility] the next day and had surgery on his shoulder. Pt reports Hx of pinched nerve in his neck radiating into his RUE. He notes that the pain went away after about 6 weeks at that episode years ago. He admits to posterior headaches. His pain bothers him most with laying down. Pt underwent a LT open rotator cuff repair 17 by Dr. Marisol Dubose after his fall. Pt reports pain does radiate into his RT shoulder and arm. He has been noticing some  imbalance. Pt denies weakness in his BUE. His pain does interrupt his sleep at night. He notes that he was sent to the office today for pain and tightness in his LT calf. He does not believe that this is coming from his back. His pain increases with standing and walking. He denies any pain or issues in his low back.  He is not bothered much by his LT calf pain unless he is walking. Pt denies saddle paresthesias. Pt has not been through physical therapy for his neck. He has no benefit from Aleve, has stopped taking. He is no longer taking post-op Oxycodone as this does not help his neck pain. Denies persistent fevers, chills, weight changes, neurogenic bowel or bladder symptoms. Pt denies recent ED visits or hospitalizations. 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 Consults with Dr. Boo Ferro for his foot. Pain Assessment  10/16/2017   Location of Pain Neck;Leg;Back   Location Modifiers -   Severity of Pain 4   Quality of Pain (No Data)   Quality of Pain Comment tingling   Duration of Pain -   Frequency of Pain Constant   Aggravating Factors Standing;Walking   Aggravating Factors Comment lifting, lying, changing positions   Limiting Behavior -   Relieving Factors -   Result of Injury -   Work-Related Injury -   Type of Injury -         XR TIB/FIB LEFT 9/11/17 from Dr. France Monsalve office:  IMPRESSION:  Two views - No fracture, no. PAST MEDICAL HISTORY   Past Medical History:   Diagnosis Date    Torn rotator cuff 08/2017    left rotator cuff tear       Past Surgical History:   Procedure Laterality Date    HAND/FINGER SURGERY UNLISTED      HX KNEE ARTHROSCOPY  1995    right knee    HX OTHER SURGICAL  2000    finger surgery, right 3rd finger. Reattach tendons    HX ROTATOR CUFF REPAIR  08/22/2017    left       MEDICATIONS    Current Outpatient Prescriptions   Medication Sig Dispense Refill    raNITIdine (ZANTAC 75) 75 mg tablet Take 75 mg by mouth nightly. Indications: HEARTBURN      naproxen sodium (ALEVE) 220 mg cap Take  by mouth.  oxyCODONE IR (ROXICODONE) 5 mg immediate release tablet Take 1-3 Tabs by mouth every four (4) hours as needed. Max Daily Amount: 90 mg.  Indications: Pain 44 Tab 0       ALLERGIES  No Known Allergies       SOCIAL HISTORY    Social History     Social History    Marital status: SINGLE     Spouse name: N/A    Number of children: N/A    Years of education: N/A     Occupational History    Not on file. Social History Main Topics    Smoking status: Never Smoker    Smokeless tobacco: Never Used      Comment: pt counsled to continue to not smoke.  Alcohol use Yes      Comment: occasionally    Drug use: No    Sexual activity: Not on file     Other Topics Concern    Not on file     Social History Narrative       FAMILY HISTORY  Family History   Problem Relation Age of Onset    Cancer Father      colon ca at age 77 yo    Diabetes Sister     Cancer Brother          REVIEW OF SYSTEMS  Review of Systems   Constitutional: Negative for chills, fever and weight loss. Respiratory: Negative for shortness of breath. Cardiovascular: Negative for chest pain. Gastrointestinal: Negative for constipation. Negative for fecal incontinence   Genitourinary: Negative for dysuria. Negative for urinary incontinence   Musculoskeletal:        Per HPI   Skin: Negative for rash. Neurological: Negative for dizziness, tingling, tremors, focal weakness and headaches. Endo/Heme/Allergies: Does not bruise/bleed easily. Psychiatric/Behavioral: The patient does not have insomnia. PHYSICAL EXAMINATION  Visit Vitals    BP (!) 138/94    Pulse 79    Temp 98.2 °F (36.8 °C) (Oral)    Resp 18    Ht 6' 2\" (1.88 m)    Wt 286 lb 3.2 oz (129.8 kg)    SpO2 97%    BMI 36.75 kg/m2          Accompanied by spouse. Constitutional:  Well developed, well nourished, in no acute distress. Psychiatric: Affect and mood are appropriate. Integumentary: No rashes or abrasions noted on exposed areas. Cardiovascular/Peripheral Vascular: Intact l pulses. No peripheral edema is noted. Lymphatic:  No evidence of lymphedema. No cervical lymphadenopathy. SPINE/MUSCULOSKELETAL EXAM    Cervical spine:  Neck is midline. Normal muscle tone.  No focal atrophy is noted. Shoulder ROM intact. Tenderness to palpation RT upper trapezius. Negative Spurling's sign. Negative Tinel's sign. Negative Lynne's sign. Sensation grossly intact to light touch. Lumbar spine:  No rash, ecchymosis, or gross obliquity. No fasciculations. No focal atrophy is noted. No tenderness to palpation at the sciatic notch. SI joints non-tender. Trochanters non tender. Sensation grossly intact to light touch. MOTOR:      Biceps  Triceps Deltoids Wrist Ext Wrist Flex Hand Intrin   Right +4/5 +4/5 +4/5 +4/5 +4/5 +4/5   Left +4/5 +4/5 +4/5 +4/5 +4/5 +4/5      Hip Flex Quads Hamstrings Ankle DF EHL Ankle PF   Right +4/5 +4/5 +4/5 +4/5 +4/5 +4/5   Left +4/5 +4/5 +4/5 +4/5 +4/5 +4/5       Straight Leg raise negative. Mild difficulty with tandem gait. Heel walk intact. Toe rise intact. Single leg toe rise intact. Ambulation without assistive device. FWB. RADIOGRAPHS  Cervical spine xray films reviewed:  1) Disc space narrowing at C6-7    Lumbar spine xray films reviewed:  1) Facet changes at L4-5 and L5-S1      Written by Devin Villanueva, as dictated by Otto Goldsmith MD.    I, Dr. Otto Goldsmith MD, confirm that all documentation is accurate. Mr. Mo Baum may have a reminder for a \"due or due soon\" health maintenance. I have asked that he contact his primary care provider for follow-up on this health maintenance.

## 2017-10-20 ENCOUNTER — TELEPHONE (OUTPATIENT)
Dept: ORTHOPEDIC SURGERY | Age: 52
End: 2017-10-20

## 2017-10-31 ENCOUNTER — TELEPHONE (OUTPATIENT)
Dept: ORTHOPEDIC SURGERY | Facility: CLINIC | Age: 52
End: 2017-10-31

## 2017-10-31 DIAGNOSIS — Z98.890 S/P LEFT ROTATOR CUFF REPAIR: Primary | ICD-10-CM

## 2017-10-31 NOTE — TELEPHONE ENCOUNTER
JULIANNE FROM [facility] CALLED FOR . JULIANNE SAID THAT THE PATIENT PHYSICAL THERAPY HAS ENDED. THAT THE PATIENT HAS NOT HAD ANY THERAPY FOR A WEEK OR TWO. JULIANNE WOULD LIKE A NEW ORDER FAXED TO [facility]   TEL. [phone]. FAX# [phone]. JULIANNE FROM [facility]   TEL. [phone].

## 2017-11-06 ENCOUNTER — OFFICE VISIT (OUTPATIENT)
Dept: ORTHOPEDIC SURGERY | Age: 52
End: 2017-11-06

## 2017-11-06 VITALS
OXYGEN SATURATION: 91 % | SYSTOLIC BLOOD PRESSURE: 122 MMHG | WEIGHT: 285.2 LBS | DIASTOLIC BLOOD PRESSURE: 85 MMHG | HEART RATE: 96 BPM | HEIGHT: 74 IN | TEMPERATURE: 97.7 F | RESPIRATION RATE: 18 BRPM | BODY MASS INDEX: 36.6 KG/M2

## 2017-11-06 DIAGNOSIS — M77.42 METATARSALGIA OF LEFT FOOT: Primary | ICD-10-CM

## 2017-11-06 DIAGNOSIS — G89.29 CHRONIC PAIN OF LEFT ANKLE: ICD-10-CM

## 2017-11-06 DIAGNOSIS — M25.572 CHRONIC PAIN OF LEFT ANKLE: ICD-10-CM

## 2017-11-06 DIAGNOSIS — G57.62 MORTON'S NEUROMA OF LEFT FOOT: ICD-10-CM

## 2017-11-06 NOTE — PROGRESS NOTES
AMBULATORY PROGRESS NOTE      Patient: Nhoemi Mccallum             MRN: [id number]     SSN: [id number] Body mass index is 36.62 kg/(m^2). YOB: 1965                AGE: 50 y.o. SEX: male    PCP: Shun Vora MD    IMPRESSION/DIAGNOSIS AND TREATMENT PLAN     DIAGNOSES  1. Metatarsalgia of left foot    2. Ponce's neuroma of left foot    3. Chronic pain of left ankle        No orders of the defined types were placed in this encounter. Nohemi Mccallum understands his diagnoses and the proposed plan. Continue to wear custom orthotic with supportive shoe  Tubigrip provided for swelling  Follow up as needed      HPI AND EXAMINATION     Nohemi Mccallum IS A 50 y.o. male who presents to my outpatient office for follow up of left foot pain due to Ponce's neuroma. At last visit, I ordered a custom trilaminar orthotic. The patient presents to the office today for follow up. Patient states that the orthotic has taken away all of the pain from the Ponce's neuroma. He states that he has some pain along lateral hindfoot on occasion. He states that he could not wear the red wing boots due to pain. GEN:  Alert, well developed, well nourished, well appearing 50 y. o. male in no acute distress. PSYCH:  Normal affect, mood, and conduct. alert, oriented x 3 alert, oriented x 3, no dementia  M/S EXAMINATION OF: left foot and ankle  SKIN: mild edema, no erythema, no ecchymosis, no warmth  TENDERNESS:  mild tenderness to palpation to plantar fascia (medial and lateral) and no tenderness to 1st and 2nd webspace                                                                 plantarward  NEUROVASCULAR:  grossly intact. Positive distal pulses and capillary refill. DVT ASSESSMENT:  The calf is not tender to palpation.  No evidence of DVT seen on physical exam.  ROM:  Within normal limits     CHART REVIEW     Past Medical History:   Diagnosis Date    Torn rotator cuff 08/2017    left rotator cuff tear     Current Outpatient Prescriptions   Medication Sig    gabapentin (NEURONTIN) 300 mg capsule 1 tab PO QHS for 3 nights then increase to BID, disp    naproxen sodium (ALEVE) 220 mg cap Take  by mouth.  raNITIdine (ZANTAC 75) 75 mg tablet Take 75 mg by mouth nightly. Indications: HEARTBURN    oxyCODONE IR (ROXICODONE) 5 mg immediate release tablet Take 1-3 Tabs by mouth every four (4) hours as needed. Max Daily Amount: 90 mg. Indications: Pain     No current facility-administered medications for this visit. No Known Allergies  Past Surgical History:   Procedure Laterality Date    HAND/FINGER SURGERY UNLISTED      HX KNEE ARTHROSCOPY  1995    right knee    HX OTHER SURGICAL  2000    finger surgery, right 3rd finger. Reattach tendons    HX ROTATOR CUFF REPAIR  08/22/2017    left     Social History     Occupational History    Not on file. Social History Main Topics    Smoking status: Never Smoker    Smokeless tobacco: Never Used      Comment: pt counsled to continue to not smoke.  Alcohol use Yes      Comment: occasionally    Drug use: No    Sexual activity: Not on file     Family History   Problem Relation Age of Onset    Cancer Father      colon ca at age 75 yo    Diabetes Sister     Cancer Brother        REVIEW OF SYSTEMS : 11/6/2017  ALL BELOW ARE Negative except : SEE HPI       Constitutional: Negative for fever, chills and weight loss. Neg Weigh Loss  Cardiovascular: Negative for chest pain, claudication and leg swelling. SOB, LAM   Gastrointestinal: Negative for  pain, N/V/D/C, Blood in stool or urine,dysuria, hematuria,        Incontinence, pelvic pain  Musculoskeletal: see HPI. Neurological: Negative for dizziness and weakness. Negative for headaches,Visual Changes, Confusion, Seizures,   Psychiatric/Behavioral: Negative for depression, memory loss and substance abuse.    Extremities:  Negative for  hair changes, rash or skin lesion changes. Hematologic: Negative for Bleeding problems, bruising, pallor or swollen lymph nodes.   Peripheral Vascular: No calf pain, vascular vein tenderness to calf pain              No calf throbbing, posterior knee throbbing pain    DIAGNOSTIC IMAGING     No notes on file

## 2017-11-06 NOTE — MR AVS SNAPSHOT
Visit Information Date & Time Provider Department Dept. Phone Encounter #  
 11/6/2017  3:15 PM Nikita Ocasio, 27 [facility] [phone] Follow-up Instructions Return if symptoms worsen or fail to improve. Your Appointments 11/8/2017  3:30 PM  
DIAG TEST F/U with Virgie Cortez MD  
[facility] ([address]) Appt Note: MRI F/U BRING DISC  WC  
 [address] [facility] [address]  
[phone]  
  
   
 [address] [facility],[address]  
  
    
 11/13/2017  4:30 PM  
Follow Up with Rose Mars PA-C  
[facility] [address] [facility]) Appt Note: /  
 [address], [address] [place] [address] [phone]  
  
   
 [address], [address] [facility] [address] Upcoming Health Maintenance Date Due FOBT Q 1 YEAR AGE 50-75 1/8/2017 INFLUENZA AGE 9 TO ADULT 8/1/2017 DTaP/Tdap/Td series (2 - Td) 7/25/2027 Allergies as of 11/6/2017  Review Complete On: 10/16/2017 By: [address], MD  
 No Known Allergies Current Immunizations  Never Reviewed No immunizations on file. Not reviewed this visit You Were Diagnosed With   
  
 Codes Comments Metatarsalgia of left foot    -  Primary ICD-10-CM: M77.42 
ICD-9-CM: 726.70 Ponce's neuroma of left foot     ICD-10-CM: G57.62 
ICD-9-CM: 357. 6 Chronic pain of left ankle     ICD-10-CM: M25.572, G89.29 ICD-9-CM: 719.47, 338.29 Vitals BP Pulse Temp Resp Height(growth percentile) Weight(growth percentile) 122/85 (BP 1 Location: Left arm, BP Patient Position: Sitting) 96 97.7 °F (36.5 °C) (Oral) 18 6' 2\" (1.88 m) 285 lb 3.2 oz (129.4 kg) SpO2 BMI Smoking Status 91% 36.62 kg/m2 Never Smoker Vitals History BMI and BSA Data Body Mass Index Body Surface Area 36.62 kg/m 2 2.6 m 2 Preferred Pharmacy Pharmacy Name Phone [facility], [address] [phone] Your Updated Medication List  
  
   
This list is accurate as of: 11/6/17  4:55 PM.  Always use your most recent med list.  
  
  
  
  
 ALEVE 220 mg Cap Generic drug:  naproxen sodium Take  by mouth.  
  
 gabapentin 300 mg capsule Commonly known as:  NEURONTIN  
1 tab PO QHS for 3 nights then increase to BID, disp  
  
 oxyCODONE IR 5 mg immediate release tablet Commonly known as:  Kathleen Rice Take 1-3 Tabs by mouth every four (4) hours as needed. Max Daily Amount: 90 mg. Indications: Pain ZANTAC 75 75 mg tablet Generic drug:  raNITIdine Take 75 mg by mouth nightly. Indications: HEARTBURN Follow-up Instructions Return if symptoms worsen or fail to improve. Patient Instructions Continue to wear custom orthotic with supportive shoe Tubigrip provided for swelling Follow up as needed Ponce's Neuroma: Care Instructions Your Care Instructions When your toes are squeezed together, often over a period of months or even years, the nerve that runs between the toes can swell and get thicker. This is called a Ponce's neuroma. It may feel like a small lump is pushing inside the ball of your foot. When you walk or move your toes, you feel pain that sometimes moves into your toes. If the pressure continues, it may damage the nerve. If you catch the problem early and change your shoes, the nerve swelling may go away. Your doctor may advise you to wear wide-toed shoes. He or she also may suggest that you ice the sore spot and limit activities that put pressure on the nerve. If these steps do not help your symptoms, your doctor may have you use special pads or devices that spread the toes. This keeps them from squeezing the nerve.  In some cases, you may get a cortisone shot to reduce swelling and pain. If these treatments don't help, your doctor may suggest surgery to relieve pressure or remove the swollen nerve. Follow-up care is a key part of your treatment and safety. Be sure to make and go to all appointments, and call your doctor if you are having problems. It's also a good idea to know your test results and keep a list of the medicines you take. How can you care for yourself at home? · Ask your doctor if you can take an over-the-counter pain medicine, such as acetaminophen (Tylenol), ibuprofen (Advil, Motrin), or naproxen (Aleve). Be safe with medicines. Read and follow all instructions on the label. · Try to stay off your feet as much as possible until the pain and swelling go away. · Avoid wearing tight, pointy, or high-heeled shoes. Instead, wear roomy footwear. · Put ice or a cold pack on the area for 10 to 20 minutes at a time. Put a thin cloth between the ice and your skin. · Try massaging your feet. This relaxes the muscles around the nerve. · If your doctor prescribed special pads or a device to relieve pressure on your toes, use these items as directed. · Until all pain and swelling go away, avoid activities that put pressure on the toes. These include racquet sports and running. When should you call for help? Watch closely for changes in your health, and be sure to contact your doctor if: 
? · You do not get better as expected. Where can you learn more? Go to http://sharath-hank.info/. Enter E100 in the search box to learn more about \"Ponce's Neuroma: Care Instructions. \" Current as of: March 21, 2017 Content Version: 11.4 © 9958-2865 [employer]. Care instructions adapted under license by [employer] (which disclaims liability or warranty for this information).  If you have questions about a medical condition or this instruction, always ask your healthcare professional. Horatio Habermann[employer] disclaims any warranty or liability for your use of this information. Introducing [facility] [place] & HEALTH SERVICES! [employer] introduces CarWoo! patient portal. Now you can access parts of your medical record, email your doctor's office, and request medication refills online. 1. In your internet browser, go to [email or web address] 2. Click on the First Time User? Click Here link in the Sign In box. You will see the New Member Sign Up page. 3. Enter your CarWoo! Access Code exactly as it appears below. You will not need to use this code after youve completed the sign-up process. If you do not sign up before the expiration date, you must request a new code. · CarWoo! Access Code: [email or web address] Expires: 1/30/2018  8:22 AM 
 
4. Enter the last four digits of your Social Security Number (xxxx) and Date of Birth (mm/dd/yyyy) as indicated and click Submit. You will be taken to the next sign-up page. 5. Create a CarWoo! ID. This will be your CarWoo! login ID and cannot be changed, so think of one that is secure and easy to remember. 6. Create a CarWoo! password. You can change your password at any time. 7. Enter your Password Reset Question and Answer. This can be used at a later time if you forget your password. 8. Enter your e-mail address. You will receive e-mail notification when new information is available in [address] 9. Click Sign Up. You can now view and download portions of your medical record. 10. Click the Download Summary menu link to download a portable copy of your medical information. If you have questions, please visit the Frequently Asked Questions section of the CarWoo! website. Remember, CarWoo! is NOT to be used for urgent needs. For medical emergencies, dial 911. Now available from your iPhone and Android! Please provide this summary of care documentation to your next provider. Your primary care clinician is listed as Ubaldo [place]. If you have any questions after today's visit, please call [phone].

## 2017-11-06 NOTE — PATIENT INSTRUCTIONS
Continue to wear custom orthotic with supportive shoe  Tubigrip provided for swelling  Follow up as needed         Kris's Neuroma: Care Instructions  Your Care Instructions  When your toes are squeezed together, often over a period of months or even years, the nerve that runs between the toes can swell and get thicker. This is called a Ponce's neuroma. It may feel like a small lump is pushing inside the ball of your foot. When you walk or move your toes, you feel pain that sometimes moves into your toes. If the pressure continues, it may damage the nerve. If you catch the problem early and change your shoes, the nerve swelling may go away. Your doctor may advise you to wear wide-toed shoes. He or she also may suggest that you ice the sore spot and limit activities that put pressure on the nerve. If these steps do not help your symptoms, your doctor may have you use special pads or devices that spread the toes. This keeps them from squeezing the nerve. In some cases, you may get a cortisone shot to reduce swelling and pain. If these treatments don't help, your doctor may suggest surgery to relieve pressure or remove the swollen nerve. Follow-up care is a key part of your treatment and safety. Be sure to make and go to all appointments, and call your doctor if you are having problems. It's also a good idea to know your test results and keep a list of the medicines you take. How can you care for yourself at home? · Ask your doctor if you can take an over-the-counter pain medicine, such as acetaminophen (Tylenol), ibuprofen (Advil, Motrin), or naproxen (Aleve). Be safe with medicines. Read and follow all instructions on the label. · Try to stay off your feet as much as possible until the pain and swelling go away. · Avoid wearing tight, pointy, or high-heeled shoes. Instead, wear roomy footwear. · Put ice or a cold pack on the area for 10 to 20 minutes at a time.  Put a thin cloth between the ice and your skin. · Try massaging your feet. This relaxes the muscles around the nerve. · If your doctor prescribed special pads or a device to relieve pressure on your toes, use these items as directed. · Until all pain and swelling go away, avoid activities that put pressure on the toes. These include racquet sports and running. When should you call for help? Watch closely for changes in your health, and be sure to contact your doctor if:  ? · You do not get better as expected. Where can you learn more? Go to http://sharath-hank.info/. Enter E100 in the search box to learn more about \"Ponce's Neuroma: Care Instructions. \"  Current as of: March 21, 2017  Content Version: 11.4  © 1635-2785 [employer]air[employer]. Care instructions adapted under license by [employer] (which disclaims liability or warranty for this information). If you have questions about a medical condition or this instruction, always ask your healthcare professional. [place] [address] any warranty or liability for your use of this information.

## 2017-11-08 ENCOUNTER — OFFICE VISIT (OUTPATIENT)
Dept: ORTHOPEDIC SURGERY | Age: 52
End: 2017-11-08

## 2017-11-08 VITALS
BODY MASS INDEX: 36.86 KG/M2 | WEIGHT: 287.2 LBS | RESPIRATION RATE: 18 BRPM | TEMPERATURE: 98.2 F | DIASTOLIC BLOOD PRESSURE: 75 MMHG | SYSTOLIC BLOOD PRESSURE: 123 MMHG | HEIGHT: 74 IN | OXYGEN SATURATION: 95 % | HEART RATE: 88 BPM

## 2017-11-08 DIAGNOSIS — M48.02 CERVICAL SPINAL STENOSIS: Primary | ICD-10-CM

## 2017-11-08 RX ORDER — GABAPENTIN 300 MG/1
CAPSULE ORAL
Qty: 90 CAP | Refills: 2 | Status: SHIPPED | OUTPATIENT
Start: 2017-11-08 | End: 2018-02-19 | Stop reason: SDUPTHER

## 2017-11-08 NOTE — PROGRESS NOTES
Curly Childs [facility] [[email or web address]] [address].  [address]  [facility], [address]  Phone: [phone]  Fax: [phone]        Fabiana Fenton  : 1965  PCP: Ras Durán MD    PROGRESS NOTE      ASSESSMENT AND PLAN    Diagnoses and all orders for this visit:    1. Cervical spinal stenosis    Other orders  -     gabapentin (NEURONTIN) 300 mg capsule; 300  QPM    1. Advised to continue HEP. 2. Increase Gabapentin to 600 mg QHS. Will maintain on Gabapentin for several months, then do trial of tapering. 3. Avoid any overhead activity. 4. Discussed urgent symptoms with pt. Follow-up Disposition:  Return in about 2 months (around 2018). HISTORY OF PRESENT ILLNESS  Lisa Kilgore is a 50 y.o. male. Pt presents to the office for a f/u visit for neck pain. Last visit pt was sent to have a cervical spine MRI. Images reviewed with the pt. Last visit he was given a trial of Gabapentin. He has relief with Gabapentin, no negative side effects. He states that during the day he no longer has much pain or paresthesias. His symptoms are mainly felt at night. His neck pain radiates into his shoulders and biceps. Pt reports pain does radiate into the rest of his arms. Pt has been having pain since 19 after he fell off of an 8 foot ladder (fell about 3-4 feet) and onto the concrete. He has a Hx of pinched nerve in his neck radiating into his RUE. He denies any numbness in his hands. He does overhead activity. Pt does not have weakness in his BUE. Pt states he has been using Gabapentin 300 mg QHS with relief. Has not had physical therapy for his neck. Denies persistent fevers, chills, weight changes, neurogenic bowel or bladder symptoms. Pt denies recent ED visits or hospitalizations. 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 Consults with Dr. Radha Perdomo for his foot.      Cervical spine MRI preliminary read: Multilevel cervical canal narrowing with disc osteophyte complexes. IMPRESSION  1. Multilevel Degenerative changes, most pronounced at C3-C7  2. Moderate canal stenosis at C4-5.   3. Moderate to severe canal stenosis at C5-6  4. Mild to moderate canal stenosis at C6-7  5. Multilevel moderate to high-grade neural foraminal stenosis as described. Pain Assessment  11/8/2017   Location of Pain Neck; Other (comment)   Pain Location Comment 5/10 at night   Location Modifiers -   Severity of Pain 0   Quality of Pain Other (Comment)   Quality of Pain Comment spasm/tightnes in both shoulders at night   Duration of Pain A few minutes   Frequency of Pain Intermittent   Aggravating Factors Other (Comment)   Aggravating Factors Comment positioning   Limiting Behavior -   Relieving Factors Other (Comment)   Relieving Factors Comment repositioning to the left side   Result of Injury No   Work-Related Injury -   Type of Injury -           PAST MEDICAL HISTORY   Past Medical History:   Diagnosis Date    Torn rotator cuff 08/2017    left rotator cuff tear       Past Surgical History:   Procedure Laterality Date    HAND/FINGER SURGERY UNLISTED      HX KNEE ARTHROSCOPY  1995    right knee    HX OTHER SURGICAL  2000    finger surgery, right 3rd finger. Reattach tendons    HX ROTATOR CUFF REPAIR  08/22/2017    left   . MEDICATIONS    Current Outpatient Prescriptions   Medication Sig Dispense Refill    gabapentin (NEURONTIN) 300 mg capsule 1 tab PO QHS for 3 nights then increase to BID, disp 60 Cap 1    naproxen sodium (ALEVE) 220 mg cap Take  by mouth.  raNITIdine (ZANTAC 75) 75 mg tablet Take 75 mg by mouth nightly. Indications: HEARTBURN          ALLERGIES  No Known Allergies       SOCIAL HISTORY    Social History     Social History    Marital status: SINGLE     Spouse name: N/A    Number of children: N/A    Years of education: N/A     Occupational History    Not on file.      Social History Main Topics    Smoking status: Never Smoker    Smokeless tobacco: Never Used      Comment: pt counsled to continue to not smoke.  Alcohol use Yes      Comment: occasionally    Drug use: No    Sexual activity: Not on file     Other Topics Concern    Not on file     Social History Narrative       FAMILY HISTORY  Family History   Problem Relation Age of Onset    Cancer Father      colon ca at age 75 yo    Diabetes Sister     Cancer Brother        REVIEW OF SYSTEMS  Review of Systems   Constitutional: Negative for chills, fever and weight loss. Respiratory: Negative for shortness of breath. Cardiovascular: Negative for chest pain. Gastrointestinal: Negative for constipation. Negative for fecal incontinence   Genitourinary: Negative for dysuria. Negative for urinary incontinence   Musculoskeletal:        Per HPI   Skin: Negative for rash. Neurological: Negative for dizziness, tingling, tremors, focal weakness and headaches. Endo/Heme/Allergies: Does not bruise/bleed easily. Psychiatric/Behavioral: The patient does not have insomnia. PHYSICAL EXAMINATION  Visit Vitals    /75    Pulse 88    Temp 98.2 °F (36.8 °C) (Oral)    Resp 18    Ht 6' 2\" (1.88 m)    Wt 287 lb 3.2 oz (130.3 kg)    SpO2 95%    BMI 36.87 kg/m2         Accompanied by spouse. Constitutional:  Well developed, well nourished, in no acute distress. Psychiatric: Affect and mood are appropriate. Integumentary: No rashes or abrasions noted on exposed areas. Cardiovascular/Peripheral Vascular: Intact l pulses. No peripheral edema is noted. Lymphatic:  No evidence of lymphedema. No cervical lymphadenopathy. SPINE/MUSCULOSKELETAL EXAM    Cervical spine:  Neck is midline. Normal muscle tone. No focal atrophy is noted. Shoulder ROM intact. Negative Spurling's sign. Negative Tinel's sign. Negative Lynne's sign. Sensation grossly intact to light touch.        MOTOR:      Biceps  Triceps Deltoids Wrist Ext Wrist Flex Hand Intrin   Right +4/5 +4/5 +4/5 +4/5 +4/5 +4/5   Left +4/5 +4/5 +4/5 +4/5 +4/5 +4/5     DTRs are 1+ biceps, triceps, brachioradialis      Ambulation without assistive device. FWB. Written by Veronica Cody, as dictated by Lamar Carlos MD.    I, Dr. Lamar Carlos MD, confirm that all documentation is accurate. Mr. Miguel Truong may have a reminder for a \"due or due soon\" health maintenance. I have asked that he contact his primary care provider for follow-up on this health maintenance.

## 2017-11-08 NOTE — PROGRESS NOTES
VORB ENTERED PER DR. Sharon Timmons AS DOCUMENTED ON BLUE SHEET: Gabapentin 300 mg 300  QPM Disp 90 2RF

## 2017-11-13 ENCOUNTER — OFFICE VISIT (OUTPATIENT)
Dept: ORTHOPEDIC SURGERY | Facility: CLINIC | Age: 52
End: 2017-11-13

## 2017-11-13 VITALS
SYSTOLIC BLOOD PRESSURE: 141 MMHG | HEART RATE: 67 BPM | BODY MASS INDEX: 36.93 KG/M2 | OXYGEN SATURATION: 97 % | WEIGHT: 287.8 LBS | HEIGHT: 74 IN | DIASTOLIC BLOOD PRESSURE: 85 MMHG | TEMPERATURE: 96.9 F

## 2017-11-13 DIAGNOSIS — Z98.890 S/P ROTATOR CUFF REPAIR: Primary | ICD-10-CM

## 2017-11-13 NOTE — MR AVS SNAPSHOT
Visit Information Date & Time Provider Department Dept. Phone Encounter #  
 11/13/2017  4:30 PM Rose Mars, [address] [facility] - [facility] [phone] Your Appointments 11/13/2017  4:30 PM  
Follow Up with Rose Mars PA-C  
[facility]) Appt Note: 1 mo lt shoulder pain f/u, adv HS office; pt r/s 11:00 appt time [address] [facility], [address] Marg[address]ita Merchant  
[phone]  
  
   
 [address] [facility], [address] [address]  
  
    
 1/8/2018  8:30 AM  
Follow Up with Aline Collins MD  
[facility]) Appt Note: NECK 2 MO FU  
 [address] [facility] [address] [phone]  
  
   
 [address] [facility] [address] Upcoming Health Maintenance Date Due FOBT Q 1 YEAR AGE 50-75 1/8/2017 Influenza Age 5 to Adult 8/1/2017 DTaP/Tdap/Td series (2 - Td) 7/25/2027 Allergies as of 11/13/2017  Review Complete On: 11/13/2017 By: Rose Mars PA-C No Known Allergies Current Immunizations  Never Reviewed No immunizations on file. Not reviewed this visit You Were Diagnosed With   
  
 Codes Comments S/P rotator cuff repair    -  Primary ICD-10-CM: Q32.458 ICD-9-CM: V45.89 Vitals BP Pulse Temp Height(growth percentile) Weight(growth percentile) SpO2  
 141/85 67 96.9 °F (36.1 °C) (Oral) 6' 2\" (1.88 m) 287 lb 12.8 oz (130.5 kg) 97% BMI Smoking Status 36.95 kg/m2 Never Smoker BMI and BSA Data Body Mass Index Body Surface Area  
 36.95 kg/m 2 2.61 m 2 Preferred Pharmacy Pharmacy Name Phone [facility], [address] [facility] [address] [facility] [phone] Your Updated Medication List  
  
   
 This list is accurate as of: 11/13/17  4:26 PM.  Always use your most recent med list.  
  
  
  
  
 ALEVE 220 mg Cap Generic drug:  naproxen sodium Take  by mouth. * gabapentin 300 mg capsule Commonly known as:  NEURONTIN  
1 tab PO QHS for 3 nights then increase to BID, disp * gabapentin 300 mg capsule Commonly known as:  NEURONTIN  
300  QPM  
  
 ZANTAC 75 75 mg tablet Generic drug:  raNITIdine Take 75 mg by mouth nightly. Indications: HEARTBURN  
  
 * Notice: This list has 2 medication(s) that are the same as other medications prescribed for you. Read the directions carefully, and ask your doctor or other care provider to review them with you. Introducing [facility] & HEALTH SERVICES! [facility] introduces Smalldeals patient portal. Now you can access parts of your medical record, email your doctor's office, and request medication refills online. 1. In your internet browser, go to [email or web address] 2. Click on the First Time User? Click Here link in the Sign In box. You will see the New Member Sign Up page. 3. Enter your Smalldeals Access Code exactly as it appears below. You will not need to use this code after youve completed the sign-up process. If you do not sign up before the expiration date, you must request a new code. · Smalldeals Access Code: [email or web address] Expires: 1/30/2018  8:22 AM 
 
4. Enter the last four digits of your Social Security Number (xxxx) and Date of Birth (mm/dd/yyyy) as indicated and click Submit. You will be taken to the next sign-up page. 5. Create a Smalldeals ID. This will be your Smalldeals login ID and cannot be changed, so think of one that is secure and easy to remember. 6. Create a Smalldeals password. You can change your password at any time. 7. Enter your Password Reset Question and Answer. This can be used at a later time if you forget your password. 8. Enter your e-mail address. You will receive e-mail notification when new information is available in [address] 9. Click Sign Up. You can now view and download portions of your medical record. 10. Click the Download Summary menu link to download a portable copy of your medical information. If you have questions, please visit the Frequently Asked Questions section of the Marketo website. Remember, Marketo is NOT to be used for urgent needs. For medical emergencies, dial 911. Now available from your iPhone and Android! Please provide this summary of care documentation to your next provider. Your primary care clinician is listed as Ubaldo [place]. If you have any questions after today's visit, please call [phone].

## 2017-11-13 NOTE — LETTER
NOTIFICATION RETURN TO WORK / SCHOOL 
 
11/13/2017 4:23 PM 
 
Mr. Neri Allen [place] [id number] To Whom It May Concern: 
 
Neri Allen is currently under the care of [address] [place] [address]. He is to return to work, full duty and no restrictions on December 1st 2017. If there are questions or concerns please have the patient contact our office.  
 
 
 
Sincerely, 
 
 
Lei Nageotte, PA-C

## 2017-11-13 NOTE — PROGRESS NOTES
HISTORY OF PRESENT ILLNESS:  Jassi Gomez returns. He is 10 weeks status post his left shoulder open rotator cuff repair. He is doing extremely well today. He has continued therapy through [address] His note is pending from his last therapy session. He has made excellent progress with his range of motion and has no pain to his shoulder today. He would like to return to work ASAP. 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 He does a fair amount of overhead work. He is confident that with his range of motion and no pain today, that he can perform his duties. PHYSICAL EXAM:  On examination, his shoulder incision is healed nicely. There is no evidence of wound dehiscence or infection. He has active forward flexion of 160° without pain. His internal rotation is 70° without pain. His deltoid, biceps, and triceps are nicely conditioned to 55 against resistance on the left. His left elbow range of motion is 110-0°. PLAN:   He may return to work full duty on December 1, 2017. He is going to continue to finish out his physical therapy. I will review the therapy note once it arrives and change it appropriately. We will see him back in about two months. Today, all his questions were answered to he and his wifes satisfaction.

## 2017-11-16 ENCOUNTER — DOCUMENTATION ONLY (OUTPATIENT)
Dept: ORTHOPEDIC SURGERY | Age: 52
End: 2017-11-16

## 2017-11-16 NOTE — PROGRESS NOTES
Faxed ovnote 11/13/17 & worknote/letter 11/13/17 as requested by maggi mendez/laura sanchez/m ms wolf chang  Fax# provided and sent to  [phone]

## 2017-11-29 DIAGNOSIS — M54.2 NONSPECIFIC PAIN IN THE NECK REGION: ICD-10-CM

## 2018-01-19 ENCOUNTER — OFFICE VISIT (OUTPATIENT)
Dept: ORTHOPEDIC SURGERY | Facility: CLINIC | Age: 53
End: 2018-01-19

## 2018-01-19 VITALS
DIASTOLIC BLOOD PRESSURE: 84 MMHG | SYSTOLIC BLOOD PRESSURE: 130 MMHG | HEART RATE: 86 BPM | WEIGHT: 287 LBS | BODY MASS INDEX: 36.83 KG/M2 | OXYGEN SATURATION: 96 % | HEIGHT: 74 IN

## 2018-01-19 DIAGNOSIS — Z98.890 S/P ROTATOR CUFF REPAIR: Primary | ICD-10-CM

## 2018-01-19 DIAGNOSIS — M25.572 CHRONIC PAIN OF LEFT ANKLE: ICD-10-CM

## 2018-01-19 DIAGNOSIS — M77.42 METATARSALGIA OF LEFT FOOT: ICD-10-CM

## 2018-01-19 DIAGNOSIS — G57.62 MORTON'S NEUROMA OF LEFT FOOT: ICD-10-CM

## 2018-01-19 DIAGNOSIS — G89.29 CHRONIC PAIN OF LEFT ANKLE: ICD-10-CM

## 2018-01-19 NOTE — PROGRESS NOTES
HISTORY OF PRESENT ILLNESS:  Kris Ignacio returns five months status post his left open rotator cuff repair. He is doing very well. He has completed all of his outpatient organized physical therapy. He is continuing self-guided home exercises. He is pleased with the surgical outcome to date. He has gone back to work as of December 4, 2017. He still has some strength issues that he is working on. He has seen Dr. Alley Jordan, as recent as mid-October and then, again, Isidro Norman P.A.-C., in mid-November for left foot and ankle pain. He was found to have some sesamoiditis, as well as metatarsalgia and Ponces neuroma. He was ordered a custom insert, which helped the Opnces neuroma, but he has still been plagued with lateral left ankle pain with what he describes as some instability. PHYSICAL EXAM:  The ankle, today, on examination, reveals no fracture deformity, lesions, masses, or step-offs. He has no pain associated with the interdigit space of the thumb and the second toe, nor the second and third toe. Capillary refill is brisk and less than 2 seconds to the left lower extremity. Anterior Drawer sign is negative. He is somewhat guarded on inversion stressing. His surgical site is healed nicely over the proximal anterior portion of the left shoulder. His forward flexion today is noted at 150° actively against resistance. His glenohumeral abduction is 110°. Passive external rotation is 40° without pain. Internal rotation actively is easily to L1. Distal sensation is intact fully to the left upper extremity. Capillary refill is brisk and less than 2 seconds to the left upper extremity. PLAN:   He is going to follow back with Dr. Alley Jordan for his left foot. He does need a re-referral to see Dr. Alley Jordan. Regarding his left shoulder, he is doing very well, and he is going to continue to participate in a self-guided exercise program.  We are going to see him back on a prn basis.  He has had an MRI of his left foot and ankle, which, again, will be reviewed under the care of Dr. Rosie Cueto.

## 2018-01-22 ENCOUNTER — TELEPHONE (OUTPATIENT)
Dept: ORTHOPEDIC SURGERY | Facility: CLINIC | Age: 53
End: 2018-01-22

## 2018-01-29 ENCOUNTER — OFFICE VISIT (OUTPATIENT)
Dept: ORTHOPEDIC SURGERY | Facility: CLINIC | Age: 53
End: 2018-01-29

## 2018-01-29 VITALS
RESPIRATION RATE: 18 BRPM | HEART RATE: 83 BPM | SYSTOLIC BLOOD PRESSURE: 136 MMHG | DIASTOLIC BLOOD PRESSURE: 90 MMHG | OXYGEN SATURATION: 98 % | TEMPERATURE: 97.3 F | BODY MASS INDEX: 36.06 KG/M2 | HEIGHT: 74 IN | WEIGHT: 281 LBS

## 2018-01-29 DIAGNOSIS — M54.16 LUMBAR RADICULOPATHY: Primary | ICD-10-CM

## 2018-01-29 DIAGNOSIS — M79.672 LEFT FOOT PAIN: ICD-10-CM

## 2018-01-29 DIAGNOSIS — Z98.890 S/P ROTATOR CUFF REPAIR: ICD-10-CM

## 2018-01-29 DIAGNOSIS — M79.605 LEFT LEG PAIN: ICD-10-CM

## 2018-01-29 NOTE — PROGRESS NOTES
Patient: Sg Ridley                MRN: [id number]       SSN: [id number]  YOB: 1965        AGE: 48 y.o. SEX: male    PCP: Lis Ansari MD  01/29/18    Chief Complaint   Patient presents with    Leg Pain     Left    Foot Pain     Left     HISTORY:  Sg Ridley is a 48 y.o. male who is seen for radiating left foot and leg pain. He reports pain radiating down through his left leg his plantar lateral foot. He notes foot pain and swelling following the injury. He notes pain over the upper left lateral gastrocnemius area. He reportsed pain when wearing the short fracture walker on his left foot. He reports pain radiating into his left second and third toes. He notes pain with standing and walking. He was seen by Dr. Semaj Ch for his left foot pain and Dr. Indra Mars for low back pain. He reports increased left leg pain after climbing ladders at work. He was previously seen for left shoulder pain. He is s/p left rotator cuff repair on 8/22/17- doing well and now working full duty. He states that he fell off of an 8 ft. ladder onto a concrete surface on 7/23/17. He was seen at [facility] on 7/24/17. He states severe pain with ROM of his left shoulder. He notes increased shoulder pain at night. Mr Fermin Davis states that the doctor at the [facility] ED was concerned about a crack but x rays were not definitive. 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  He lives in [place] with his wife and mother in law. He has two grown step children living with them as well--25and 23years old. Current weight is 281 pounds. He is 6'2\" tall. He is not hypertensive or diabetic. No results found for: HBA1C, HGBE8, QIC6TRYE, IVB8OJLJ, FAQ0IVLY  Weight Metrics 1/29/2018 1/19/2018 11/13/2017 11/8/2017 11/6/2017 10/16/2017 10/13/2017   Weight 281 lb 287 lb 287 lb 12.8 oz 287 lb 3.2 oz 285 lb 3.2 oz 286 lb 3.2 oz 284 lb   BMI 36.08 kg/m2 36.85 kg/m2 36.95 kg/m2 36.87 kg/m2 36.62 kg/m2 36.75 kg/m2 36.46 kg/m2       Patient Active Problem List   Diagnosis Code    S/P rotator cuff repair Z98.890    Cervical spinal stenosis M48.02     REVIEW OF SYSTEMS: All Below are Negative except: See HPI   Constitutional: negative for fever, chills, and weight loss. Cardiovascular: negative for chest pain, claudication, leg swelling, SOB, LAM   Gastrointestinal: Negative for pain, N/V/C/D, Blood in stool or urine, dysuria,  hematuria, incontinence, pelvic pain. Musculoskeletal: See HPI   Neurological: Negative for dizziness and weakness. Negative for headaches, Visual changes, confusion, seizures   Phychiatric/Behavioral: Negative for depression, memory loss, substance  abuse. Extremities: Negative for hair changes, rash, or skin lesion changes. Hematologic: Negative for bleeding problems, bruising, pallor or swollen lymph  nodes   Peripheral Vascular: No calf pain, no circulation deficits. Social History     Social History    Marital status: SINGLE     Spouse name: N/A    Number of children: N/A    Years of education: N/A     Occupational History    Not on file. Social History Main Topics    Smoking status: Never Smoker    Smokeless tobacco: Never Used      Comment: pt counsled to continue to not smoke.      Alcohol use Yes      Comment: occasionally    Drug use: No    Sexual activity: Not on file     Other Topics Concern    Not on file     Social History Narrative      No Known Allergies Current Outpatient Prescriptions   Medication Sig    raNITIdine (ZANTAC 75) 75 mg tablet Take 75 mg by mouth nightly. Indications: HEARTBURN    gabapentin (NEURONTIN) 300 mg capsule 300  QPM    gabapentin (NEURONTIN) 300 mg capsule 1 tab PO QHS for 3 nights then increase to BID, disp    naproxen sodium (ALEVE) 220 mg cap Take  by mouth. No current facility-administered medications for this visit.        PHYSICAL EXAMINATION:  Visit Vitals    /90    Pulse 83    Temp 97.3 °F (36.3 °C) (Oral)    Resp 18    Ht 6' 2\" (1.88 m)    Wt 281 lb (127.5 kg)    SpO2 98%    BMI 36.08 kg/m2      PHYSICAL EXAM:  Visit Vitals    /90    Pulse 83    Temp 97.3 °F (36.3 °C) (Oral)    Resp 18    Ht 6' 2\" (1.88 m)    Wt 281 lb (127.5 kg)    SpO2 98%    BMI 36.08 kg/m2     ORTHO EXAMINATION:  Examination Right shoulder Left shoulder   Skin Intact Intact   Effusion - -   Biceps deformity - -   Atrophy - -   AC joint tenderness - +   Acromial tenderness + +, lateral   Biceps tenderness - -   Forward flexion/Elevation  30   Active abduction  30   External rotation ROM 30 0   Internal rotation ROM 70 70   Apprehension - -   Impingement - -   Drop Arm Test - -   Neurovascular Intact Intact   No defect over the pectoralis major tendon  Pain with any motion left shoulder  Examination Right knee Left knee   Skin Intact Intact   Range of motion 125-0 120-0   Effusion - -   Medial joint line tenderness - -   Lateral joint line tenderness - +   Popliteal tenderness - -   Osteophytes palpable - -   Alicias - -   Patella crepitus + +   Anterior drawer - -   Lateral laxity - -   Medial laxity - -   Varus deformity - -   Valgus deformity - -   Pretibial edema - 2-3+   Calf tenderness - -   Chuck sign negative  Calf is soft  L LE    Examination Right Ankle/Foot Left Ankle/Foot   Skin Intact  intact   Swelling - -   Dorsiflexion 10 5   Plantarflexion 25 15   Deformity - -   Inversion laxity - - Anterior drawer - -   Medial tenderness - +   Lateral tenderness - +   Heel cord Intact Intact   Sensation Intact Intact   Bunion - -   Toe nails Normal Normal   Capillary refill Normal Normal   Tenderness over calcaneofibular ligament of left ankle   Wearing a strap up ankle brace on his left ankle     MRI LEFT SHOULDER W CONT 8/3/17  IMPRESSION:   1. Complete tear of the supraspinatus tendon with retraction to the level of the [facility] joint. Edema within the muscle suggest acuity  2. Near complete tear of the infraspinatus tendon with retraction. 3. No significant loss of rotator cuff muscle bulk  4. Mild subscapularis tendinosis with no tear. 5. Labral degeneration with no detached tear. RADIOGRAPHS:  XR LEFT FOOT 9/11/17  IMPRESSION:  Three views - No fractures, no bunion deformity, no heel spur. XR TIB/FIB LEFT 9/11/17  IMPRESSION:  Two views - No fracture, no.      XR LEFT SHOULDER 7/24/17  IMPRESSION:  Three views - No fractures, mild acromioclavicular narrowing, no glenohumeral narrowing, no calcific densities. No dislocation. XR LEFT FOOT 7/24/17  IMPRESSION:  Three views - No fractures, no bunion deformity, no heel spur. Soft tissue swelling. IMPRESSION:      ICD-10-CM ICD-9-CM    1. Lumbar radiculopathy M54.16 724.4 REFERRAL TO SPINE SURGERY   2. S/P rotator cuff repair Z98.890 V45.89    3. Left foot pain M79.672 729.5 REFERRAL TO SPINE SURGERY   4. Left leg pain M79.605 729.5      PLAN:  He will follow up with Dr. Monica Sommer for his left foot pain. He will follow up at the spine center for his radiating pains into his left lower leg. His shoulder is doing well now 5 months post RCR for massive tear. There is no need for further surgery at this time. Continue shoulder exercises and full work activities.     Scribed by Milo Vargas ([address]) as dictated by Eduardo Arias MD

## 2018-01-29 NOTE — MR AVS SNAPSHOT
[address] 
 
 
 [address], [address] [facility] [address] 
[phone] Patient: Jennifer Larkin MRN: [id number] :1965 Visit Information Date & Time Provider Department Dept. Phone Encounter #  
 2018  3:50 PM Sung Johnson, [facility] [place] [phone] Follow-up Instructions Return if symptoms worsen or fail to improve. Your Appointments 2018  8:40 AM  
Follow Up with Priya Payne MD  
[facility] ([address] [place] [address]) Appt Note: W/C neck and back f/u; w/c adj wolf lunsford pt to this date/time (due to pt employ obligations) [address] [facility] [address]  
[phone]  
  
   
 [address] [facility] [address] Upcoming Health Maintenance Date Due FOBT Q 1 YEAR AGE 50-75 2017 Influenza Age 5 to Adult 2017 DTaP/Tdap/Td series (2 - Td) 2027 Allergies as of 2018  Review Complete On: 2018 By: Dorothy Pizano No Known Allergies Current Immunizations  Never Reviewed No immunizations on file. Not reviewed this visit You Were Diagnosed With   
  
 Codes Comments Lumbar radiculopathy    -  Primary ICD-10-CM: M54.16 
ICD-9-CM: 724.4 S/P rotator cuff repair     ICD-10-CM: M10.837 ICD-9-CM: V45.89 Left foot pain     ICD-10-CM: X70.743 ICD-9-CM: 729.5 Left leg pain     ICD-10-CM: M79.605 ICD-9-CM: 729.5 Vitals BP Pulse Temp Resp Height(growth percentile) Weight(growth percentile) 136/90 83 97.3 °F (36.3 °C) (Oral) 18 6' 2\" (1.88 m) 281 lb (127.5 kg) SpO2 BMI Smoking Status 98% 36.08 kg/m2 Never Smoker BMI and BSA Data Body Mass Index Body Surface Area 36.08 kg/m 2 2.58 m 2 Preferred Pharmacy Pharmacy Name Phone [facility] [address] [phone] Your Updated Medication List  
  
   
This list is accurate as of: 1/29/18  5:16 PM.  Always use your most recent med list.  
  
  
  
  
 ALEVE 220 mg Cap Generic drug:  naproxen sodium Take  by mouth. * gabapentin 300 mg capsule Commonly known as:  NEURONTIN  
1 tab PO QHS for 3 nights then increase to BID, disp * gabapentin 300 mg capsule Commonly known as:  NEURONTIN  
300  QPM  
  
 ZANTAC 75 75 mg tablet Generic drug:  raNITIdine Take 75 mg by mouth nightly. Indications: HEARTBURN  
  
 * Notice: This list has 2 medication(s) that are the same as other medications prescribed for you. Read the directions carefully, and ask your doctor or other care provider to review them with you. Follow-up Instructions Return if symptoms worsen or fail to improve. Introducing [facility]! Jose G Blum introduces [employer] patient portal. Now you can access parts of your medical record, email your doctor's office, and request medication refills online. 1. In your internet browser, go to [email or web address] "[employer]"/[email or web address] 2. Click on the First Time User? Click Here link in the Sign In box. You will see the New Member Sign Up page. 3. Enter your [employer] Access Code exactly as it appears below. You will not need to use this code after youve completed the sign-up process. If you do not sign up before the expiration date, you must request a new code. · [employer] Access Code: [email or web address] Expires: 1/30/2018  8:22 AM 
 
4. Enter the last four digits of your Social Security Number (xxxx) and Date of Birth (mm/dd/yyyy) as indicated and click Submit. You will be taken to the next sign-up page. 5. Create a [employer] ID. This will be your [employer] login ID and cannot be changed, so think of one that is secure and easy to remember. 6. Create a [employer]) password. You can change your password at any time. 7. Enter your Password Reset Question and Answer. This can be used at a later time if you forget your password. 8. Enter your e-mail address. You will receive e-mail notification when new information is available in [address] 9. Click Sign Up. You can now view and download portions of your medical record. 10. Click the Download Summary menu link to download a portable copy of your medical information. If you have questions, please visit the Frequently Asked Questions section of the [employer] website. Remember, [employer] ([employer]) is NOT to be used for urgent needs. For medical emergencies, dial 911. Now available from your iPhone and Android! Please provide this summary of care documentation to your next provider. Your primary care clinician is listed as Ubaldo [place]. If you have any questions after today's visit, please call [phone].

## 2018-01-31 ENCOUNTER — TELEPHONE (OUTPATIENT)
Dept: ORTHOPEDIC SURGERY | Facility: CLINIC | Age: 53
End: 2018-01-31

## 2018-01-31 NOTE — TELEPHONE ENCOUNTER
Danna Rosen with workers comp called in requesting office notes, return to work letter and also want to know if pt is to follow up with Manuel or if he has been released from his care. Please advise at [phone] fax # [phone].

## 2018-02-19 ENCOUNTER — OFFICE VISIT (OUTPATIENT)
Dept: ORTHOPEDIC SURGERY | Age: 53
End: 2018-02-19

## 2018-02-19 VITALS
HEART RATE: 73 BPM | BODY MASS INDEX: 36.4 KG/M2 | OXYGEN SATURATION: 97 % | TEMPERATURE: 98 F | SYSTOLIC BLOOD PRESSURE: 144 MMHG | RESPIRATION RATE: 17 BRPM | HEIGHT: 74 IN | DIASTOLIC BLOOD PRESSURE: 89 MMHG | WEIGHT: 283.6 LBS

## 2018-02-19 DIAGNOSIS — M48.02 CERVICAL SPINAL STENOSIS: Primary | ICD-10-CM

## 2018-02-19 DIAGNOSIS — M25.562 LEFT LATERAL KNEE PAIN: ICD-10-CM

## 2018-02-19 RX ORDER — GABAPENTIN 300 MG/1
CAPSULE ORAL
Qty: 90 CAP | Refills: 2 | Status: SHIPPED | OUTPATIENT
Start: 2018-02-19 | End: 2018-05-21 | Stop reason: SDUPTHER

## 2018-02-19 NOTE — PROGRESS NOTES
[facility] [address]  [address], [address]  [place], [facility] [address]  Phone: [phone]  Fax: [phone]        Washburnmelissa Noriega  : 1965  PCP: Yunior Weiss MD    PROGRESS NOTE      ASSESSMENT AND PLAN    Diagnoses and all orders for this visit:    1. Cervical spinal stenosis  -     gabapentin (NEURONTIN) 300 mg capsule; Take 1 tab PO QHS PRN for pain    2. Left lateral knee pain  -     REFERRAL TO ORTHOPEDICS    1. Advised to continue HEP. 2. Referral to Dr. Ofelia Moore for 6  Months of L knee pain post fall. 3. Rx of Gabapentin to take PRN QHS for cervico genic H/A. Should resolve over time. 4. If stable at next visit, may be released from specialty care. 5. Given upper back exercises. Discussed avoidance of overhead lifting given his stenosis. Follow-up Disposition:  Return in about 3 months (around 2018). Anticipate d/c from care at that time. HISTORY OF PRESENT ILLNESS  Robert Kang is a 48 y.o. male. Pt presents to the office for a f/u visit for neck pain and work related in  17 after he fell from an 8 foot ladder (fell about 3-4 feet) onto the concrete. He is accompanied by , Harriet Daniel RN. Pt continues to have some neck pain. Denies UE paresthesias/ weakness. He has mild headaches and shooting pains into his head intermittently. He states that every two to three days he has these symptoms. Will have these 4-5 times a day when he does have these symptoms. Not related to activity. Takes Excedrin Migraine w/o benefit. Pt reports pain in his L knee into his shin. He has seen Ortho for his shoulder pain but not for his knee. Was told from Dr. Moira Serna that his knee pain is coming from his back. He is bothered by his knee with going up and down steps. Pt denies weakness. Denies LBP or sciatica. Pt denies saddle paresthesias. Pt missed his last appointment to taper Gabapentin.   Has run out of Gabapentin But its managing without it . Primary complaint is L knee pain. . Denies persistent fevers, chills, weight changes, neurogenic bowel or bladder symptoms. Pt denies recent ED visits or hospitalizations.  reviewed. 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 Consults with Dr. Sharonda Luu for his foot and Dr. Daryl Shaw for his shoulder pain. Would like different ortho opinion for knee. At full duty. Pain Assessment  2/19/2018   Location of Pain Leg   Pain Location Comment -   Location Modifiers -   Severity of Pain 1   Quality of Pain Aching   Quality of Pain Comment -   Duration of Pain Persistent   Frequency of Pain Constant   Aggravating Factors Stairs; Walking; Other (Comment)   Aggravating Factors Comment climbing steps and ladders are the worse   Limiting Behavior -   Relieving Factors Other (Comment)   Relieving Factors Comment not climbing   Result of Injury No   Work-Related Injury -   Type of Injury -       PAST MEDICAL HISTORY   Past Medical History:   Diagnosis Date    Torn rotator cuff 08/2017    left rotator cuff tear       Past Surgical History:   Procedure Laterality Date    HAND/FINGER SURGERY UNLISTED      HX KNEE ARTHROSCOPY  1995    right knee    HX OTHER SURGICAL  2000    finger surgery, right 3rd finger. Reattach tendons    HX ROTATOR CUFF REPAIR  08/22/2017    left   . MEDICATIONS      Current Outpatient Prescriptions   Medication Sig Dispense Refill    gabapentin (NEURONTIN) 300 mg capsule Take 1 tab PO QHS PRN for pain 90 Cap 2        ALLERGIES  No Known Allergies       SOCIAL HISTORY    Social History     Social History    Marital status: SINGLE     Spouse name: N/A    Number of children: N/A    Years of education: N/A     Occupational History    Not on file. Social History Main Topics    Smoking status: Never Smoker    Smokeless tobacco: Never Used      Comment: pt counsled to continue to not smoke.      Alcohol use Yes      Comment: occasionally    Drug use: No    Sexual activity: Not on file     Other Topics Concern    Not on file     Social History Narrative       FAMILY HISTORY    Family History   Problem Relation Age of Onset    Cancer Father      colon ca at age 77 yo    Diabetes Sister     Cancer Brother        REVIEW OF SYSTEMS  Review of Systems   Constitutional: Negative for chills, fever and weight loss. Respiratory: Negative for shortness of breath. Cardiovascular: Negative for chest pain. Gastrointestinal: Negative for constipation. Negative for fecal incontinence   Genitourinary: Negative for dysuria. Negative for urinary incontinence   Musculoskeletal:        Per HPI   Skin: Negative for rash. Neurological: Negative for dizziness, tingling, tremors, focal weakness and headaches. Endo/Heme/Allergies: Does not bruise/bleed easily. Psychiatric/Behavioral: The patient does not have insomnia. PHYSICAL EXAMINATION  Visit Vitals    /89    Pulse 73    Temp 98 °F (36.7 °C) (Oral)    Resp 17    Ht 6' 2\" (1.88 m)    Wt 283 lb 9.6 oz (128.6 kg)    SpO2 97%    BMI 36.41 kg/m2         Accompanied by other. Constitutional:  Well developed, well nourished, in no acute distress. Psychiatric: Affect and mood are appropriate. Integumentary: No rashes or abrasions noted on exposed areas. Cardiovascular/Peripheral Vascular: Intact l pulses. No peripheral edema is noted. Lymphatic:  No evidence of lymphedema. No cervical lymphadenopathy. SPINE/MUSCULOSKELETAL EXAM    Cervical spine:  Neck is midline. Normal muscle tone. No focal atrophy is noted. Shoulder ROM intact. Negative Spurling's sign. Negative Tinel's sign. Negative Lynne's sign. Sensation grossly intact to light touch. Tenderness to palpation L hamstring tendon laterally. Fullness L posterior knee compared to the R. Crepitance of L knee. No effusion noted.     Lumbar spine:  No rash, ecchymosis, or gross obliquity. No fasciculations. No focal atrophy is noted. No tenderness to palpation at the sciatic notch. SI joints non-tender. Trochanters non tender. Sensation grossly intact to light touch. MOTOR:      Biceps  Triceps Deltoids Wrist Ext Wrist Flex Hand Intrin   Right +4/5 +4/5 +4/5 +4/5 +4/5 +4/5   Left +4/5 +4/5 +4/5 +4/5 +4/5 +4/5       Straight Leg raise negative. Ambulation without assistive device. FWB. Written by Shay Clay, as dictated by Lydia Johnson MD.    I, Dr. Lydia Johnson MD, confirm that all documentation is accurate. Mr. Amadou Sinclair may have a reminder for a \"due or due soon\" health maintenance. I have asked that he contact his primary care provider for follow-up on this health maintenance.

## 2018-02-19 NOTE — PATIENT INSTRUCTIONS
Healthy Upper Back: Exercises  Your Care Instructions  Here are some examples of exercises for your upper back. Start each exercise slowly. Ease off the exercise if you start to have pain. Your doctor or physical therapist will tell you when you can start these exercises and which ones will work best for you. How to do the exercises  Lower neck and upper back stretch    1. Stretch your arms out in front of your body. Clasp one hand on top of your other hand. 2. Gently reach out so that you feel your shoulder blades stretching away from each other. 3. Gently bend your head forward. 4. Hold for 15 to 30 seconds. 5. Repeat 2 to 4 times. Midback stretch    If you have knee pain, do not do this exercise. 1. Kneel on the floor, and sit back on your ankles. 2. Lean forward, place your hands on the floor, and stretch your arms out in front of you. Rest your head between your arms. 3. Gently push your chest toward the floor, reaching as far in front of you as possible. 4. Hold for 15 to 30 seconds. 5. Repeat 2 to 4 times. Shoulder rolls    1. Sit comfortably with your feet shoulder-width apart. You can also do this exercise while standing. 2. Roll your shoulders up, then back, and then down in a smooth, circular motion. 3. Repeat 2 to 4 times. Wall push-up    1. Stand against a wall with your feet about 12 to 24 inches back from the wall. If you feel any pain when you do this exercise, stand closer to the wall. 2. Place your hands on the wall slightly wider apart than your shoulders, and lean forward. 3. Gently lean your body toward the wall. Then push back to your starting position. Keep the motion smooth and controlled. 4. Repeat 8 to 12 times. Resisted shoulder blade squeeze    For this exercise, you will need elastic exercise material, such as surgical tubing or Thera-Band. 1. Sit or stand, holding the band in both hands in front of you.  Keep your elbows close to your sides, bent at a 90-degree angle. Your palms should face up. 2. Squeeze your shoulder blades together, and move your arms to the outside, stretching the band. Be sure to keep your elbows at your sides while you do this. 3. Relax. 4. Repeat 8 to 12 times. Resisted rows    For this exercise, you will need elastic exercise material, such as surgical tubing or Thera-Band. 1. Put the band around a solid object, such as a bedpost, at about waist level. Hold one end of the band in each hand. 2. With your elbows at your sides and bent to 90 degrees, pull the band back to move your shoulder blades toward each other. Return to the starting position. 3. Repeat 8 to 12 times. Follow-up care is a key part of your treatment and safety. Be sure to make and go to all appointments, and call your doctor if you are having problems. It's also a good idea to know your test results and keep a list of the medicines you take. Where can you learn more? Go to http://sharath-hank.info/. Enter G721 in the search box to learn more about \"Healthy Upper Back: Exercises. \"  Current as of: March 21, 2017  Content Version: 11.4  © 7315-1543 [employer]. Care instructions adapted under license by [employer] (which disclaims liability or warranty for this information). If you have questions about a medical condition or this instruction, always ask your healthcare professional. [place] [address] any warranty or liability for your use of this information.

## 2018-02-19 NOTE — PROGRESS NOTES
VORB ENTERED PER DR. Castillo Fails AS DOCUMENTED ON BLUE SHEET: Gabapentin 300mg Take 1 tab PO QHS PRN for pain Disp: 90 day supply

## 2018-02-26 ENCOUNTER — OFFICE VISIT (OUTPATIENT)
Dept: ORTHOPEDIC SURGERY | Facility: CLINIC | Age: 53
End: 2018-02-26

## 2018-02-26 VITALS
SYSTOLIC BLOOD PRESSURE: 125 MMHG | BODY MASS INDEX: 36.01 KG/M2 | TEMPERATURE: 97.4 F | HEART RATE: 101 BPM | HEIGHT: 74 IN | OXYGEN SATURATION: 96 % | DIASTOLIC BLOOD PRESSURE: 82 MMHG | WEIGHT: 280.6 LBS

## 2018-02-26 DIAGNOSIS — M79.605 LEFT LEG PAIN: Primary | ICD-10-CM

## 2018-02-26 DIAGNOSIS — S86.892A SHIN SPLINT, LEFT, INITIAL ENCOUNTER: ICD-10-CM

## 2018-02-26 RX ORDER — DICLOFENAC SODIUM 50 MG/1
50 TABLET, DELAYED RELEASE ORAL 2 TIMES DAILY WITH MEALS
Qty: 60 TAB | Refills: 1 | Status: SHIPPED | OUTPATIENT
Start: 2018-02-26

## 2018-02-26 NOTE — PROGRESS NOTES
Patient: Jaret Luque                MRN: [id number]       SSN: [id number]  YOB: 1965        AGE: 48 y.o. SEX: male  Body mass index is 36.03 kg/(m^2). PCP: Tia Sutherland MD  02/26/18    Chief Complaint: Left leg pain    HISTORY OF PRESENT ILLNESS: Mr. Jaret Luque is a 51-year-old male who presents today with left leg pain. The pain began on July 23, 2017 when he fell at work. He states he got his leg caught in the rung of a ladder. He was working in [place], [place]. When his leg got caught he had a pulling sensation in his leg. He has pain located over the anterior lateral aspect of his left tibia. This pain radiates down his tibia. He also had pain in his back for which he sees Dr. Kameron Martin for and pain in his left foot which was diagnosed as a neuroma by Dr. Summer oPp. These have been imaged as well as evaluated and treated by those respective physicians. For the pain in his left leg/knee he reports not much improvement in his pain since that time. He denies any numbness or tingling. He notes pain when he goes up and down a ladder or stands on it for long periods of time which he does for work. He is not taking any anti-inflammatory medications for this. He has not had any physical therapy. Past Medical History:   Diagnosis Date    Torn rotator cuff 08/2017    left rotator cuff tear       Family History   Problem Relation Age of Onset    Cancer Father      colon ca at age 75 yo    Diabetes Sister     Cancer Brother        Current Outpatient Prescriptions   Medication Sig Dispense Refill    diclofenac EC (VOLTAREN) 50 mg EC tablet Take 1 Tab by mouth two (2) times daily (with meals).  60 Tab 1    gabapentin (NEURONTIN) 300 mg capsule Take 1 tab PO QHS PRN for pain 90 Cap 2       No Known Allergies    Past Surgical History:   Procedure Laterality Date    HAND/FINGER SURGERY UNLISTED      HX KNEE ARTHROSCOPY  1995    right knee    HX OTHER SURGICAL  2000    finger surgery, right 3rd finger. Reattach tendons    HX ROTATOR CUFF REPAIR  08/22/2017    left       Social History     Social History    Marital status: SINGLE     Spouse name: N/A    Number of children: N/A    Years of education: N/A     Occupational History    Not on file. Social History Main Topics    Smoking status: Never Smoker    Smokeless tobacco: Never Used      Comment: pt counsled to continue to not smoke.  Alcohol use Yes      Comment: occasionally    Drug use: No    Sexual activity: Not on file     Other Topics Concern    Not on file     Social History Narrative       REVIEW OF SYSTEMS:      CON: negative for recent weight loss/gain, fever, or chills  EYE: negative for double or blurry vision  ENT: negative for hoarseness  RS:   negative for cough, URI, SOB  CV:  negative for chest pain, palpitations  GI:    negative for blood in stool, nausea/vomiting  :  negative for blood in urine  MS: As per HPI  Other systems reviewed and noted below. PHYSICAL EXAMINATION:  Visit Vitals    /82    Pulse (!) 101    Temp 97.4 °F (36.3 °C) (Oral)    Ht 6' 2\" (1.88 m)    Wt 280 lb 9.6 oz (127.3 kg)    SpO2 96%    BMI 36.03 kg/m2     Body mass index is 36.03 kg/(m^2). GENERAL: Alert and oriented x3, in no acute distress, well-developed, well-nourished. HEENT: Normocephalic, atraumatic. RESP: Non labored breathing with equal chest rise on inspiration. CV: Well perfused extremities. No cyanosis or clubbing noted. ABDOMEN: Soft, non-tender, non-distended. GAIT: Antalgic gait favoring the left leg. PHYSICAL EXAMINATION:  Physical examination of the left leg including the knee notes no obvious deformity of the left knee or leg. He has full knee range of motion without pain. He is nontender to palpation over the medial and lateral joint line. No pain with Alicias testing. No pain with varus or valgus stress testing.   No appreciated anterior or posterior instability. He is tender to palpation over the tibialis anterior and the anterior tibial crest.  He is tender to palpation over the course of the tibialis anterior. He has pain also in this location over the anterior tibialis with resisted foot dorsiflexion. He has minimal pain with resisted plantar flexion of the left foot and ankle. His sensation is intact to light touch distally. There is no pain with calf compression. RADIOGRAPHS:  X-rays of the left knee, three views which include the proximal tibia and distal femur were obtained today. These do not show any acute or chronic bony abnormalities. There are no arthritic changes noted. There are no fractures or dislocations. ASSESSMENT/PLAN:  Mr. David Aggarwal is a 61-year-old male with left shin splints. He is tender to palpation over the anterior tibialis muscle. He is also tender over the lateral aspect of the anterior tibial crest.  I discussed with him the treatment for this. I recommended some stretching exercises which I have given to him. I also recommended he take an over-the-counter anti-inflammatory. If his symptoms persist the next step may be further imaging. I do not think this is coming from his knee joint. I will see him back in one month to see how he is progressing.           Electronically signed by: Lars Mercado MD

## 2018-02-26 NOTE — PATIENT INSTRUCTIONS
Shin Splints: Care Instructions  Your Care Instructions    Shin splints cause pain in the shin, the front part of the lower leg. They can also cause swelling. The pain is most likely from repeated stress on the shinbone (tibia) and the tissue that connects the muscle to the tibia. Shin splints are common in people who run or jog. Activities where you run or jump on hard surfaces, such as basketball or tennis, can also lead to shin splints. They can also be caused by training too hard or running in shoes that are worn out. Follow-up care is a key part of your treatment and safety. Be sure to make and go to all appointments, and call your doctor if you are having problems. It's also a good idea to know your test results and keep a list of the medicines you take. How can you care for yourself at home? · Stop doing the activity that is causing pain, or do less of it, until you feel better. ¨ Run or exercise only on soft surfaces, such as dirt or grass. ¨ Run on level ground, and avoid hills. ¨ Reduce your speed and distance when you run. · If you have pain, prop up the sore leg on a pillow and ice it. Try to keep your leg above the level of your heart. This will help reduce swelling. ¨ Put ice or a cold pack on the area for 10 to 20 minutes at a time. Try to do this every 1 to 2 hours (when you are awake) or until the swelling goes down. Put a thin cloth between the ice and your skin. · Take an over-the-counter pain medicine, such as acetaminophen (Tylenol), ibuprofen (Advil, Motrin), or naproxen (Aleve). Be safe with medicines. Read and follow all instructions on the label. · If your doctor gave you stretches or exercises to do, do them exactly as directed. · Get a new pair of shoes. Pick shoes with good arch support and a cushioned sole. Or try shoe inserts (orthotics). Use them in both shoes, even if only one leg hurts. · Don't go back to your old exercise routine too quickly after you feel better. Start slowly. Then, bit by bit, increase how often and how long you work out. If you start out too fast, your pain may come back. When should you call for help? Watch closely for changes in your health, and be sure to contact your doctor if:  ? · You have new or worse pain in your shin. ? · The pain becomes focused in one small area of the shin. ? · You are not getting better after 2 weeks. Where can you learn more? Go to http://sharath-hank.info/. Enter C989 in the search box to learn more about \"Shin Splints: Care Instructions. \"  Current as of: March 21, 2017  Content Version: 11.4  © 0162-5635 [employer]. Care instructions adapted under license by [employer] (which disclaims liability or warranty for this information). If you have questions about a medical condition or this instruction, always ask your healthcare professional. [address] any warranty or liability for your use of this information.

## 2018-03-19 ENCOUNTER — OFFICE VISIT (OUTPATIENT)
Dept: ORTHOPEDIC SURGERY | Age: 53
End: 2018-03-19

## 2018-03-19 VITALS
BODY MASS INDEX: 36.24 KG/M2 | HEIGHT: 74 IN | DIASTOLIC BLOOD PRESSURE: 87 MMHG | WEIGHT: 282.4 LBS | SYSTOLIC BLOOD PRESSURE: 135 MMHG | RESPIRATION RATE: 18 BRPM | OXYGEN SATURATION: 96 % | TEMPERATURE: 96.7 F | HEART RATE: 72 BPM

## 2018-03-19 DIAGNOSIS — M79.672 LEFT FOOT PAIN: ICD-10-CM

## 2018-03-19 DIAGNOSIS — M72.2 PLANTAR FASCIAL FIBROMATOSIS OF LEFT FOOT: Primary | ICD-10-CM

## 2018-03-19 NOTE — PROGRESS NOTES
Patient is here today for a follow up on his left foot and tib/fib. He states that he recently saw Dr. Olivia Quiñones for the tib/fib pain. Patient states he went back to work on 12-4-18, patient states that is when the pain first began in the left tib/fib area. Patient denies any pain with palpation to the left fore foot.  Patient states he has more pain with extension ladders than with a step ladder

## 2018-03-19 NOTE — MR AVS SNAPSHOT
[address], [address] 706 [facility] ([place], [place]) 
[phone] Patient: Geovanny Obrien MRN: [id number] :1965 Visit Information Date & Time Provider Department Dept. Phone Encounter #  
 3/19/2018  3:40 PM Hiren Gonzalez, [address] [facility] [phone] Your Appointments 3/26/2018 10:00 AM  
Follow Up with Abida Cano MD  
[facility] [address] [place] [address] Road) Appt Note: 1 M FU LEFT LEG  
 [address] [facility], [address] [facility] [address] [phone]  
  
   
 [address] [facility], [address] [place] [id number] 2018  8:40 AM  
Follow Up with Virgie Cortez MD  
[facility] ([address] [facility]) Appt Note: W/C neck and back f/u  
 [address] [facility] [id number] [phone]  
  
   
 [address] [place] [address] Upcoming Health Maintenance Date Due FOBT Q 1 YEAR AGE 50-75 2017 Influenza Age 5 to Adult 2017 DTaP/Tdap/Td series (2 - Td) 2027 Allergies as of 3/19/2018  Review Complete On: 2018 By: Abida Cano MD  
 No Known Allergies Current Immunizations  Never Reviewed No immunizations on file. Not reviewed this visit You Were Diagnosed With   
  
 Codes Comments Left foot pain    -  Primary ICD-10-CM: E72.767 ICD-9-CM: 729.5 Plantar fascial fibromatosis of left foot     ICD-10-CM: M72.2 ICD-9-CM: 728.71 Vitals BP Pulse Temp Resp Height(growth percentile) Weight(growth percentile) 135/87 (BP 1 Location: Left arm, BP Patient Position: Sitting) 72 96.7 °F (35.9 °C) (Oral) 18 6' 2\" (1.88 m) 282 lb 6.4 oz (128.1 kg) SpO2 BMI Smoking Status 96% 36.26 kg/m2 Never Smoker Vitals History BMI and BSA Data Body Mass Index Body Surface Area 36.26 kg/m 2 2.59 m 2 Preferred Pharmacy Pharmacy Name Phone [facility], [address] [phone] Your Updated Medication List  
  
   
This list is accurate as of 3/19/18  5:25 PM.  Always use your most recent med list.  
  
  
  
  
 diclofenac EC 50 mg EC tablet Commonly known as:  VOLTAREN Take 1 Tab by mouth two (2) times daily (with meals). gabapentin 300 mg capsule Commonly known as:  NEURONTIN Take 1 tab PO QHS PRN for pain We Performed the Following AMB SUPPLY ORDER [[id number] Custom] Comments:  
 Firm spenco arch support Introducing [facility]! Galen Moss introduces Pharmacopeia patient portal. Now you can access parts of your medical record, email your doctor's office, and request medication refills online. 1. In your internet browser, go to [email or web address] [email or web address] 2. Click on the First Time User? Click Here link in the Sign In box. You will see the New Member Sign Up page. 3. Enter your Pharmacopeia Access Code exactly as it appears below. You will not need to use this code after youve completed the sign-up process. If you do not sign up before the expiration date, you must request a new code. · Pharmacopeia Access Code: [email or web address] Expires: 5/27/2018 12:50 PM 
 
4. Enter the last four digits of your Social Security Number (xxxx) and Date of Birth (mm/dd/yyyy) as indicated and click Submit. You will be taken to the next sign-up page. 5. Create a Local [employer] ID. This will be your Pharmacopeia login ID and cannot be changed, so think of one that is secure and easy to remember. 6. Create a [employer] password. You can change your password at any time. 7. Enter your Password Reset Question and Answer. This can be used at a later time if you forget your password. 8. Enter your e-mail address.  You will receive e-mail notification when new information is available in [employer]. 9. Click Sign Up. You can now view and download portions of your medical record. 10. Click the Download Summary menu link to download a portable copy of your medical information. If you have questions, please visit the Frequently Asked Questions section of the [employer] website. Remember, [employer] is NOT to be used for urgent needs. For medical emergencies, dial 911. Now available from your iPhone and Android! Please provide this summary of care documentation to your next provider. Your primary care clinician is listed as [place] [place]. If you have any questions after today's visit, please call [phone].

## 2018-03-19 NOTE — PROGRESS NOTES
AMBULATORY PROGRESS NOTE      Patient: Lennox Charter             MRN: [id number]     SSN: [id number] Body mass index is 36.26 kg/(m^2). YOB: 1965     AGE: 48 y.o. EX: male    PCP: Adrian Miner MD       IMPRESSION/DIAGNOSIS AND TREATMENT PLAN      DIAGNOSES  1. Plantar fascial fibromatosis of left foot    2. Left foot pain        Orders Placed This Encounter    AMB SUPPLY ORDER      Lennox Charter understands his diagnoses and the proposed plan. PLAN //  HPI AND EXAMINATION      Lennox Charter IS A 48 y.o. male who presents to my outpatient office for evaluation of: The patient is here for follow up being treated for left foot pain. He went back to work on December 4, 2017, and is doing quite a bit of standing and walking on extension ladders. Since he has been using extension ladders, he has been having pain and discomfort to the plantar portion of his left foot. After seeing him today, my impression is mid-level, plantar fascial discomfort, plantar fasciitis. Recommendation is for a Spenco firm arch support and at least six to eight-week follow up. Should his symptoms worsen, of course, we will see him sooner. He is seeing Dr. Fabiano Villanueva for anterior tibial tendinitis and proximal knee discomfort. He is seeing Dr. Johnathon Diez for his back. As it relates to his lateral hindfoot pain and fibula pain and as it relates to his left forefoot pain, he is no longer having any tenderness at all to his left forefoot, no numbness, no tingling, and no shooting pain. It is to be noted I have been seeing him for left forefoot interdigital web space neuritis, which is now resolved. IMPRESSION:      1. Resolved, left forefoot neuritis. 2. Left mid-level plantar fascial discomfort. Recommendation is for over-the-counter, simple, Spenco firm arch support.   Six to eight-week follow up, and we will see him sooner for any worsening of his baseline condition. Bogdan Mireles is alert/oriented (name, location, time) and follows commands well. he  is in no acute distress and his affect and mood are appropriate. Left ANKLE and FOOT       Gait: normal  Tenderness: mild mid level plantar forefoot pain   Cutaneous: No rashes, skin patches, wounds, or abrasions to the lower legs           Warm and Normal color. No regions of expressible drainage. Medial Border of Tibia Region: absent           Skin color, texture, turgor normal. Normal.  Joint Motion: ROM Ankle:Normal , Hindfoot: (ST,TN,CC Normal}, Forefoot toes:Normal  Neurologic Exam: Neuro: Motor: normal 5/5 strength in all tested muscle groups and no muscle wasting or atrophy and Sensory : no sensory deficits noted. Can Single Stance Heel Rise   Neuro: Negative bilateral Straight leg raise (seated position)   no Tinel's at PM NV Bundle Tarsal Canal   no Proximal Tarsal Tunnel Tenderness    no Distal Tarsal Tunnel Tenderness    See Musculoskeletal section for pertinent individual extremity examination    No abnormal hand/wrist, foot/ankle, or facial/neck tremors. Contractures: Gastrocnemius or Achilles Contractures absent  Joint / Tendon Stability: No Ankle or Subtalar instability or joint laxity. No peroneal sublux ability or dislocation  Alignment:  Normal Foot Alignment   and  Flexible  Vascular: Normal Pulses/ NL Capillary refill, No evidence of DVT seen on physical exam.   No calf swelling, no tenderness to calf muscles. Lymphatic:  No Evidence of Lymphedema. CHART REVIEW      Past Medical History:   Diagnosis Date    Torn rotator cuff 08/2017    left rotator cuff tear     Current Outpatient Prescriptions   Medication Sig    diclofenac EC (VOLTAREN) 50 mg EC tablet Take 1 Tab by mouth two (2) times daily (with meals).  gabapentin (NEURONTIN) 300 mg capsule Take 1 tab PO QHS PRN for pain     No current facility-administered medications for this visit. No Known Allergies  Past Surgical History:   Procedure Laterality Date    HAND/FINGER SURGERY UNLISTED      HX KNEE ARTHROSCOPY  1995    right knee    HX OTHER SURGICAL  2000    finger surgery, right 3rd finger. Reattach tendons    HX ROTATOR CUFF REPAIR  08/22/2017    left     Social History     Occupational History    Not on file. Social History Main Topics    Smoking status: Never Smoker    Smokeless tobacco: Never Used      Comment: pt counsled to continue to not smoke.  Alcohol use Yes      Comment: occasionally    Drug use: No    Sexual activity: Not on file     Family History   Problem Relation Age of Onset    Cancer Father      colon ca at age 77 yo    Diabetes Sister     Cancer Brother         REVIEW OF SYSTEMS : 3/19/2018  ALL BELOW ARE Negative except : SEE HPI      Constitutional: Negative for fever, chills and weight loss. Neg Weight Loss  Cardiovascular: Negative for chest pain, claudication and leg swelling. SOB, LAM   Gastrointestinal/Urological: Negative for pain, N/V/D/C, Blood in stool or urine,dysuria,  Hematuria, Incontinence  Endocrine: See HPI  Skin: see HPI  Musculoskeletal: see HPI. Neurological: Negative for dizziness and weakness, headaches,Visual Changes or   Confusion, or Seizures,   Psychiatric/Behavioral: Negative for depression, memory loss and substance abuse. Extremities:  Negative for hair changes, rash or skin lesion changes. Hematologic: Negative for Bleeding problems, bruising, pallor or swollen lymph nodes. Peripheral Vascular: No calf pain, vascular vein tenderness to calf pain              No calf throbbing, posterior knee throbbing pain     DIAGNOSTIC IMAGING       No notes on file     Please see above section of this report. I have personally reviewed the results of the above study. The interpretation of this study is my professional opinion.       Faisal Vasquez MD  3/19/2018  6:08 PM

## 2018-03-21 ENCOUNTER — TELEPHONE (OUTPATIENT)
Dept: ORTHOPEDIC SURGERY | Facility: CLINIC | Age: 53
End: 2018-03-21

## 2018-04-20 ENCOUNTER — OFFICE VISIT (OUTPATIENT)
Dept: ORTHOPEDIC SURGERY | Facility: CLINIC | Age: 53
End: 2018-04-20

## 2018-04-20 VITALS
OXYGEN SATURATION: 95 % | RESPIRATION RATE: 18 BRPM | HEART RATE: 65 BPM | WEIGHT: 281.6 LBS | DIASTOLIC BLOOD PRESSURE: 82 MMHG | SYSTOLIC BLOOD PRESSURE: 126 MMHG | BODY MASS INDEX: 36.14 KG/M2 | HEIGHT: 74 IN | TEMPERATURE: 97.8 F

## 2018-04-20 DIAGNOSIS — M79.605 LEFT LEG PAIN: Primary | ICD-10-CM

## 2018-04-20 DIAGNOSIS — M79.18 MYOFASCIAL PAIN SYNDROME: ICD-10-CM

## 2018-04-20 NOTE — PROGRESS NOTES
Patient: Xuan Alvarado                MRN: [id number]       SSN: [id number]  YOB: 1965        AGE: 48 y.o. SEX: male  Body mass index is 36.16 kg/(m^2). PCP: Enrique Canada MD  04/20/18 04/20/18: Chief Complaint: Left leg pain, persistent from last OV with Dr. Beverly Stark. OTC meds did not improve, patient working and requests next step in care, MRI? [facility] HISTORY OF PRESENT ILLNESS: Mr. Xuan Alvarado is a 70-year-old male who presents today with left leg pain. The pain began on July 23, 2017 when he fell at work. He states he got his leg caught in the rung of a ladder. He was working in [address] [place] [address]. When his leg got caught he had a pulling sensation in his leg. He has pain located over the anterior lateral aspect of his left tibia. This pain radiates down his tibia. He also had pain in his back for which he sees Dr. Shirin Richard for and pain in his left foot which was diagnosed as a neuroma by Dr. Heidi Talamantes. These have been imaged as well as evaluated and treated by those respective physicians. For the pain in his left leg/knee he reports not much improvement in his pain since that time. He denies any numbness or tingling. He notes pain when he goes up and down a ladder or stands on it for long periods of time which he does for work. He is not taking any anti-inflammatory medications for this. He has not had any physical therapy. Past Medical History:   Diagnosis Date    Torn rotator cuff 08/2017    left rotator cuff tear       Family History   Problem Relation Age of Onset    Cancer Father      colon ca at age 75 yo    Diabetes Sister     Cancer Brother        Current Outpatient Prescriptions   Medication Sig Dispense Refill    diclofenac EC (VOLTAREN) 50 mg EC tablet Take 1 Tab by mouth two (2) times daily (with meals).  60 Tab 1    gabapentin (NEURONTIN) 300 mg capsule Take 1 tab PO QHS PRN for pain 90 Cap 2       No Known Allergies    Past Surgical History:   Procedure Laterality Date    HAND/FINGER SURGERY UNLISTED      HX KNEE ARTHROSCOPY  1995    right knee    HX OTHER SURGICAL  2000    finger surgery, right 3rd finger. Reattach tendons    HX ROTATOR CUFF REPAIR  08/22/2017    left       Social History     Social History    Marital status: SINGLE     Spouse name: N/A    Number of children: N/A    Years of education: N/A     Occupational History    Not on file. Social History Main Topics    Smoking status: Never Smoker    Smokeless tobacco: Never Used      Comment: pt counsled to continue to not smoke.  Alcohol use Yes      Comment: occasionally    Drug use: No    Sexual activity: Not on file     Other Topics Concern    Not on file     Social History Narrative       REVIEW OF SYSTEMS:      CON: negative for recent weight loss/gain, fever, or chills  EYE: negative for double or blurry vision  ENT: negative for hoarseness  RS:   negative for cough, URI, SOB  CV:  negative for chest pain, palpitations  GI:    negative for blood in stool, nausea/vomiting  :  negative for blood in urine  MS: As per HPI  Other systems reviewed and noted below. PHYSICAL EXAMINATION:  Visit Vitals    /82    Pulse 65    Temp 97.8 °F (36.6 °C) (Oral)    Resp 18    Ht 6' 2\" (1.88 m)    Wt 281 lb 9.6 oz (127.7 kg)    SpO2 95%    BMI 36.16 kg/m2     Body mass index is 36.16 kg/(m^2). GENERAL: Alert and oriented x3, in no acute distress, well-developed, well-nourished. HEENT: Normocephalic, atraumatic. RESP: Non labored breathing with equal chest rise on inspiration. CV: Well perfused extremities. No cyanosis or clubbing noted. ABDOMEN: Soft, non-tender, non-distended. GAIT: Antalgic gait favoring the left leg. PHYSICAL EXAMINATION:  Physical examination of the left leg including the knee notes no obvious deformity of the left knee or leg. He has full knee range of motion without pain.   He is nontender to palpation over the medial and lateral joint line. No pain with Alicias testing. No pain with varus or valgus stress testing. No appreciated anterior or posterior instability. He is tender to palpation over the tibialis anterior and the anterior tibial crest.  He is tender to palpation over the course of the tibialis anterior. He has pain also in this location over the anterior tibialis with resisted foot dorsiflexion. He has minimal pain with resisted plantar flexion of the left foot and ankle. His sensation is intact to light touch distally. There is no pain with calf compression. RADIOGRAPHS: Re Rewiewed X-rays of the left knee, three views which include the proximal tibia and distal femur were obtained today. These do not show any acute or chronic bony abnormalities. There are no arthritic changes noted. There are no fractures or dislocations. ASSESSMENT/PLAN:  Mr. Drew Casanova is a 65-year-old male with left shin splints. He is tender to palpation over the anterior tibialis muscle. He is also tender over the lateral aspect of the anterior tibial crest.  I do not think this is coming from his knee joint. I will see him back after MRI Left calf. X rays reviewed all questions answered to his satisfaction.           Electronically signed by: Bertha Erazo PA-C

## 2018-05-03 ENCOUNTER — OFFICE VISIT (OUTPATIENT)
Dept: ORTHOPEDIC SURGERY | Facility: CLINIC | Age: 53
End: 2018-05-03

## 2018-05-03 DIAGNOSIS — S84.92XA NEURAPRAXIA OF LEFT LOWER EXTREMITY, INITIAL ENCOUNTER: Primary | ICD-10-CM

## 2018-05-03 NOTE — PROGRESS NOTES
HISTORY:  The patient returns for findings of a MRI of the left tibia/fibula which was essentially normal.  No aberrant pathology or cortical defects found in the left lower extremity, knee to ankle. He still has persistent pain down the outside of the left leg to the left ankle and occasionally into the front of his leg. When he uses a tight Ace wrap, almost constricting his blood supply he does have some relief. He has to remove the wrap on occasion because of blood supply concerns and numbness and tingling. He has no history of trauma to the back. He has seen Dr. Marta Brewer in the past.  She sent him back to our office for assessment of his left leg pain. PLAN:  At this point after reviewing the MRI, both the written report and imaging, and the absence of pathology, I am recommending an EMG nerve conduction study with a neurologist locally to assess his nerve structures. Of course there can always be a neuropathic neurapraxic type occurrence. He has no history of diabetes. There is no peripheral vascular disease noted in his history. He is still working and is on his knees a lot and climbs a lot of ladders. We will see him back once the EMG nerve conduction study results can be reviewed. Otherwise he is to continue his normal activities with no limitations.

## 2018-05-11 ENCOUNTER — TELEPHONE (OUTPATIENT)
Dept: ORTHOPEDIC SURGERY | Age: 53
End: 2018-05-11

## 2018-05-11 NOTE — TELEPHONE ENCOUNTER
JULIANNE [facility] [employer] WOULD LIKE TO KNOW IF WE HAVE SPECIFIC NEUROLOGIST WE USE FOR EMG'S?     PLEASE CALL TODAY IF POSSIBLE

## 2018-05-21 ENCOUNTER — OFFICE VISIT (OUTPATIENT)
Dept: ORTHOPEDIC SURGERY | Age: 53
End: 2018-05-21

## 2018-05-21 VITALS
HEART RATE: 71 BPM | SYSTOLIC BLOOD PRESSURE: 130 MMHG | RESPIRATION RATE: 17 BRPM | TEMPERATURE: 98.1 F | BODY MASS INDEX: 36.18 KG/M2 | DIASTOLIC BLOOD PRESSURE: 88 MMHG | WEIGHT: 281.8 LBS

## 2018-05-21 DIAGNOSIS — R20.2 LEFT LEG PARESTHESIAS: Primary | ICD-10-CM

## 2018-05-21 DIAGNOSIS — G44.86 CERVICOGENIC HEADACHE: ICD-10-CM

## 2018-05-21 DIAGNOSIS — S16.1XXS CERVICAL MYOFASCIAL STRAIN, SEQUELA: ICD-10-CM

## 2018-05-21 RX ORDER — GABAPENTIN 300 MG/1
CAPSULE ORAL
Qty: 90 CAP | Refills: 2 | Status: SHIPPED | OUTPATIENT
Start: 2018-05-21 | End: 2018-10-08 | Stop reason: SDUPTHER

## 2018-05-21 NOTE — PROGRESS NOTES
[facility] [address]  [address], [address]  [place], [facility]  Phone: [phone]  Fax: [phone]        Luciana Magana  : 1965  PCP: Ida Escobedo MD    PROGRESS NOTE      ASSESSMENT AND PLAN    Diagnoses and all orders for this visit:    1. Left leg paresthesias  -     gabapentin (NEURONTIN) 300 mg capsule; Take 1 tab PO QHS PRN for pain    2. Cervical myofascial strain, sequela  -     gabapentin (NEURONTIN) 300 mg capsule; Take 1 tab PO QHS PRN for pain    3. Cervicogenic headache-improved    1. Advised to continue HEP. 2. Will review EMG results when available    Follow-up Disposition: Not on File      HISTORY OF PRESENT ILLNESS  Hosea Alvarez is a 48 y.o. male. Pt presents to the office for a f/u visit , seen a few months ago for neck pain post fall. Now referred by ortho for LE paresthesias. Since last visit, reports that symptoms have changed. Evaluated by Dr. Will Nixon for knee/shin pain,, given NSAIDS and home ex. No improvement, tib/fib MRI ordered-benign. Pain now shooting into anterior L thigh and ant-medial calf. Reports weakness due to pain, +buckling. Some LBP and lateral thigh pain, but mainly calf. Scheduled for EMG later today. Denies UE paresthesias. Cervical H/A have improved with Gabapentin. Pt denies saddle paresthesias. Denies persistent fevers, chills, weight changes, neurogenic bowel or bladder symptoms.  reviewed. 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  Working     Pain Assessment  2018   Location of Pain Back;Leg   Pain Location Comment -   Location Modifiers Left   Severity of Pain 3   Quality of Pain Aching   Quality of Pain Comment shooting pain   Duration of Pain -   Frequency of Pain Constant   Aggravating Factors (No Data)   Aggravating Factors Comment sitting, traveling   Limiting Behavior -   Relieving Factors Nothing   Relieving Factors Comment - Result of Injury -   Work-Related Injury -   How long out of work? -   Type of Injury -       PAST MEDICAL HISTORY   Past Medical History:   Diagnosis Date    Torn rotator cuff 08/2017    left rotator cuff tear       Past Surgical History:   Procedure Laterality Date    HAND/FINGER SURGERY UNLISTED      HX KNEE ARTHROSCOPY  1995    right knee    HX OTHER SURGICAL  2000    finger surgery, right 3rd finger. Reattach tendons    HX ROTATOR CUFF REPAIR  08/22/2017    left   . MEDICATIONS      Current Outpatient Prescriptions   Medication Sig Dispense Refill    gabapentin (NEURONTIN) 300 mg capsule Take 1 tab PO QHS PRN for pain 90 Cap 2    diclofenac EC (VOLTAREN) 50 mg EC tablet Take 1 Tab by mouth two (2) times daily (with meals). 60 Tab 1        ALLERGIES  No Known Allergies       SOCIAL HISTORY    Social History     Social History    Marital status: SINGLE     Spouse name: N/A    Number of children: N/A    Years of education: N/A     Occupational History    Not on file. Social History Main Topics    Smoking status: Never Smoker    Smokeless tobacco: Never Used      Comment: pt counsled to continue to not smoke.  Alcohol use Yes      Comment: occasionally    Drug use: No    Sexual activity: Not on file     Other Topics Concern    Not on file     Social History Narrative       FAMILY HISTORY    Family History   Problem Relation Age of Onset    Cancer Father      colon ca at age 77 yo    Diabetes Sister     Cancer Brother        REVIEW OF SYSTEMS  Review of Systems   Constitutional: Negative for chills, fever and weight loss. Respiratory: Negative for shortness of breath. Cardiovascular: Negative for chest pain. Gastrointestinal: Negative for constipation. Negative for fecal incontinence   Genitourinary: Negative for dysuria. Negative for urinary incontinence   Musculoskeletal:        Per HPI   Skin: Negative for rash. Neurological: Positive for tingling.  Negative for dizziness, tremors, focal weakness and headaches. Endo/Heme/Allergies: Does not bruise/bleed easily. Psychiatric/Behavioral: The patient does not have insomnia. PHYSICAL EXAMINATION  Visit Vitals    /88 (BP 1 Location: Left arm, BP Patient Position: Sitting)    Pulse 71    Temp 98.1 °F (36.7 °C) (Oral)    Resp 17    Wt 281 lb 12.8 oz (127.8 kg)    BMI 36.18 kg/m2         Accompanied by self. Constitutional:  Well developed, well nourished, in no acute distress. Psychiatric: Affect and mood are appropriate. Integumentary: No rashes or abrasions noted on exposed areas. Cardiovascular/Peripheral Vascular: Intact l pulses. No peripheral edema is noted. Lymphatic:  No evidence of lymphedema. No cervical lymphadenopathy. SPINE/MUSCULOSKELETAL EXAM      Lumbar spine:  No rash, ecchymosis, or gross obliquity. No fasciculations. No focal atrophy is noted. Tenderness to palpation . No tenderness to palpation at the sciatic notch. SI joints non-tender. Trochanters non tender. MOTOR:         Hip Flex  Quads Hamstrings Ankle DF EHL Ankle PF   Right +4/5 +4/5 +4/5 +4/5 +4/5 +4/5   Left +4/5 +4/5 +4/5 +4/5 +4/5 +4/5           Straight Leg raise - . No calf tenderness. Ambulation without assistive device. DWB. LLE      Written by Sammy Jin, as dictated by Javi Baca MD.    I, Dr. Javi Baca MD, confirm that all documentation is accurate. Mr. Lm Moe may have a reminder for a \"due or due soon\" health maintenance. I have asked that he contact his primary care provider for follow-up on this health maintenance.

## 2018-05-21 NOTE — MR AVS SNAPSHOT
[address] [facility] [facility] [address] 
[phone] Patient: Kristine Alvarado MRN: [id number] :1965 Visit Information Date & Time Provider Department Dept. Phone Encounter #  
 2018  8:40 AM Spurgeon Najjar, MD [facility] [phone] [id number] Your Appointments 2018 10:15 AM  
Follow Up with Hema Kramer MD  
[facility] ([facility]) Appt Note: LEFT FOOT FU; PT  CALLED AND RS THE 18 APPT THE PT MISSED . WANTED APPT FOR A MONDAY AFTER THE 25TH OF MAY. [facility] FOR THIS NEW DATE. [address] [facility], [address] [facility]  
[phone] [address] [place] [address] [address] Upcoming Health Maintenance Date Due FOBT Q 1 YEAR AGE 50-75 2017 Influenza Age 5 to Adult 2018 DTaP/Tdap/Td series (2 - Td) 2027 Allergies as of 2018  Review Complete On: 2018 By: Vandana Moses No Known Allergies Current Immunizations  Never Reviewed No immunizations on file. Not reviewed this visit You Were Diagnosed With   
  
 Codes Comments Left leg paresthesias    -  Primary ICD-10-CM: R20.2 ICD-9-CM: 782.0 Cervical myofascial strain, sequela     ICD-10-CM: S16. 1XXS 
ICD-9-CM: 905.7, 847.0 Vitals BP Pulse Temp Resp Weight(growth percentile) BMI  
 130/88 (BP 1 Location: Left arm, BP Patient Position: Sitting) 71 98.1 °F (36.7 °C) (Oral) 17 281 lb 12.8 oz (127.8 kg) 36.18 kg/m2 Smoking Status Never Smoker BMI and BSA Data Body Mass Index Body Surface Area  
 36.18 kg/m 2 2.58 m 2 Preferred Pharmacy Pharmacy Name Phone [facility] [address] [facility] [address] [facility] [phone] Your Updated Medication List  
  
   
 This list is accurate as of 5/21/18  9:20 AM.  Always use your most recent med list.  
  
  
  
  
 diclofenac EC 50 mg EC tablet Commonly known as:  VOLTAREN Take 1 Tab by mouth two (2) times daily (with meals). gabapentin 300 mg capsule Commonly known as:  NEURONTIN Take 1 tab PO QHS PRN for pain Prescriptions Sent to Pharmacy Refills  
 gabapentin (NEURONTIN) 300 mg capsule 2 Sig: Take 1 tab PO QHS PRN for pain  
 Class: Normal  
 Pharmacy: [facility] [address] [place] [address], [address] [place] [address] #: [phone] Introducing [facility]! [employer] introduces eShares patient portal. Now you can access parts of your medical record, email your doctor's office, and request medication refills online. 1. In your internet browser, go to [email or web address] 2. Click on the First Time User? Click Here link in the Sign In box. You will see the New Member Sign Up page. 3. Enter your eShares Access Code exactly as it appears below. You will not need to use this code after youve completed the sign-up process. If you do not sign up before the expiration date, you must request a new code. · eShares Access Code: [email or web address] Expires: 5/27/2018 12:50 PM 
 
4. Enter the last four digits of your Social Security Number (xxxx) and Date of Birth (mm/dd/yyyy) as indicated and click Submit. You will be taken to the next sign-up page. 5. Create a FanTrailt ID. This will be your eShares login ID and cannot be changed, so think of one that is secure and easy to remember. 6. Create a eShares password. You can change your password at any time. 7. Enter your Password Reset Question and Answer. This can be used at a later time if you forget your password. 8. Enter your e-mail address. You will receive e-mail notification when new information is available in [address] 9. Click Sign Up. You can now view and download portions of your medical record. 10. Click the Download Summary menu link to download a portable copy of your medical information. If you have questions, please visit the Frequently Asked Questions section of the OurStage website. Remember, OurStage is NOT to be used for urgent needs. For medical emergencies, dial 911. Now available from your iPhone and Android! Please provide this summary of care documentation to your next provider. Your primary care clinician is listed as Ubaldo Canada. If you have any questions after today's visit, please call [phone].

## 2018-06-04 ENCOUNTER — OFFICE VISIT (OUTPATIENT)
Dept: ORTHOPEDIC SURGERY | Age: 53
End: 2018-06-04

## 2018-06-04 VITALS
DIASTOLIC BLOOD PRESSURE: 83 MMHG | BODY MASS INDEX: 36.06 KG/M2 | RESPIRATION RATE: 16 BRPM | HEIGHT: 74 IN | TEMPERATURE: 97.6 F | WEIGHT: 281 LBS | HEART RATE: 83 BPM | SYSTOLIC BLOOD PRESSURE: 133 MMHG | OXYGEN SATURATION: 96 %

## 2018-06-04 DIAGNOSIS — M72.2 PLANTAR FASCIITIS OF LEFT FOOT: Primary | ICD-10-CM

## 2018-06-04 NOTE — PROGRESS NOTES
AMBULATORY PROGRESS NOTE      Patient: Claudio Hawkins             MRN: [id number]     SSN: [id number] Body mass index is 36.08 kg/(m^2). YOB: 1965     AGE: 48 y.o. EX: male    PCP: Ady Harden MD       IMPRESSION//  DIAGNOSIS AND TREATMENT PLAN         DIAGNOSES  1. Plantar fasciitis of left foot        No orders of the defined types were placed in this encounter. Claudio Hawkins understands his diagnoses and the proposed plan. PLAN  HPI //  EXAMINATION        Claudio Hawkins IS A 48 y.o. male who presents to my outpatient office for evaluation of: Follow-up fall, being treated for left foot pain. He states the arch support has been helping him as relates the plantar portion of his left foot but he is still having some discomfort to the lateral part of his left knee. He saw Minesh Stewart a few weeks ago, and patient had an EMG/nerve conduction study, results are not available for me to review. Patient reports having burning, tingling, some numbness in the lateral part of his leg, anteriolateral part of his knee and the posterolateral portion of his left knee. He is accompanied by his wife today. They state the discomfort has been present ever since he had his accident. He has been followed also by Minesh Stewart as well as Flora Sen of the spine division of [facility]. Patient has been treated for neuropraxia of the left leg as a primary diagnosis by Minesh Stewart. As it relates to his left foot, he admits to having no pain in his left foot today. He states the orthotic insert he has been wearing has helped him quite a bit for the planter portion of his left foot.         ANKLE/FOOT left    Visit Vitals    /83    Pulse 83    Temp 97.6 °F (36.4 °C) (Oral)    Resp 16    Ht 6' 2\" (1.88 m)    Wt 281 lb (127.5 kg)    SpO2 96%    BMI 36.08 kg/m2        Psychiatry: Alert, Oriented x 3; Speech normal in context and clarity,            Memory intact grossly, no involuntary movements - tremors, no dementia  Gait: normal  Tenderness:none  tenderness  , Cutaneous: WNL,    Joint Motion:   ST Normal, TT Normal, Forefoot MTP's Normal  Joint / Tendon Stability: No Ankle or Subtalar instability or joint laxity. No peroneal sublux ability or dislocation  Alignment: Forefoot, Midfoot, Hindfoot WNL. Neuro Motor/Sensory: NL/NL, Vascular: NL foot/ankle pulses  Lymphatics: No extremity lymphedema, No calf swelling, no tenderness to calf muscles. Visit Vitals    /83    Pulse 83    Temp 97.6 °F (36.4 °C) (Oral)    Resp 16    Ht 6' 2\" (1.88 m)    Wt 281 lb (127.5 kg)    SpO2 96%    BMI 36.08 kg/m2            MEDICAL CHART REVIEW        Past Medical History:   Diagnosis Date    Torn rotator cuff 08/2017    left rotator cuff tear     Current Outpatient Prescriptions   Medication Sig    gabapentin (NEURONTIN) 300 mg capsule Take 1 tab PO QHS PRN for pain    diclofenac EC (VOLTAREN) 50 mg EC tablet Take 1 Tab by mouth two (2) times daily (with meals). No current facility-administered medications for this visit. No Known Allergies  Past Surgical History:   Procedure Laterality Date    HAND/FINGER SURGERY UNLISTED      HX KNEE ARTHROSCOPY  1995    right knee    HX OTHER SURGICAL  2000    finger surgery, right 3rd finger. Reattach tendons    HX ROTATOR CUFF REPAIR  08/22/2017    left     Social History     Occupational History    Not on file. Social History Main Topics    Smoking status: Never Smoker    Smokeless tobacco: Never Used      Comment: pt counsled to continue to not smoke.      Alcohol use Yes      Comment: occasionally    Drug use: No    Sexual activity: Not on file     Family History   Problem Relation Age of Onset    Cancer Father      colon ca at age 77 yo    Diabetes Sister     Cancer Brother              REVIEW OF SYSTEMS : 6/4/2018  ALL BELOW ARE Negative except : SEE HPI      Constitutional: Negative for fever, chills and weight loss. Neg Weight Loss  Cardiovascular: Negative for chest pain, claudication and leg swelling. SOB, LAM   Gastrointestinal/Urological: Negative for pain, N/V/D/C, Blood in stool or urine,dysuria,  Hematuria, Incontinence  Endocrine: See HPI  Skin: see HPI  Musculoskeletal: see HPI. Neurological: Negative for dizziness and weakness, headaches,Visual Changes or   Confusion, or Seizures,   Psychiatric/Behavioral: Negative for depression, memory loss and substance abuse. Extremities:  Negative for hair changes, rash or skin lesion changes. Hematologic: Negative for Bleeding problems, bruising, pallor or swollen lymph nodes. Peripheral Vascular: No calf pain, vascular vein tenderness to calf pain              No calf throbbing, posterior knee throbbing pain         DIAGNOSTIC IMAGING          No notes on file     Please see above section of this report. I have personally reviewed the results of the above study. The interpretation of this study is my professional opinion.       Elly Huffman MD  6/4/2018  10:19 AM

## 2018-06-04 NOTE — PATIENT INSTRUCTIONS
Follow up with DANIE Kilgore and/or Dr. John Espinosa in regards to the NCS/EMG that has been ordered by DANIE Kilgore  Continue activities as tolerated  Follow up with Dr. Yash Medrano as scheduled       Foot Pain: Care Instructions  Your Care Instructions  Foot injuries that cause pain and swelling are fairly common. Almost all sports or home repair projects can cause a misstep that ends up as foot pain. Normal wear and tear, especially as you get older, also can cause foot pain. Most minor foot injuries will heal on their own, and home treatment is usually all you need to do. If you have a severe injury, you may need tests and treatment. Follow-up care is a key part of your treatment and safety. Be sure to make and go to all appointments, and call your doctor if you are having problems. It's also a good idea to know your test results and keep a list of the medicines you take. How can you care for yourself at home? · Take pain medicines exactly as directed. ¨ If the doctor gave you a prescription medicine for pain, take it as prescribed. ¨ If you are not taking a prescription pain medicine, ask your doctor if you can take an over-the-counter medicine. · Rest and protect your foot. Take a break from any activity that may cause pain. · Put ice or a cold pack on your foot for 10 to 20 minutes at a time. Put a thin cloth between the ice and your skin. · Prop up the sore foot on a pillow when you ice it or anytime you sit or lie down during the next 3 days. Try to keep it above the level of your heart. This will help reduce swelling. · Your doctor may recommend that you wrap your foot with an elastic bandage. Keep your foot wrapped for as long as your doctor advises. · If your doctor recommends crutches, use them as directed. · Wear roomy footwear. · As soon as pain and swelling end, begin gentle exercises of your foot. Your doctor can tell you which exercises will help. When should you call for help?   Call [phone] anytime you think you may need emergency care. For example, call if:  ? · Your foot turns pale, white, blue, or cold. ?Call your doctor now or seek immediate medical care if:  ? · You cannot move or stand on your foot. ? · Your foot looks twisted or out of its normal position. ? · Your foot is not stable when you step down. ? · You have signs of infection, such as:  ¨ Increased pain, swelling, warmth, or redness. ¨ Red streaks leading from the sore area. ¨ Pus draining from a place on your foot. ¨ A fever. ? · Your foot is numb or tingly. ? Watch closely for changes in your health, and be sure to contact your doctor if:  ? · You do not get better as expected. ? · You have bruises from an injury that last longer than 2 weeks. Where can you learn more? Go to http://sharath-hank.info/. Enter D906 in the search box to learn more about \"Foot Pain: Care Instructions. \"  Current as of: March 21, 2017  Content Version: 11.4  © 9892-6418 [employer]. Care instructions adapted under license by [employer] (which disclaims liability or warranty for this information). If you have questions about a medical condition or this instruction, always ask your healthcare professional. [address] any warranty or liability for your use of this information.

## 2018-06-04 NOTE — PROGRESS NOTES
Patient is here today for a follow up on his left foot  He has recently been seen by DANIE Kilgore, who ordered a NCS/EMG.

## 2018-06-04 NOTE — MR AVS SNAPSHOT
[facility] [address], [address] [facility] 
[phone] Patient: Teri Cai MRN: [id number] :1965 Visit Information Date & Time Provider Department Dept. Phone Encounter #  
 2018 10:15 AM Uvaldo rGimm MD [facility] [phone] [id number] Follow-up Instructions Return if symptoms worsen or fail to improve. Your Appointments 2018  8:50 AM  
Follow Up with Priyanka Rowell MD  
[facility] ([address] [place] [address]) Appt Note: 3 MO F/U  
 [address] [facility] [id number] [phone]  
  
   
 [address] [facility] [address] Upcoming Health Maintenance Date Due FOBT Q 1 YEAR AGE 50-75 2017 Influenza Age 5 to Adult 2018 DTaP/Tdap/Td series (2 - Td) 2027 Allergies as of 2018  Review Complete On: 2018 By: Milka Quick No Known Allergies Current Immunizations  Never Reviewed No immunizations on file. Not reviewed this visit Vitals BP Pulse Temp Resp Height(growth percentile) Weight(growth percentile) 133/83 83 97.6 °F (36.4 °C) (Oral) 16 6' 2\" (1.88 m) 281 lb (127.5 kg) SpO2 BMI Smoking Status 96% 36.08 kg/m2 Never Smoker BMI and BSA Data Body Mass Index Body Surface Area 36.08 kg/m 2 2.58 m 2 Preferred Pharmacy Pharmacy Name Phone [place] [address] [facility] [phone] Your Updated Medication List  
  
   
This list is accurate as of 18 10:15 AM.  Always use your most recent med list.  
  
  
  
  
 diclofenac EC 50 mg EC tablet Commonly known as:  VOLTAREN Take 1 Tab by mouth two (2) times daily (with meals). gabapentin 300 mg capsule Commonly known as:  NEURONTIN  
 Take 1 tab PO QHS PRN for pain Follow-up Instructions Return if symptoms worsen or fail to improve. Patient Instructions Follow up with DANIE Kilgore and/or Dr. Carlos Sue in regards to the NCS/EMG that has been ordered by DANIE Kilgore Continue activities as tolerated Follow up with Dr. Shayla Muniz as scheduled Foot Pain: Care Instructions Your Care Instructions Foot injuries that cause pain and swelling are fairly common. Almost all sports or home repair projects can cause a misstep that ends up as foot pain. Normal wear and tear, especially as you get older, also can cause foot pain. Most minor foot injuries will heal on their own, and home treatment is usually all you need to do. If you have a severe injury, you may need tests and treatment. Follow-up care is a key part of your treatment and safety. Be sure to make and go to all appointments, and call your doctor if you are having problems. It's also a good idea to know your test results and keep a list of the medicines you take. How can you care for yourself at home? · Take pain medicines exactly as directed. ¨ If the doctor gave you a prescription medicine for pain, take it as prescribed. ¨ If you are not taking a prescription pain medicine, ask your doctor if you can take an over-the-counter medicine. · Rest and protect your foot. Take a break from any activity that may cause pain. · Put ice or a cold pack on your foot for 10 to 20 minutes at a time. Put a thin cloth between the ice and your skin. · Prop up the sore foot on a pillow when you ice it or anytime you sit or lie down during the next 3 days. Try to keep it above the level of your heart. This will help reduce swelling. · Your doctor may recommend that you wrap your foot with an elastic bandage. Keep your foot wrapped for as long as your doctor advises. · If your doctor recommends crutches, use them as directed. · Wear roomy footwear. · As soon as pain and swelling end, begin gentle exercises of your foot. Your doctor can tell you which exercises will help. When should you call for help? Call 911 anytime you think you may need emergency care. For example, call if: 
? · Your foot turns pale, white, blue, or cold. ?Call your doctor now or seek immediate medical care if: 
? · You cannot move or stand on your foot. ? · Your foot looks twisted or out of its normal position. ? · Your foot is not stable when you step down. ? · You have signs of infection, such as: 
¨ Increased pain, swelling, warmth, or redness. ¨ Red streaks leading from the sore area. ¨ Pus draining from a place on your foot. ¨ A fever. ? · Your foot is numb or tingly. ? Watch closely for changes in your health, and be sure to contact your doctor if: 
? · You do not get better as expected. ? · You have bruises from an injury that last longer than 2 weeks. Where can you learn more? Go to http://sharath-hank.info/. Enter K226 in the search box to learn more about \"Foot Pain: Care Instructions. \" Current as of: March 21, 2017 Content Version: 11.4 © 8466-7946 [employer]. Care instructions adapted under license by [employer] (which disclaims liability or warranty for this information). If you have questions about a medical condition or this instruction, always ask your healthcare professional. [place] [address] any warranty or liability for your use of this information. Introducing [facility]! Zeny Del Valle introduces [employer] patient portal. Now you can access parts of your medical record, email your doctor's office, and request medication refills online. 1. In your internet browser, go to [email or web address] 2. Click on the First Time User? Click Here link in the Sign In box. You will see the New Member Sign Up page. 3. Enter your [facility] Access Code exactly as it appears below. You will not need to use this code after youve completed the sign-up process. If you do not sign up before the expiration date, you must request a new code. · [facility] Access Code: [email or web address] Expires: 9/2/2018 10:15 AM 
 
4. Enter the last four digits of your Social Security Number (xxxx) and Date of Birth (mm/dd/yyyy) as indicated and click Submit. You will be taken to the next sign-up page. 5. Create a [facility] ID. This will be your [facility] login ID and cannot be changed, so think of one that is secure and easy to remember. 6. Create a [facility] password. You can change your password at any time. 7. Enter your Password Reset Question and Answer. This can be used at a later time if you forget your password. 8. Enter your e-mail address. You will receive e-mail notification when new information is available in [address] 9. Click Sign Up. You can now view and download portions of your medical record. 10. Click the Download Summary menu link to download a portable copy of your medical information. If you have questions, please visit the Frequently Asked Questions section of the [facility] website. Remember, [facility] is NOT to be used for urgent needs. For medical emergencies, dial 911. Now available from your iPhone and Android! Please provide this summary of care documentation to your next provider. Your primary care clinician is listed as Ubaldo [place]. If you have any questions after today's visit, please call [phone].

## 2018-06-11 ENCOUNTER — OFFICE VISIT (OUTPATIENT)
Dept: ORTHOPEDIC SURGERY | Facility: CLINIC | Age: 53
End: 2018-06-11

## 2018-06-11 VITALS
OXYGEN SATURATION: 96 % | RESPIRATION RATE: 16 BRPM | TEMPERATURE: 96.7 F | HEART RATE: 75 BPM | BODY MASS INDEX: 36.09 KG/M2 | WEIGHT: 281.2 LBS | DIASTOLIC BLOOD PRESSURE: 87 MMHG | HEIGHT: 74 IN | SYSTOLIC BLOOD PRESSURE: 126 MMHG

## 2018-06-11 DIAGNOSIS — S84.92XD NEURAPRAXIA OF LEFT LOWER EXTREMITY, SUBSEQUENT ENCOUNTER: ICD-10-CM

## 2018-06-11 DIAGNOSIS — G44.86 CERVICOGENIC HEADACHE: ICD-10-CM

## 2018-06-11 DIAGNOSIS — M79.672 LEFT FOOT PAIN: Primary | ICD-10-CM

## 2018-06-11 DIAGNOSIS — R20.2 LEFT LEG PARESTHESIAS: ICD-10-CM

## 2018-06-11 PROBLEM — E66.01 SEVERE OBESITY (BMI 35.0-39.9): Status: ACTIVE | Noted: 2018-06-11

## 2018-10-08 ENCOUNTER — OFFICE VISIT (OUTPATIENT)
Dept: ORTHOPEDIC SURGERY | Age: 53
End: 2018-10-08

## 2018-10-08 VITALS
WEIGHT: 280.6 LBS | TEMPERATURE: 98 F | RESPIRATION RATE: 17 BRPM | BODY MASS INDEX: 36.01 KG/M2 | HEIGHT: 74 IN | OXYGEN SATURATION: 98 % | DIASTOLIC BLOOD PRESSURE: 85 MMHG | SYSTOLIC BLOOD PRESSURE: 137 MMHG | HEART RATE: 73 BPM

## 2018-10-08 DIAGNOSIS — S16.1XXS CERVICAL MYOFASCIAL STRAIN, SEQUELA: ICD-10-CM

## 2018-10-08 DIAGNOSIS — M48.02 CERVICAL SPINAL STENOSIS: ICD-10-CM

## 2018-10-08 DIAGNOSIS — M79.605 LEG PAIN, LATERAL, LEFT: Primary | ICD-10-CM

## 2018-10-08 RX ORDER — GABAPENTIN 300 MG/1
CAPSULE ORAL
Qty: 90 CAP | Refills: 2 | Status: SHIPPED | OUTPATIENT
Start: 2018-10-08

## 2018-10-08 NOTE — PROGRESS NOTES
[facility]  [address]  [place], [facility] [address]  Phone: [phone]  Fax: [phone]        Nicholas Orellana  : 1965  PCP: No primary care provider on file. PROGRESS NOTE      ASSESSMENT AND PLAN    Diagnoses and all orders for this visit:    1. Leg pain, lateral, left  -     REFERRAL TO PHYSICAL THERAPY    2. Cervical myofascial strain, sequela  -     gabapentin (NEURONTIN) 300 mg capsule; Take 1 tab PO QHS PRN for pain    3. Cervical spinal stenosis, no myelopathy or radiculopathy       1. Advised to continue HEP. 2. Referral to PT for leg stretching and strengthening. Symptoms post fall initially in foot and shin/ upper lateral calf. Reporting some proximal progression, no clear radicular pattern, EMG benign. Tib/fib MRI benign. 3. Continue Gabapentin  4. Given information on neuropathic pain  5. Cervical issues have settled down. Follow-up Disposition:  Return if symptoms worsen or fail to improve. HISTORY OF PRESENT ILLNESS  Martha Ga is a 48 y.o. male. Pt presents to the office for a f/u visit for neck and left leg pain. Pt had EMG LE 2018 which was normal.  Reports change in symptoms post EMG (now 5 months ago). Pt reports seeing Dr. Russell Mitchell after the EMG and states he did not have an explanation of the pain. Pt states that his pain began in his L shin after his fall 2017 but since the EMG he has felt pain from his shin, now through thigh. He complains of a constant ache with sharp pain occasionally in lateral calf. Pt denies paresthesias. Pt reports his pain is the same walking, standing, and laying, and is slightly worse on ladders. He reports flares of his pain, with overall worsening baseline. Pt reports offloading to reduce pain, which is hard to do in the car. Pt complains of an episode of pain between his neck and L shoulder. Had rotator cuff repair post fall. Pt admits to some imbalance. Continues to work in construction, including ladders. Gabapentin started last visit, helped with neck pain, no benefit for leg pain. Has been seeing Dr. Ana Laura Jensen for Ponce's neuroma, plantar fascitiis. Had knee examined by Dr. Chula Trivedi, no further w/u planned. Location of pain: neck, some spasms  Does pain radiate into extremities: LLE. Stretching pain behind L knee. Does patient have weakness: no   Pt denies saddle paresthesias. Medications pt is on: Gabapentin 300mg QAM with relief for neck. Diclofenac 50mg BID previously with no benefit. Pt denies recent ED visits or hospitalizations. Denies persistent fevers, chills, weight changes, neurogenic bowel or bladder symptoms. Treatments patient has tried:  Physical therapy:Yes for shoulder  Doing HEP: Yes  Non-opioid medications: Yes  Spinal injections: No  Spinal surgery- No.        reviewed. 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 Currently Working. He works nights. Pt fell 7/2017, Covered by . PMHx plantar fascitis. Pain Assessment  10/8/2018   Location of Pain Neck; Shoulder;Back   Pain Location Comment -   Location Modifiers Left   Severity of Pain 5   Quality of Pain Dull;Aching   Quality of Pain Comment -   Duration of Pain -   Frequency of Pain -   Aggravating Factors (No Data)   Aggravating Factors Comment sitting up straight   Limiting Behavior -   Relieving Factors Nothing   Relieving Factors Comment -   Result of Injury -   Work-Related Injury -   How long out of work? -   Type of Injury -       PAST MEDICAL HISTORY   Past Medical History:   Diagnosis Date    History of EMG/NCV, LLE 5/2018, Dr. Lisa Goodman. Normal study.      Torn rotator cuff 08/2017    left rotator cuff tear       Past Surgical History:   Procedure Laterality Date    HAND/FINGER SURGERY UNLISTED      HX KNEE ARTHROSCOPY  1995    right knee    HX OTHER SURGICAL  2000    finger surgery, right 3rd finger. Reattach tendons    HX ROTATOR CUFF REPAIR  08/22/2017    left   . MEDICATIONS      Current Outpatient Prescriptions   Medication Sig Dispense Refill    gabapentin (NEURONTIN) 300 mg capsule Take 1 tab PO QHS PRN for pain 90 Cap 2    diclofenac EC (VOLTAREN) 50 mg EC tablet Take 1 Tab by mouth two (2) times daily (with meals). 60 Tab 1        ALLERGIES  No Known Allergies       SOCIAL HISTORY    Social History     Social History    Marital status: SINGLE     Spouse name: N/A    Number of children: N/A    Years of education: N/A     Occupational History    Not on file. Social History Main Topics    Smoking status: Never Smoker    Smokeless tobacco: Never Used      Comment: pt counsled to continue to not smoke.  Alcohol use Yes      Comment: occasionally    Drug use: No    Sexual activity: Not on file     Other Topics Concern    Not on file     Social History Narrative     Social History Narrative      Problem Relation Age of Onset    Cancer Father      colon ca at age 77 yo    Diabetes Sister     Cancer Brother        REVIEW OF SYSTEMS  Review of Systems   Constitutional: Negative for chills, fever and weight loss. Respiratory: Negative for shortness of breath. Cardiovascular: Negative for chest pain. Gastrointestinal: Negative for constipation. Negative for fecal incontinence   Genitourinary: Negative for dysuria. Negative for urinary incontinence   Musculoskeletal:        Per HPI   Skin: Negative for rash. Neurological: Negative for dizziness, tingling, tremors, focal weakness and headaches. Endo/Heme/Allergies: Does not bruise/bleed easily. Psychiatric/Behavioral: The patient does not have insomnia. PHYSICAL EXAMINATION  Visit Vitals    /85    Pulse 73    Temp 98 °F (36.7 °C)    Resp 17    Ht 6' 2\" (1.88 m)    Wt 280 lb 9.6 oz (127.3 kg)    SpO2 98%    BMI 36.03 kg/m2         Accompanied by self.       Constitutional:  Well developed, well nourished, in no acute distress. Psychiatric: Affect and mood are appropriate. Integumentary: No rashes or abrasions noted on exposed areas. Cardiovascular/Peripheral Vascular: Intact l pulses. No peripheral edema is noted. Lymphatic:  No evidence of lymphedema. No cervical lymphadenopathy. SPINE/MUSCULOSKELETAL EXAM    Cervical spine:  Neck is midline. Normal muscle tone. No focal atrophy is noted. Tenderness to palpation L upper trapezius. Negative Spurling's sign. Negative Tinel's sign. Negative Lynne's sign. Sensation grossly intact to light touch. Lumbar spine:  No rash, ecchymosis, or gross obliquity. No fasciculations. No focal atrophy is noted. Tenderness to palpation L L4-5, L5-S1, L sciatic notch, L trochanteric bursa. Calf is non tender. SI joints non-tender. Sensation grossly intact to light touch. MOTOR:      Biceps  Triceps Deltoids Wrist Ext Wrist Flex Hand Intrin   Right +4/5 +4/5 +4/5 +4/5 +4/5 +4/5   Left +4/5 +4/5 +4/5 +4/5 +4/5 +4/5        Hip Flex  Quads Hamstrings Ankle DF EHL Ankle PF   Right +4/5 +4/5 +4/5 +4/5 +4/5 +4/5   Left +4/5 +4/5 +4/5 +4/5 +4/5 +4/5       Straight Leg raise negative. No difficulty with tandem gait. Ambulation without assistive device. FWB. Written by Katia Fiore, as dictated by Den Perez MD.    I, Dr. Den Perez MD, confirm that all documentation is accurate. Mr. Burgess Anne may have a reminder for a \"due or due soon\" health maintenance. I have asked that he contact his primary care provider for follow-up on this health maintenance.

## 2018-10-08 NOTE — PATIENT INSTRUCTIONS
Neuropathic Pain: Care Instructions  Your Care Instructions    Neuropathic pain is caused by pressure on or damage to your nerves. It's often simply called nerve pain. Some people feel this type of pain all the time. For others, it comes and goes. Diabetes, shingles, or an injury can cause nerve pain. Many people say the pain feels sharp, burning, or stabbing. But some people feel it as a dull ache. In some cases, it makes your skin very sensitive. So touch, pressure, and other sensations that did not hurt before may now cause pain. It's important to know that this kind of pain is real and can affect your quality of life. It's also important to know that treatment can help. Treatment includes pain medicines, exercise, and physical therapy. Medicines can help reduce the number of pain signals that travel over the nerves. This can make the painful areas less sensitive. It can also help you sleep better and improve your mood. But medicines are only one part of successful treatment. Most people do best with more than one kind of treatment. Your doctor may recommend that you try cognitive-behavioral therapy and stress management. Or, if needed, you may decide to try to quit smoking, lower your blood pressure, or better control blood sugar. These kinds of healthy changes can also make a difference. If you feel that your treatment is not working, talk to your doctor. And be sure to tell your doctor if you think you might be depressed or anxious. These are common problems that can also be treated. Follow-up care is a key part of your treatment and safety. Be sure to make and go to all appointments, and call your doctor if you are having problems. It's also a good idea to know your test results and keep a list of the medicines you take. How can you care for yourself at home? · Be safe with medicines. Read and follow all instructions on the label.   ¨ If the doctor gave you a prescription medicine for pain, take it as prescribed. ¨ If you are not taking a prescription pain medicine, ask your doctor if you can take an over-the-counter medicine. · Save hard tasks for days when you have less pain. Follow a hard task with an easy task. And remember to take breaks. · Relax, and reduce stress. You may want to try deep breathing or meditation. These can help. · Keep moving. Gentle, daily exercise can help reduce pain. Your doctor or physical therapist can tell you what type of exercise is best for you. This may include walking, swimming, and stationary biking. It may also include stretches and range-of-motion exercises. · Try heat, cold packs, and massage. · Get enough sleep. Constant pain can make you more tired. If the pain makes it hard to sleep, talk with your doctor. · Think positively. Your thoughts can affect your pain. Do fun things to distract yourself from the pain. See a movie, read a book, listen to music, or spend time with a friend. · Keep a pain diary. Try to write down how strong your pain is and what it feels like. Also try to notice and write down how your moods, thoughts, sleep, activities, and medicine affect your pain. These notes can help you and your doctor find the best ways to treat your pain. Reducing constipation caused by pain medicine  Pain medicines often cause constipation. To reduce constipation:  · Include fruits, vegetables, beans, and whole grains in your diet each day. These foods are high in fiber. · Drink plenty of fluids, enough so that your urine is light yellow or clear like water. If you have kidney, heart, or liver disease and have to limit fluids, talk with your doctor before you increase the amount of fluids you drink. · Get some exercise every day. Build up slowly to 30 to 60 minutes a day on 5 or more days of the week. · Take a fiber supplement, such as Citrucel or Metamucil, every day if needed. Read and follow all instructions on the label.   · Schedule time each day for a bowel movement. Having a daily routine may help. Take your time and do not strain when having a bowel movement. · Ask your doctor about a laxative. The goal is to have one easy bowel movement every 1 to 2 days. Do not let constipation go untreated for more than 3 days. When should you call for help? Call your doctor now or seek immediate medical care if:    · You feel sad, anxious, or hopeless for more than a few days. This could mean you are depressed. Depression is common in people who have a lot of pain. But it can be treated.     · You have trouble with bowel movements, such as:  ¨ No bowel movement in 3 days. ¨ Blood in the anal area, in your stool, or on the toilet paper. ¨ Diarrhea for more than 24 hours.    Watch closely for changes in your health, and be sure to contact your doctor if:    · Your pain is getting worse.     · You can't sleep because of pain.     · You are very worried or anxious about your pain.     · You have trouble taking your pain medicine.     · You have any concerns about your pain medicine or its side effects.     · You have vomiting or cramps for more than 2 hours. Where can you learn more? Go to http://sharath-hank.info/. Enter R387 in the search box to learn more about \"Neuropathic Pain: Care Instructions. \"  Current as of: June 4, 2018  Content Version: 11.8  © 4806-1776 [employer]. Care instructions adapted under license by [employer] (which disclaims liability or warranty for this information). If you have questions about a medical condition or this instruction, always ask your healthcare professional. [place] [address] any warranty or liability for your use of this information.

## 2018-10-09 PROBLEM — R20.2 LEFT LEG PARESTHESIAS: Status: ACTIVE | Noted: 2018-10-09

## 2018-10-10 ENCOUNTER — DOCUMENTATION ONLY (OUTPATIENT)
Dept: ORTHOPEDIC SURGERY | Age: 53
End: 2018-10-10

## 2018-11-26 ENCOUNTER — DOCUMENTATION ONLY (OUTPATIENT)
Dept: ORTHOPEDIC SURGERY | Age: 53
End: 2018-11-26

## 2018-11-26 NOTE — PROGRESS NOTES
Left message for W/C adjustor Mireille Schofield to return my call RE: physical therapy order that is PENDING from October

## 2019-07-08 ENCOUNTER — OFFICE VISIT (OUTPATIENT)
Dept: ORTHOPEDIC SURGERY | Facility: CLINIC | Age: 54
End: 2019-07-08

## 2019-07-08 VITALS
HEART RATE: 92 BPM | DIASTOLIC BLOOD PRESSURE: 88 MMHG | RESPIRATION RATE: 18 BRPM | TEMPERATURE: 98.6 F | SYSTOLIC BLOOD PRESSURE: 128 MMHG | HEIGHT: 74 IN | WEIGHT: 282.8 LBS | OXYGEN SATURATION: 98 % | BODY MASS INDEX: 36.29 KG/M2

## 2019-07-08 DIAGNOSIS — R20.8 DYSESTHESIA: ICD-10-CM

## 2019-07-08 DIAGNOSIS — M54.12 CERVICAL RADICULAR PAIN: ICD-10-CM

## 2019-07-08 DIAGNOSIS — G89.29 CHRONIC LEFT SHOULDER PAIN: Primary | ICD-10-CM

## 2019-07-08 DIAGNOSIS — Z98.890 S/P LEFT ROTATOR CUFF REPAIR: ICD-10-CM

## 2019-07-08 DIAGNOSIS — M25.512 CHRONIC LEFT SHOULDER PAIN: Primary | ICD-10-CM

## 2019-07-08 RX ORDER — BETAMETHASONE SODIUM PHOSPHATE AND BETAMETHASONE ACETATE 3; 3 MG/ML; MG/ML
6 INJECTION, SUSPENSION INTRA-ARTICULAR; INTRALESIONAL; INTRAMUSCULAR; SOFT TISSUE ONCE
Qty: 0.5 ML | Refills: 0
Start: 2019-07-08 | End: 2019-07-08

## 2019-07-08 NOTE — PROGRESS NOTES
Patient: Amaya Goodman                MRN: [id number]       SSN: [id number]  YOB: 1965        AGE: 48 y.o. SEX: male    PCP: No primary care provider on file. 07/08/19    CC:  Neck and left arm pain    HISTORY:  Amaya Goodman is a 48 y.o. male who is seen for neck pain which radiates into his left upper extremity. He feels burning pain after raising his arm for short periods of time. He notes neck pain radiating to his left forarm and hand. He sustained a severe work injury to his left shoulder on 7/23/17. He fell off of an 8 ft. ladder onto a concrete surface. He was seen at [facility] on 7/24/17. He is s/p left rotator cuff repair on 8/22/17- doing well and now working construction full duty. He was previously seen for radiating left foot and leg pain. He reports pain radiating down through his left leg his plantar lateral foot. He notes foot pain and swelling following the injury. He notes pain over the upper left lateral gastrocnemius area. He reportsed pain when wearing the short fracture walker on his left foot. He reports pain radiating into his left second and third toes. He notes pain with standing and walking. He was seen by Dr. Natty Barbosa for his left foot pain and Dr. Leda Marquez for low back pain. He reports increased left leg pain after climbing ladders at work. Pain Assessment  7/8/2019   Location of Pain Shoulder   Pain Location Comment -   Location Modifiers Left   Severity of Pain 3   Quality of Pain Throbbing;Aching   Quality of Pain Comment -   Duration of Pain Persistent   Frequency of Pain Constant   Aggravating Factors Bending;Stretching;Straightening   Aggravating Factors Comment -   Limiting Behavior Yes   Relieving Factors Nothing   Relieving Factors Comment -   Result of Injury No   Work-Related Injury -   How long out of work?  -   Type of Injury -     Occupation, etc:  Mr. Ludwig Diana works a general  for [employer]. He lives in [place] with his wife and mother in law. He has two grown step children living with them as well--25and 23years old. Current weight is 281 pounds. He is 6'2\" tall. He is not hypertensive or diabetic. No results found for: HBA1C, HGBE8, SWF0BJUR, LEE2NDTZ, DPQ4SYVP  Weight Metrics 7/8/2019 10/8/2018 6/11/2018 6/4/2018 5/21/2018 4/20/2018 3/19/2018   Weight 282 lb 12.8 oz 280 lb 9.6 oz 281 lb 3.2 oz 281 lb 281 lb 12.8 oz 281 lb 9.6 oz 282 lb 6.4 oz   BMI 36.31 kg/m2 36.03 kg/m2 36.1 kg/m2 36.08 kg/m2 36.18 kg/m2 36.16 kg/m2 36.26 kg/m2       Patient Active Problem List   Diagnosis Code    S/P rotator cuff repair Z98.890    Cervical spinal stenosis M48.02    Severe obesity (BMI 35.0-39. 9) E66.01    Left leg paresthesias, EMG normal 5/2018 R20.2     REVIEW OF SYSTEMS: All Below are Negative except: See HPI   Constitutional: negative for fever, chills, and weight loss. Cardiovascular: negative for chest pain, claudication, leg swelling, SOB, LAM   Gastrointestinal: Negative for pain, N/V/C/D, Blood in stool or urine, dysuria,  hematuria, incontinence, pelvic pain. Musculoskeletal: See HPI   Neurological: Negative for dizziness and weakness. Negative for headaches, Visual changes, confusion, seizures   Phychiatric/Behavioral: Negative for depression, memory loss, substance  abuse. Extremities: Negative for hair changes, rash, or skin lesion changes. Hematologic: Negative for bleeding problems, bruising, pallor or swollen lymph  nodes   Peripheral Vascular: No calf pain, no circulation deficits.     Social History     Socioeconomic History    Marital status: SINGLE     Spouse name: Not on file    Number of children: Not on file    Years of education: Not on file    Highest education level: Not on file   Occupational History    Not on file   Social Needs    Financial resource strain: Not on file    Food insecurity:     Worry: Not on file     Inability: Not on file   [employer] needs:     Medical: Not on file     Non-medical: Not on file   Tobacco Use    Smoking status: Never Smoker    Smokeless tobacco: Never Used    Tobacco comment: pt counsled to continue to not smoke. Substance and Sexual Activity    Alcohol use: Yes     Comment: occasionally    Drug use: No    Sexual activity: Not on file   Lifestyle    Physical activity:     Days per week: Not on file     Minutes per session: Not on file    Stress: Not on file   Relationships    Social connections:     Talks on phone: Not on file     Gets together: Not on file     Attends Orthodox service: Not on file     Active member of club or organization: Not on file     Attends meetings of clubs or organizations: Not on file     Relationship status: Not on file    Intimate partner violence:     Fear of current or ex partner: Not on file     Emotionally abused: Not on file     Physically abused: Not on file     Forced sexual activity: Not on file   Other Topics Concern    Not on file   Social History Narrative    Not on file      No Known Allergies   Current Outpatient Medications   Medication Sig    gabapentin (NEURONTIN) 300 mg capsule Take 1 tab PO QHS PRN for pain    diclofenac EC (VOLTAREN) 50 mg EC tablet Take 1 Tab by mouth two (2) times daily (with meals). No current facility-administered medications for this visit.        PHYSICAL EXAMINATION:  Visit Vitals  /88   Pulse 92   Temp 98.6 °F (37 °C) (Oral)   Resp 18   Ht 6' 2\" (1.88 m)   Wt 282 lb 12.8 oz (128.3 kg)   SpO2 98%   BMI 36.31 kg/m²      PHYSICAL EXAM:  Visit Vitals  /88   Pulse 92   Temp 98.6 °F (37 °C) (Oral)   Resp 18   Ht 6' 2\" (1.88 m)   Wt 282 lb 12.8 oz (128.3 kg)   SpO2 98%   BMI 36.31 kg/m²     ORTHO EXAMINATION:  Examination Right shoulder Left shoulder   Skin Intact Intact, well healed incision site   Effusion - -   Biceps deformity - -   Atrophy - -   AC joint tenderness - +   Acromial tenderness + +, lateral Biceps tenderness - -   Forward flexion/Elevation  175   Active abduction  175   External rotation ROM 30 25   Internal rotation ROM 70 100   Apprehension - -   Impingement - -   Drop Arm Test - -   Neurovascular Intact Intact   No defect over the pectoralis major tendon  Pain with any motion left shoulder  Examination Right knee Left knee   Skin Intact Intact   Range of motion 125-0 120-0   Effusion - -   Medial joint line tenderness - -   Lateral joint line tenderness - +   Popliteal tenderness - -   Osteophytes palpable - -   Alicias - -   Patella crepitus + +   Anterior drawer - -   Lateral laxity - -   Medial laxity - -   Varus deformity - -   Valgus deformity - -   Pretibial edema - 2-3+   Calf tenderness - -   Chuck sign negative  Calf is soft  L LE    Examination Right Ankle/Foot Left Ankle/Foot   Skin Intact  intact   Swelling - -   Dorsiflexion 10 5   Plantarflexion 25 15   Deformity - -   Inversion laxity - -   Anterior drawer - -   Medial tenderness - +   Lateral tenderness - +   Heel cord Intact Intact   Sensation Intact Intact   Bunion - -   Toe nails Normal Normal   Capillary refill Normal Normal   Tenderness over calcaneofibular ligament of left ankle   Wearing a strap up ankle brace on his left ankle     Examination Neck   Skin Intact   Tenderness + left paracervical trapezius   Tightness +   Flexion Decreased 25%   Extension Decreased 25%   Lateral bend left Normal   Lateral bend right Normal   Masses -   Biceps reflex Normal   Triceps reflex Normal   Brachioradialis reflex Normal      TIME OUT:  Chart reviewed for the following:   IDedrick MD, have reviewed the History, Physical and updated the Allergic reactions for [address] [place] [address] performed immediately prior to start of procedure:  Josi Benavidez MD, have performed the following reviews on [facility] prior to the start of the procedure:          * Patient was identified by name and date of birth   * Agreement on procedure being performed was verified  * Risks and Benefits explained to the patient  * Procedure site verified and marked as necessary  * Patient was positioned for comfort  * Consent was obtained     Time: 4:54 PM     Date of procedure: 7/8/2019  Procedure performed by:  Jai Pinedo MD  Mr. Cate Wheeler tolerated the procedure well with no complications. MRI LEFT SHOULDER W CONT 8/3/17  IMPRESSION:   1. Complete tear of the supraspinatus tendon with retraction to [facility] [place]. Edema within the muscle suggest acuity  2. Near complete tear of the infraspinatus tendon with retraction. 3. No significant loss of rotator cuff muscle bulk  4. Mild subscapularis tendinosis with no tear. 5. Labral degeneration with no detached tear. RADIOGRAPHS:  XR LEFT SHOULDER 7/8/19 DANNY  IMPRESSION:  Three views - No fractures, mild acromioclavicular narrowing, no glenohumeral narrowing, no calcific densities. XR LEFT FOOT 9/11/17  IMPRESSION:  Three views - No fractures, no bunion deformity, no heel spur. XR TIB/FIB LEFT 9/11/17  IMPRESSION:  Two views - No fracture, no.      XR LEFT SHOULDER 7/24/17  IMPRESSION:  Three views - No fractures, mild acromioclavicular narrowing, no glenohumeral narrowing, no calcific densities. No dislocation. XR LEFT FOOT 7/24/17  IMPRESSION:  Three views - No fractures, no bunion deformity, no heel spur. Soft tissue swelling. IMPRESSION:      ICD-10-CM ICD-9-CM    1. Chronic left shoulder pain M25.512 719.41 AMB POC XRAY, SHOULDER; COMPLETE, 2+    G89.29 338.29    2. S/P left rotator cuff repair Z98.890 V45.89    3. Dysesthesia R20.8 782.0    4. Cervical radicular pain M54.12 723.4 betamethasone (CELESTONE SOLUSPAN) 6 mg/mL injection      BETAMETHASONE ACETATE & SODIUM PHOSPHATE INJECTION 3 MG EA.       INJECT TRIGGER POINT, 1 OR 2      REFERRAL TO SPINE SURGERY     PLAN:  After discussing treatment options, patient's left lower paracerivcal?trapezius region was injected with 4 cc Marcaine and 1/2 cc Celestone. There is no need for further surgery at this time. Continue shoulder exercises and full work activities. He will follow up at the spine center.      Scribed by Ele Chambers ([address]) as dictated by Roxann Martin MD

## 2020-06-11 NOTE — OP NOTES
Operative Note      Patient: Maximilian Bliss     Date of Surgery: 8/22/2017         YOB: 1965      Age:  50 y.o.        LOS:  LOS: 0 days       Preoperative Diagnosis:  M75.122 LEFT SHOULDER ROTATOR CUFF TEAR; M25.512 LEFT SHOULDER PAIN; S43.52XD ACROMIOCLAVICULAR SPRAIN LEFT SHOULDER    Postoperative Diagnosis: M75.122 LEFT SHOULDER ROTATOR CUFF TEAR; M25.512 LEFT SHOULDER PAIN; S43.52XD ACROMIOCLAVICULAR SPRAIN LEFT SHOULDER      Surgeon:  Octavia Ugarte MD    Anesthesia:  General anesthesia    Procedure:  Procedure(s):  OPEN LEFT SHOULDER DISTAL CLAVICLE RESECTION/ ACROMIOPLASTY WITH ROTATOR CUFF REPAIR/BIOMET JUGGER KNOTS/2 SA'S/NERVE BLOCK    Time out performed: YES    Estimated Blood Loss:  30 ML           Implants:    Implant Name Type Inv. Item Serial No.  Lot No. LRB No. Used Action   ANCHOR SUT Jai Miguelina 2.9MM --  - [id number]   ANCHOR SUT JUGGERKNOT 2.9MM --    [employer] [id number] Left 1 Implanted        Specimens: * No specimens in log *            Complications:  None    DESCRIPTION OF PROCEDURE: After satisfactory general plus interscalene nerve block anesthesia, with the patient in the supine beach chair position, the patient's shoulder and upper extremity were prepped with ChloraPrep solution and draped in the usual fashion for rotator cuff surgery. An anterolateral incision was made from the lateral acromion to the coracoid process. The incision was carried down through the subcutaneous tissues to the underlying deltoid. The deltoid was detached from the distal clavicle and anterior acromion. The distal clavicle was excised using an oscillating saw. An anterior inferior 1/3 acromionectomy was performed using the oscillating saw, and a smooth undersurface of the acromion was achieved. A supraspinatus tendon tear was noted. There was significant retraction of the tendon edge.   A full thickness chondral lesion ws noted on the lateral most portion of the humeral head. Firm adhesions were freed with digital palpation to mobilize the supraspinatus and infraspinatus tendons. The free edge of the rotator cuff was carefully trimmed with a #15 blade. A small bone trough was made just medial to the greater tuberosity. The free edge of rotator cuff tendon was repaired back into the bony trough using #2 FiberWire sutures through bone tunnels made with the Ampex system. The sutures were tied over the greater tuberosity. A 2.9 Juggerknot suture anchor was used for double row fixation. The sutures were tied, and a strong repair was obtained. The wound was irrigated thoroughly. Closure was obtained using #2 Vicryl for repair of the deltoid to trapezius and deltoid to acromion. The subcutaneous tissue was closed with 2-0 Vicryl and staples were used for the skin. Sterile gauze dressings and a sling were applied. Mr. Linh Keating was transported back to the recovery room in good condition. Sponge and needle count was correct. N/A

## (undated) DEVICE — BLADE SAW OSC 18.5X9.0X.038MM -- STRYKER 2296-3

## (undated) DEVICE — FLEX ADVANTAGE 3000CC: Brand: FLEX ADVANTAGE

## (undated) DEVICE — SUTURE VCRL SZ 2 L27IN ABSRB VLT L65MM TP-1 1/2 CIR J649G

## (undated) DEVICE — (D)DRSG BORD MPLX SCRM 18X18CM -- DISC BY MFR USE ITEM 346511

## (undated) DEVICE — OCCLUSIVE GAUZE STRIP,3% BISMUTH TRIBROMOPHENATE IN PETROLATUM BLEND: Brand: XEROFORM

## (undated) DEVICE — ABDOMINAL PAD: Brand: DERMACEA

## (undated) DEVICE — INTENDED FOR TISSUE SEPARATION, AND OTHER PROCEDURES THAT REQUIRE A SHARP SURGICAL BLADE TO PUNCTURE OR CUT.: Brand: BARD-PARKER SAFETY BLADES SIZE 10, STERILE

## (undated) DEVICE — ROTATOR CUFF REPAIR INSTRUMENTS SUTURE RETRIEVER, SMALL: Brand: CONMED

## (undated) DEVICE — KIT CLN UP BON SECOURS MARYV

## (undated) DEVICE — REM POLYHESIVE ADULT PATIENT RETURN ELECTRODE: Brand: VALLEYLAB

## (undated) DEVICE — STAPLER SKIN H3.9MM WIRE DIA0.58MM CRWN 6.9MM 35 STPL FIX

## (undated) DEVICE — GAUZE SPONGES,16 PLY: Brand: CURITY

## (undated) DEVICE — NEEDLE NRV BLK 22GA L2IN 30DEG INSUL BVL EXTN SET STIMUPLEX

## (undated) DEVICE — PACK PROCEDURE SURG TOT HIP BSHR LF

## (undated) DEVICE — AIRLIFE™ NASAL OXYGEN CANNULA CURVED, NONFLARED TIP WITH 14 FOOT (4.3 M) CRUSH-RESISTANT TUBING, OVER-THE-EAR STYLE: Brand: AIRLIFE™

## (undated) DEVICE — SOLUTION IV 1000ML 0.9% SOD CHL

## (undated) DEVICE — KENDALL SCD EXPRESS SLEEVES, KNEE LENGTH, MEDIUM: Brand: KENDALL SCD

## (undated) DEVICE — IMMOB SHLDR W/WAIST STRP L --

## (undated) DEVICE — BLADE SAW W9XL31MM THK038MM CUT THK043MM REPL SAG OSC THN

## (undated) DEVICE — SUTURE FIBERWIRE SZ 2 W/ TAPERED NEEDLE BLUE L38IN NONABSORB BLU L26.5MM 1/2 CIRCLE AR7200

## (undated) DEVICE — NEEDLE SUT 1/2 CIR TAPR TIP MAYO NONSTERILE SZ 5

## (undated) DEVICE — SLIM BODY SKIN STAPLER: Brand: APPOSE ULC

## (undated) DEVICE — 3M™ MICROFOAM™ SURGICAL TAPE 4 ROLLS/CARTON 6 CARTONS/CASE 1528-3: Brand: 3M™ MICROFOAM™

## (undated) DEVICE — PREP SKN CHLRAPRP APPL 10.5ML --